# Patient Record
Sex: FEMALE | Race: BLACK OR AFRICAN AMERICAN | NOT HISPANIC OR LATINO | Employment: OTHER | URBAN - METROPOLITAN AREA
[De-identification: names, ages, dates, MRNs, and addresses within clinical notes are randomized per-mention and may not be internally consistent; named-entity substitution may affect disease eponyms.]

---

## 2018-01-08 ENCOUNTER — HOSPITAL ENCOUNTER (INPATIENT)
Facility: HOSPITAL | Age: 68
LOS: 3 days | Discharge: HOME | DRG: 304 | End: 2018-01-11
Attending: EMERGENCY MEDICINE | Admitting: INTERNAL MEDICINE
Payer: MEDICARE

## 2018-01-08 DIAGNOSIS — R74.8 ELEVATED LIPASE: ICD-10-CM

## 2018-01-08 DIAGNOSIS — R11.2 INTRACTABLE VOMITING WITH NAUSEA, UNSPECIFIED VOMITING TYPE: ICD-10-CM

## 2018-01-08 DIAGNOSIS — R79.89 ELEVATED TROPONIN: ICD-10-CM

## 2018-01-08 DIAGNOSIS — R07.9 CHEST PAIN: ICD-10-CM

## 2018-01-08 DIAGNOSIS — I21.4 NSTEMI (NON-ST ELEVATED MYOCARDIAL INFARCTION): Primary | ICD-10-CM

## 2018-01-08 LAB
ALBUMIN SERPL BCP-MCNC: 3.7 G/DL
ALP SERPL-CCNC: 179 U/L
ALT SERPL W/O P-5'-P-CCNC: 21 U/L
ANION GAP SERPL CALC-SCNC: 10 MMOL/L
AST SERPL-CCNC: 13 U/L
BACTERIA #/AREA URNS HPF: ABNORMAL /HPF
BASOPHILS # BLD AUTO: 0.02 K/UL
BASOPHILS NFR BLD: 0.1 %
BILIRUB SERPL-MCNC: 0.4 MG/DL
BILIRUB UR QL STRIP: NEGATIVE
BNP SERPL-MCNC: 224 PG/ML
BUN SERPL-MCNC: 9 MG/DL
CALCIUM SERPL-MCNC: 10 MG/DL
CHLORIDE SERPL-SCNC: 109 MMOL/L
CLARITY UR: CLEAR
CO2 SERPL-SCNC: 27 MMOL/L
COLOR UR: YELLOW
CREAT SERPL-MCNC: 1.1 MG/DL
DIFFERENTIAL METHOD: ABNORMAL
EOSINOPHIL # BLD AUTO: 0 K/UL
EOSINOPHIL NFR BLD: 0.3 %
ERYTHROCYTE [DISTWIDTH] IN BLOOD BY AUTOMATED COUNT: 15.2 %
EST. GFR  (AFRICAN AMERICAN): 60 ML/MIN/1.73 M^2
EST. GFR  (NON AFRICAN AMERICAN): 52 ML/MIN/1.73 M^2
GLUCOSE SERPL-MCNC: 146 MG/DL
GLUCOSE UR QL STRIP: NEGATIVE
HCT VFR BLD AUTO: 40.3 %
HGB BLD-MCNC: 12.9 G/DL
HGB UR QL STRIP: NEGATIVE
HYALINE CASTS #/AREA URNS LPF: 0 /LPF
KETONES UR QL STRIP: ABNORMAL
LEUKOCYTE ESTERASE UR QL STRIP: NEGATIVE
LIPASE SERPL-CCNC: 323 U/L
LYMPHOCYTES # BLD AUTO: 1.5 K/UL
LYMPHOCYTES NFR BLD: 10.9 %
MCH RBC QN AUTO: 26.4 PG
MCHC RBC AUTO-ENTMCNC: 32 G/DL
MCV RBC AUTO: 83 FL
MICROSCOPIC COMMENT: ABNORMAL
MONOCYTES # BLD AUTO: 0.8 K/UL
MONOCYTES NFR BLD: 5.6 %
NEUTROPHILS # BLD AUTO: 11.4 K/UL
NEUTROPHILS NFR BLD: 82.9 %
NITRITE UR QL STRIP: NEGATIVE
PH UR STRIP: 5 [PH] (ref 5–8)
PLATELET # BLD AUTO: 432 K/UL
PMV BLD AUTO: 10.4 FL
POTASSIUM SERPL-SCNC: 4.1 MMOL/L
PROT SERPL-MCNC: 8 G/DL
PROT UR QL STRIP: ABNORMAL
RBC # BLD AUTO: 4.88 M/UL
RBC #/AREA URNS HPF: 1 /HPF (ref 0–4)
SODIUM SERPL-SCNC: 146 MMOL/L
SP GR UR STRIP: 1.02 (ref 1–1.03)
SQUAMOUS #/AREA URNS HPF: 3 /HPF
TROPONIN I SERPL DL<=0.01 NG/ML-MCNC: 0.2 NG/ML
URN SPEC COLLECT METH UR: ABNORMAL
UROBILINOGEN UR STRIP-ACNC: NEGATIVE EU/DL
WBC # BLD AUTO: 13.71 K/UL
WBC #/AREA URNS HPF: 1 /HPF (ref 0–5)
YEAST URNS QL MICRO: ABNORMAL

## 2018-01-08 PROCEDURE — 99285 EMERGENCY DEPT VISIT HI MDM: CPT | Mod: 25

## 2018-01-08 PROCEDURE — 63600175 PHARM REV CODE 636 W HCPCS: Performed by: EMERGENCY MEDICINE

## 2018-01-08 PROCEDURE — 93005 ELECTROCARDIOGRAM TRACING: CPT

## 2018-01-08 PROCEDURE — 82962 GLUCOSE BLOOD TEST: CPT

## 2018-01-08 PROCEDURE — 83690 ASSAY OF LIPASE: CPT

## 2018-01-08 PROCEDURE — 96375 TX/PRO/DX INJ NEW DRUG ADDON: CPT

## 2018-01-08 PROCEDURE — 25500020 PHARM REV CODE 255: Performed by: EMERGENCY MEDICINE

## 2018-01-08 PROCEDURE — 25000003 PHARM REV CODE 250: Performed by: EMERGENCY MEDICINE

## 2018-01-08 PROCEDURE — 84484 ASSAY OF TROPONIN QUANT: CPT

## 2018-01-08 PROCEDURE — 96361 HYDRATE IV INFUSION ADD-ON: CPT

## 2018-01-08 PROCEDURE — 85025 COMPLETE CBC W/AUTO DIFF WBC: CPT

## 2018-01-08 PROCEDURE — 12000002 HC ACUTE/MED SURGE SEMI-PRIVATE ROOM

## 2018-01-08 PROCEDURE — 80053 COMPREHEN METABOLIC PANEL: CPT

## 2018-01-08 PROCEDURE — 93010 ELECTROCARDIOGRAM REPORT: CPT | Mod: ,,, | Performed by: INTERNAL MEDICINE

## 2018-01-08 PROCEDURE — 96365 THER/PROPH/DIAG IV INF INIT: CPT

## 2018-01-08 PROCEDURE — 83880 ASSAY OF NATRIURETIC PEPTIDE: CPT

## 2018-01-08 PROCEDURE — 81000 URINALYSIS NONAUTO W/SCOPE: CPT

## 2018-01-08 RX ORDER — METOPROLOL TARTRATE 25 MG/1
25 TABLET, FILM COATED ORAL DAILY
Status: DISCONTINUED | OUTPATIENT
Start: 2018-01-08 | End: 2018-01-10

## 2018-01-08 RX ORDER — CLOPIDOGREL 300 MG/1
300 TABLET, FILM COATED ORAL
Status: COMPLETED | OUTPATIENT
Start: 2018-01-08 | End: 2018-01-08

## 2018-01-08 RX ORDER — ASPIRIN 325 MG
325 TABLET ORAL
Status: COMPLETED | OUTPATIENT
Start: 2018-01-08 | End: 2018-01-08

## 2018-01-08 RX ORDER — ONDANSETRON 2 MG/ML
8 INJECTION INTRAMUSCULAR; INTRAVENOUS
Status: COMPLETED | OUTPATIENT
Start: 2018-01-08 | End: 2018-01-08

## 2018-01-08 RX ORDER — LISINOPRIL 5 MG/1
10 TABLET ORAL DAILY
Status: DISCONTINUED | OUTPATIENT
Start: 2018-01-08 | End: 2018-01-09

## 2018-01-08 RX ADMIN — ONDANSETRON 8 MG: 2 INJECTION INTRAMUSCULAR; INTRAVENOUS at 06:01

## 2018-01-08 RX ADMIN — SODIUM CHLORIDE 1000 ML: 0.9 INJECTION, SOLUTION INTRAVENOUS at 06:01

## 2018-01-08 RX ADMIN — IOHEXOL 15 ML: 300 INJECTION, SOLUTION INTRAVENOUS at 07:01

## 2018-01-08 RX ADMIN — LISINOPRIL 10 MG: 5 TABLET ORAL at 11:01

## 2018-01-08 RX ADMIN — PROMETHAZINE HYDROCHLORIDE 25 MG: 25 INJECTION INTRAMUSCULAR; INTRAVENOUS at 08:01

## 2018-01-08 RX ADMIN — METOPROLOL TARTRATE 25 MG: 25 TABLET ORAL at 11:01

## 2018-01-08 RX ADMIN — CLOPIDOGREL BISULFATE 300 MG: 300 TABLET, FILM COATED ORAL at 11:01

## 2018-01-08 RX ADMIN — ASPIRIN 325 MG ORAL TABLET 325 MG: 325 PILL ORAL at 08:01

## 2018-01-08 RX ADMIN — IOHEXOL 80 ML: 350 INJECTION, SOLUTION INTRAVENOUS at 07:01

## 2018-01-09 PROBLEM — I16.1 HYPERTENSIVE EMERGENCY: Status: ACTIVE | Noted: 2018-01-09

## 2018-01-09 PROBLEM — E86.0 DEHYDRATION WITH HYPERNATREMIA: Status: ACTIVE | Noted: 2018-01-09

## 2018-01-09 PROBLEM — F17.200 SMOKING: Status: ACTIVE | Noted: 2018-01-09

## 2018-01-09 PROBLEM — E87.0 DEHYDRATION WITH HYPERNATREMIA: Status: ACTIVE | Noted: 2018-01-09

## 2018-01-09 PROBLEM — E11.9 DM TYPE 2 (DIABETES MELLITUS, TYPE 2): Status: ACTIVE | Noted: 2018-01-09

## 2018-01-09 PROBLEM — K85.90 ACUTE PANCREATITIS: Status: ACTIVE | Noted: 2018-01-09

## 2018-01-09 LAB
ALBUMIN SERPL BCP-MCNC: 3.6 G/DL
ALP SERPL-CCNC: 163 U/L
ALT SERPL W/O P-5'-P-CCNC: 21 U/L
ANION GAP SERPL CALC-SCNC: 11 MMOL/L
AST SERPL-CCNC: 17 U/L
BASOPHILS # BLD AUTO: 0.02 K/UL
BASOPHILS NFR BLD: 0.1 %
BILIRUB SERPL-MCNC: 0.5 MG/DL
BUN SERPL-MCNC: 10 MG/DL
CALCIUM SERPL-MCNC: 9.6 MG/DL
CHLORIDE SERPL-SCNC: 108 MMOL/L
CO2 SERPL-SCNC: 28 MMOL/L
CREAT SERPL-MCNC: 1.1 MG/DL
DIASTOLIC DYSFUNCTION: NO
DIASTOLIC DYSFUNCTION: YES
DIFFERENTIAL METHOD: ABNORMAL
EOSINOPHIL # BLD AUTO: 0 K/UL
EOSINOPHIL NFR BLD: 0.1 %
ERYTHROCYTE [DISTWIDTH] IN BLOOD BY AUTOMATED COUNT: 15.3 %
EST. GFR  (AFRICAN AMERICAN): 60 ML/MIN/1.73 M^2
EST. GFR  (NON AFRICAN AMERICAN): 52 ML/MIN/1.73 M^2
ESTIMATED AVG GLUCOSE: 154 MG/DL
ESTIMATED PA SYSTOLIC PRESSURE: 35.88
GLOBAL PERICARDIAL EFFUSION: ABNORMAL
GLUCOSE SERPL-MCNC: 165 MG/DL
HBA1C MFR BLD HPLC: 7 %
HCT VFR BLD AUTO: 38.4 %
HGB BLD-MCNC: 12.6 G/DL
LIPASE SERPL-CCNC: 7 U/L
LYMPHOCYTES # BLD AUTO: 1.5 K/UL
LYMPHOCYTES NFR BLD: 10.2 %
MCH RBC QN AUTO: 26.5 PG
MCHC RBC AUTO-ENTMCNC: 32.8 G/DL
MCV RBC AUTO: 81 FL
MITRAL VALVE MOBILITY: NORMAL
MONOCYTES # BLD AUTO: 0.9 K/UL
MONOCYTES NFR BLD: 6.1 %
NEUTROPHILS # BLD AUTO: 12.5 K/UL
NEUTROPHILS NFR BLD: 83.2 %
PLATELET # BLD AUTO: 428 K/UL
PMV BLD AUTO: 10.2 FL
POCT GLUCOSE: 158 MG/DL (ref 70–110)
POCT GLUCOSE: 174 MG/DL (ref 70–110)
POTASSIUM SERPL-SCNC: 3.4 MMOL/L
PROT SERPL-MCNC: 7.5 G/DL
RBC # BLD AUTO: 4.75 M/UL
RETIRED EF AND QEF - SEE NOTES: 35 (ref 55–65)
SODIUM SERPL-SCNC: 147 MMOL/L
TRICUSPID VALVE REGURGITATION: ABNORMAL
TROPONIN I SERPL DL<=0.01 NG/ML-MCNC: 0.17 NG/ML
TROPONIN I SERPL DL<=0.01 NG/ML-MCNC: 0.19 NG/ML
WBC # BLD AUTO: 15 K/UL

## 2018-01-09 PROCEDURE — 78452 HT MUSCLE IMAGE SPECT MULT: CPT | Mod: 26,,, | Performed by: INTERNAL MEDICINE

## 2018-01-09 PROCEDURE — 93306 TTE W/DOPPLER COMPLETE: CPT

## 2018-01-09 PROCEDURE — 94761 N-INVAS EAR/PLS OXIMETRY MLT: CPT

## 2018-01-09 PROCEDURE — 93018 CV STRESS TEST I&R ONLY: CPT | Mod: ,,, | Performed by: INTERNAL MEDICINE

## 2018-01-09 PROCEDURE — 85025 COMPLETE CBC W/AUTO DIFF WBC: CPT

## 2018-01-09 PROCEDURE — C9113 INJ PANTOPRAZOLE SODIUM, VIA: HCPCS | Performed by: EMERGENCY MEDICINE

## 2018-01-09 PROCEDURE — 93306 TTE W/DOPPLER COMPLETE: CPT | Mod: 26,,, | Performed by: INTERNAL MEDICINE

## 2018-01-09 PROCEDURE — 84484 ASSAY OF TROPONIN QUANT: CPT

## 2018-01-09 PROCEDURE — 25000003 PHARM REV CODE 250: Performed by: INTERNAL MEDICINE

## 2018-01-09 PROCEDURE — 63600175 PHARM REV CODE 636 W HCPCS: Performed by: EMERGENCY MEDICINE

## 2018-01-09 PROCEDURE — 99223 1ST HOSP IP/OBS HIGH 75: CPT | Mod: 25,,, | Performed by: INTERNAL MEDICINE

## 2018-01-09 PROCEDURE — 63600175 PHARM REV CODE 636 W HCPCS: Performed by: HOSPITALIST

## 2018-01-09 PROCEDURE — 21400001 HC TELEMETRY ROOM

## 2018-01-09 PROCEDURE — 93016 CV STRESS TEST SUPVJ ONLY: CPT | Mod: ,,, | Performed by: INTERNAL MEDICINE

## 2018-01-09 PROCEDURE — 25000242 PHARM REV CODE 250 ALT 637 W/ HCPCS: Performed by: HOSPITALIST

## 2018-01-09 PROCEDURE — 25000003 PHARM REV CODE 250: Performed by: EMERGENCY MEDICINE

## 2018-01-09 PROCEDURE — 36415 COLL VENOUS BLD VENIPUNCTURE: CPT

## 2018-01-09 PROCEDURE — 83036 HEMOGLOBIN GLYCOSYLATED A1C: CPT

## 2018-01-09 PROCEDURE — 94640 AIRWAY INHALATION TREATMENT: CPT

## 2018-01-09 PROCEDURE — 63600175 PHARM REV CODE 636 W HCPCS

## 2018-01-09 PROCEDURE — 63600175 PHARM REV CODE 636 W HCPCS: Performed by: INTERNAL MEDICINE

## 2018-01-09 PROCEDURE — 83690 ASSAY OF LIPASE: CPT

## 2018-01-09 PROCEDURE — 25000003 PHARM REV CODE 250: Performed by: HOSPITALIST

## 2018-01-09 PROCEDURE — 80053 COMPREHEN METABOLIC PANEL: CPT

## 2018-01-09 PROCEDURE — 93017 CV STRESS TEST TRACING ONLY: CPT

## 2018-01-09 RX ORDER — METOPROLOL TARTRATE 1 MG/ML
5 INJECTION, SOLUTION INTRAVENOUS EVERY 5 MIN PRN
Status: DISCONTINUED | OUTPATIENT
Start: 2018-01-09 | End: 2018-01-11 | Stop reason: HOSPADM

## 2018-01-09 RX ORDER — SODIUM CHLORIDE, SODIUM LACTATE, POTASSIUM CHLORIDE, CALCIUM CHLORIDE 600; 310; 30; 20 MG/100ML; MG/100ML; MG/100ML; MG/100ML
INJECTION, SOLUTION INTRAVENOUS CONTINUOUS
Status: ACTIVE | OUTPATIENT
Start: 2018-01-09 | End: 2018-01-10

## 2018-01-09 RX ORDER — ONDANSETRON 2 MG/ML
4 INJECTION INTRAMUSCULAR; INTRAVENOUS ONCE
Status: COMPLETED | OUTPATIENT
Start: 2018-01-09 | End: 2018-01-09

## 2018-01-09 RX ORDER — LISINOPRIL 5 MG/1
10 TABLET ORAL DAILY
Status: DISCONTINUED | OUTPATIENT
Start: 2018-01-09 | End: 2018-01-09

## 2018-01-09 RX ORDER — ASPIRIN 325 MG
325 TABLET ORAL DAILY
Status: DISCONTINUED | OUTPATIENT
Start: 2018-01-09 | End: 2018-01-09

## 2018-01-09 RX ORDER — SODIUM CHLORIDE 9 MG/ML
INJECTION, SOLUTION INTRAVENOUS CONTINUOUS
Status: DISCONTINUED | OUTPATIENT
Start: 2018-01-09 | End: 2018-01-09

## 2018-01-09 RX ORDER — ACETAMINOPHEN 325 MG/1
650 TABLET ORAL EVERY 6 HOURS PRN
Status: DISCONTINUED | OUTPATIENT
Start: 2018-01-09 | End: 2018-01-11 | Stop reason: HOSPADM

## 2018-01-09 RX ORDER — ONDANSETRON 2 MG/ML
8 INJECTION INTRAMUSCULAR; INTRAVENOUS EVERY 8 HOURS PRN
Status: DISCONTINUED | OUTPATIENT
Start: 2018-01-09 | End: 2018-01-11 | Stop reason: HOSPADM

## 2018-01-09 RX ORDER — PROMETHAZINE HYDROCHLORIDE 25 MG/1
25 SUPPOSITORY RECTAL EVERY 6 HOURS PRN
Status: DISCONTINUED | OUTPATIENT
Start: 2018-01-09 | End: 2018-01-09

## 2018-01-09 RX ORDER — PROCHLORPERAZINE EDISYLATE 5 MG/ML
2.5 INJECTION INTRAMUSCULAR; INTRAVENOUS EVERY 8 HOURS PRN
Status: DISCONTINUED | OUTPATIENT
Start: 2018-01-09 | End: 2018-01-11 | Stop reason: HOSPADM

## 2018-01-09 RX ORDER — INSULIN ASPART 100 [IU]/ML
1-10 INJECTION, SOLUTION INTRAVENOUS; SUBCUTANEOUS EVERY 6 HOURS PRN
Status: DISCONTINUED | OUTPATIENT
Start: 2018-01-09 | End: 2018-01-11 | Stop reason: HOSPADM

## 2018-01-09 RX ORDER — PANTOPRAZOLE SODIUM 40 MG/10ML
40 INJECTION, POWDER, LYOPHILIZED, FOR SOLUTION INTRAVENOUS DAILY
Status: DISCONTINUED | OUTPATIENT
Start: 2018-01-09 | End: 2018-01-11 | Stop reason: HOSPADM

## 2018-01-09 RX ORDER — ONDANSETRON 2 MG/ML
INJECTION INTRAMUSCULAR; INTRAVENOUS
Status: DISPENSED
Start: 2018-01-09 | End: 2018-01-10

## 2018-01-09 RX ORDER — LISINOPRIL 20 MG/1
40 TABLET ORAL DAILY
Status: DISCONTINUED | OUTPATIENT
Start: 2018-01-10 | End: 2018-01-11 | Stop reason: HOSPADM

## 2018-01-09 RX ORDER — HYDRALAZINE HYDROCHLORIDE 20 MG/ML
10 INJECTION INTRAMUSCULAR; INTRAVENOUS EVERY 6 HOURS PRN
Status: DISCONTINUED | OUTPATIENT
Start: 2018-01-09 | End: 2018-01-11 | Stop reason: HOSPADM

## 2018-01-09 RX ORDER — GLUCAGON 1 MG
1 KIT INJECTION
Status: DISCONTINUED | OUTPATIENT
Start: 2018-01-09 | End: 2018-01-11 | Stop reason: HOSPADM

## 2018-01-09 RX ORDER — CLOPIDOGREL BISULFATE 75 MG/1
75 TABLET ORAL DAILY
Status: DISCONTINUED | OUTPATIENT
Start: 2018-01-09 | End: 2018-01-10

## 2018-01-09 RX ORDER — ASPIRIN 81 MG/1
81 TABLET ORAL DAILY
Status: DISCONTINUED | OUTPATIENT
Start: 2018-01-10 | End: 2018-01-11

## 2018-01-09 RX ORDER — FLUTICASONE PROPIONATE AND SALMETEROL 250; 50 UG/1; UG/1
1 POWDER RESPIRATORY (INHALATION) 2 TIMES DAILY
Status: DISCONTINUED | OUTPATIENT
Start: 2018-01-09 | End: 2018-01-09 | Stop reason: CLARIF

## 2018-01-09 RX ORDER — REGADENOSON 0.08 MG/ML
INJECTION, SOLUTION INTRAVENOUS
Status: DISPENSED
Start: 2018-01-09 | End: 2018-01-10

## 2018-01-09 RX ORDER — PROCHLORPERAZINE EDISYLATE 5 MG/ML
10 INJECTION INTRAMUSCULAR; INTRAVENOUS EVERY 6 HOURS PRN
Status: DISCONTINUED | OUTPATIENT
Start: 2018-01-09 | End: 2018-01-09

## 2018-01-09 RX ORDER — FLUTICASONE FUROATE AND VILANTEROL 100; 25 UG/1; UG/1
1 POWDER RESPIRATORY (INHALATION) DAILY
Status: DISCONTINUED | OUTPATIENT
Start: 2018-01-09 | End: 2018-01-11 | Stop reason: HOSPADM

## 2018-01-09 RX ORDER — ONDANSETRON 2 MG/ML
4 INJECTION INTRAMUSCULAR; INTRAVENOUS EVERY 12 HOURS PRN
Status: DISCONTINUED | OUTPATIENT
Start: 2018-01-09 | End: 2018-01-09

## 2018-01-09 RX ORDER — LISINOPRIL 20 MG/1
20 TABLET ORAL ONCE
Status: COMPLETED | OUTPATIENT
Start: 2018-01-09 | End: 2018-01-09

## 2018-01-09 RX ORDER — SERTRALINE HYDROCHLORIDE 50 MG/1
100 TABLET, FILM COATED ORAL NIGHTLY
Status: DISCONTINUED | OUTPATIENT
Start: 2018-01-09 | End: 2018-01-11 | Stop reason: HOSPADM

## 2018-01-09 RX ADMIN — SODIUM CHLORIDE, SODIUM LACTATE, POTASSIUM CHLORIDE, AND CALCIUM CHLORIDE: 600; 310; 30; 20 INJECTION, SOLUTION INTRAVENOUS at 06:01

## 2018-01-09 RX ADMIN — SODIUM CHLORIDE: 0.9 INJECTION, SOLUTION INTRAVENOUS at 02:01

## 2018-01-09 RX ADMIN — ONDANSETRON 4 MG: 2 INJECTION, SOLUTION INTRAMUSCULAR; INTRAVENOUS at 04:01

## 2018-01-09 RX ADMIN — ONDANSETRON 4 MG: 2 INJECTION, SOLUTION INTRAMUSCULAR; INTRAVENOUS at 12:01

## 2018-01-09 RX ADMIN — LISINOPRIL 10 MG: 5 TABLET ORAL at 09:01

## 2018-01-09 RX ADMIN — HYDRALAZINE HYDROCHLORIDE 10 MG: 20 INJECTION INTRAMUSCULAR; INTRAVENOUS at 05:01

## 2018-01-09 RX ADMIN — PROCHLORPERAZINE EDISYLATE 10 MG: 5 INJECTION INTRAMUSCULAR; INTRAVENOUS at 06:01

## 2018-01-09 RX ADMIN — ASPIRIN 325 MG ORAL TABLET 325 MG: 325 PILL ORAL at 09:01

## 2018-01-09 RX ADMIN — HYDRALAZINE HYDROCHLORIDE 10 MG: 20 INJECTION INTRAMUSCULAR; INTRAVENOUS at 04:01

## 2018-01-09 RX ADMIN — METOPROLOL TARTRATE 25 MG: 25 TABLET ORAL at 09:01

## 2018-01-09 RX ADMIN — PANTOPRAZOLE SODIUM 40 MG: 40 INJECTION, POWDER, FOR SOLUTION INTRAVENOUS at 09:01

## 2018-01-09 RX ADMIN — FLUTICASONE FUROATE AND VILANTEROL TRIFENATATE 1 PUFF: 100; 25 POWDER RESPIRATORY (INHALATION) at 08:01

## 2018-01-09 RX ADMIN — CLOPIDOGREL BISULFATE 75 MG: 75 TABLET ORAL at 09:01

## 2018-01-09 RX ADMIN — SERTRALINE HYDROCHLORIDE 100 MG: 50 TABLET ORAL at 08:01

## 2018-01-09 RX ADMIN — PROCHLORPERAZINE EDISYLATE 2.5 MG: 5 INJECTION INTRAMUSCULAR; INTRAVENOUS at 06:01

## 2018-01-09 RX ADMIN — ACETAMINOPHEN 650 MG: 325 TABLET ORAL at 02:01

## 2018-01-09 RX ADMIN — LISINOPRIL 20 MG: 20 TABLET ORAL at 08:01

## 2018-01-09 NOTE — PLAN OF CARE
Problem: Diabetes, Type 2 (Adult)  Intervention: Support/Optimize Psychosocial Response to Condition   01/09/18 1404   Coping/Psychosocial Interventions   Supportive Measures active listening utilized;positive reinforcement provided   Environmental Support environmental consistency promoted;calm environment promoted     Intervention: Optimize Glycemic Control   01/09/18 1404   Nutrition Interventions   Glycemic Management blood glucose monitoring       Goal: Signs and Symptoms of Listed Potential Problems Will be Absent, Minimized or Managed (Diabetes, Type 2)  Signs and symptoms of listed potential problems will be absent, minimized or managed by discharge/transition of care (reference Diabetes, Type 2 (Adult) CPG).    01/09/18 1404   Diabetes, Type 2   Problems Assessed (Type 2 Diabetes) hyperglycemia;hypoglycemia;situational response   Problems Present (Type 2 Diabetes) hyperglycemia;hypoglycemia;situational response

## 2018-01-09 NOTE — SUBJECTIVE & OBJECTIVE
"Past Medical History:   Diagnosis Date    Anxiety     Arthritis     Asthma     Bronchiectasis with acute exacerbation     Bronchitis     Chronic pain     right knee    Essential hypertension, benign 11/21/2012    Hypotension, iatrogenic     Insomnia     Knee pain, right     chronic    Mitral valve prolapse     Obstructive chronic bronchitis without exacerbation 11/21/2012    Type II or unspecified type diabetes mellitus without mention of complication, uncontrolled     Type II or unspecified type diabetes mellitus without mention of complication, uncontrolled        Past Surgical History:   Procedure Laterality Date    bowel reconstruction      BREAST SURGERY      CHOLECYSTECTOMY      HYSTERECTOMY      KNEE SURGERY      R, surgeries x 7    Right Leg reconstruction surgery      Rotator Cuff Surgery      TONSILLECTOMY      WRIST SURGERY         Review of patient's allergies indicates:  No Known Allergies    No current facility-administered medications on file prior to encounter.      Current Outpatient Prescriptions on File Prior to Encounter   Medication Sig    ADVAIR DISKUS 250-50 mcg/dose diskus inhaler     bumetanide (BUMEX) 2 MG tablet     diclofenac sodium (VOLTAREN) 1 % Gel Apply 2 g topically 4 (four) times daily.    gabapentin (NEURONTIN) 600 MG tablet     LEVEMIR FLEXPEN 100 unit/mL (3 mL) InPn     lisinopril 10 MG tablet     NOVOLOG FLEXPEN 100 unit/mL InPn     potassium chloride (MICRO-K) 10 MEQ CpSR Take 10 mEq by mouth once daily.      quetiapine (SEROQUEL) 100 MG Tab Take by mouth every evening.    sertraline (ZOLOFT) 100 MG tablet     SURE COMFORT PEN NEEDLE 31 x 3/16 " Ndle     trazodone (DESYREL) 100 MG tablet Take 100 mg by mouth every evening.    [DISCONTINUED] desmopressin 10 mcg/spray (0.1 mL) Spry     [DISCONTINUED] fluconazole (DIFLUCAN) 150 MG Tab      Family History     Problem Relation (Age of Onset)    Heart disease Sister, Brother    Hypertension Mother "        Social History Main Topics    Smoking status: Current Every Day Smoker     Packs/day: 0.25     Years: 45.00     Types: Cigarettes    Smokeless tobacco: Never Used    Alcohol use No    Drug use: Yes     Types: Marijuana    Sexual activity: Not Currently     Partners: Male     Birth control/ protection: None     Review of Systems   Constitution: Negative for chills, diaphoresis, fever, weakness and malaise/fatigue.   HENT: Negative for nosebleeds.    Eyes: Negative for blurred vision and double vision.   Cardiovascular: Negative for chest pain, claudication, cyanosis, dyspnea on exertion, leg swelling, orthopnea, palpitations, paroxysmal nocturnal dyspnea and syncope.   Respiratory: Negative for cough, shortness of breath and wheezing.    Skin: Negative for dry skin and poor wound healing.   Musculoskeletal: Negative for back pain, joint swelling and myalgias.   Gastrointestinal: Positive for abdominal pain, nausea and vomiting.   Genitourinary: Negative for hematuria.   Neurological: Negative for dizziness, headaches, numbness and seizures.   Psychiatric/Behavioral: Negative for altered mental status and depression.     Objective:     Vital Signs (Most Recent):  Temp: 98.6 °F (37 °C) (01/09/18 0814)  Pulse: 102 (01/09/18 0849)  Resp: 18 (01/09/18 0849)  BP: (!) 172/79 (01/09/18 0814)  SpO2: (!) 92 % (01/09/18 0849) Vital Signs (24h Range):  Temp:  [98.2 °F (36.8 °C)-98.8 °F (37.1 °C)] 98.6 °F (37 °C)  Pulse:  [] 102  Resp:  [18-20] 18  SpO2:  [91 %-98 %] 92 %  BP: (158-210)/() 172/79     Weight: 98 kg (216 lb 0.8 oz)  Body mass index is 42.19 kg/m².    SpO2: (!) 92 %  O2 Device (Oxygen Therapy): room air      Intake/Output Summary (Last 24 hours) at 01/09/18 0902  Last data filed at 01/09/18 0646   Gross per 24 hour   Intake              340 ml   Output                0 ml   Net              340 ml       Lines/Drains/Airways     Peripheral Intravenous Line                 Peripheral IV -  Single Lumen 01/08/18 1852 Right Hand less than 1 day                Physical Exam   Constitutional: She is oriented to person, place, and time. She appears well-developed and well-nourished. No distress.   HENT:   Head: Normocephalic and atraumatic.   Mouth/Throat: No oropharyngeal exudate.   Eyes: Conjunctivae and EOM are normal. Pupils are equal, round, and reactive to light. No scleral icterus.   Neck: Normal range of motion. Neck supple. No JVD present. No tracheal deviation present.   Cardiovascular: Normal rate, regular rhythm, S1 normal and S2 normal.  Exam reveals no gallop and no friction rub.    No murmur heard.  Pulmonary/Chest: Effort normal and breath sounds normal. No respiratory distress. She has no wheezes.   Abdominal: Soft. Bowel sounds are normal. There is no tenderness.   obese   Musculoskeletal: Normal range of motion. She exhibits no edema.   Neurological: She is alert and oriented to person, place, and time. No cranial nerve deficit.   Skin: Skin is warm and dry. She is not diaphoretic.   Psychiatric: She has a normal mood and affect. Her behavior is normal. Judgment normal.       Current Medications:   aspirin  325 mg Oral Daily    clopidogrel  75 mg Oral Daily    fluticasone-vilanterol  1 puff Inhalation Daily    lisinopril  10 mg Oral Daily    metoprolol tartrate  25 mg Oral Daily    pantoprazole  40 mg Intravenous Daily    sertraline  100 mg Oral QHS      lactated ringers 150 mL/hr at 01/09/18 0646     acetaminophen, acetaminophen, dextrose 50%, glucagon (human recombinant), hydrALAZINE, influenza, insulin aspart, metoprolol, ondansetron, pneumoc 13-amos conj-dip cr(PF), prochlorperazine, promethazine    Laboratory:  CBC:    Recent Labs  Lab 10/25/16  0314 01/08/18  1628 01/09/18  0659   WHITE BLOOD CELL COUNT 14.56 H 13.71 H 15.00 H   HEMOGLOBIN 12.2 12.9 12.6   HEMATOCRIT 37.8 40.3 38.4   PLATELETS 431 H 432 H 428 H       CHEMISTRIES:    Recent Labs  Lab 10/25/16  0313  01/08/18  1628 01/09/18  0659   GLUCOSE 164 H 146 H 165 H   SODIUM 140 146 H 147 H   POTASSIUM 5.1 4.1 3.4 L   BUN BLD 26 H 9 10   CREATININE 1.2 1.1 1.1   EGFR IF  54 A 60 60   EGFR IF NON- 47 A 52 A 52 A   CALCIUM 9.8 10.0 9.6       CARDIAC BIOMARKERS:    Recent Labs  Lab 10/25/16  0314 01/08/18  1854 01/09/18  0037   TROPONIN I <0.006 0.198 H 0.191 H       COAGS:    Recent Labs  Lab 10/25/16  0314   INR 1.0       LIPIDS/LFTS:    Recent Labs  Lab 10/25/16  0314 01/08/18  1628 01/09/18  0659   AST 40 13 17   ALT 59 H 21 21     Lab Results   Component Value Date    LIPASE 7 01/09/2018     BNP    Recent Labs  Lab 01/08/18  1854   *           Diagnostic Results:  ECG (personally reviewed tracings):   1/8/18 2036 SR 96, PVC, PRWP, inflat st abnl ?isch    CT abd 1/8/18  1.  Intrahepatic and extrahepatic biliary ductal dilatation of unclear etiology.  Gallbladder is not seen and likely has been removed.  Further evaluation may be warranted with MRCP or ERCP.  2.  No CT evidence to suggest acute pancreatitis as clinically questioned.  3.  Colonic diverticulosis with no evidence of acute diverticulitis.  4.  Additional findings as detailed above.    Echo: pending

## 2018-01-09 NOTE — ASSESSMENT & PLAN NOTE
· Body mass index is 42.19 kg/m².  · Order a cardiac diet  · Counseling given  · Nutrition consult as an outpatient

## 2018-01-09 NOTE — ASSESSMENT & PLAN NOTE
· Chronic tobacco use  · Tobacco cessation counseling  · Nicotine patch contraindicated with possible ACS; consider Wellbutrin or Chantix through PCP as an outpatient (will require closer monitoring)

## 2018-01-09 NOTE — ASSESSMENT & PLAN NOTE
· Presents with a systolic blood pressure of approximately 220 mmHg and elevated troponin.    · In order to decrease the risk of acute stroke, we will initiate blood pressure medications with a goal of a decrease of 30% in the next 24 hours.  · Thereafter, the goal while inpatient is a systolic blood pressure less than 160mmHg  · BP not in acceptable range at this time  · Initiate oral regimen as well as providing PRN IV medications  · May need to escalate to Cardene drip for tighter blood pressure control  · Continue current regimen

## 2018-01-09 NOTE — PROGRESS NOTES
Note that patient's BP is elevated (182/85); Patient denies Junior, Cp; Denies nausea at this time; Given PRN hydralazine; Will continue to f/u;

## 2018-01-09 NOTE — PLAN OF CARE
"TN completed discharge needs assessment. TN provided and reviewed with patient "Blue My Health Packet" , "Help At Home" and "Discharge Planning Begins on Admission" handouts. TN discussed with patient the things the patient is responsible for to manage patient's  healthcare at home. Patient verbalized understanding & teachback implemented. Patient prefers morning doctor appointments.     01/09/18 1534   Discharge Assessment   Assessment Type Discharge Planning Assessment   Assessment information obtained from? Patient   Prior to hospitilization cognitive status: Alert/Oriented;No Deficits   Prior to hospitalization functional status: Independent;Assistive Equipment   Current cognitive status: Alert/Oriented;No Deficits   Current Functional Status: Independent;Assistive Equipment   Lives With alone   Able to Return to Prior Arrangements no   Who are your caregiver(s) and their phone number(s)? (Sita Keyes (Daughter)      )   Patient's perception of discharge disposition admitted as an inpatient   Readmission Within The Last 30 Days no previous admission in last 30 days   Patient currently being followed by outpatient case management? No   Patient currently receives any other outside agency services? No   Equipment Currently Used at Home cane, straight;rollator;glucometer   Do you have any problems affording any of your prescribed medications? No   Is the patient taking medications as prescribed? yes   Does the patient have transportation home? Yes   Transportation Available family or friend will provide   Discharge Plan A Home   Discharge Plan B Home   Patient/Family In Agreement With Plan yes   Does the patient have transportation to healthcare appointments? Yes     Electron Database [facility] - [place], [place] - [address] [facility]  [address] [facility] [address]  [facility] [address]  Phone: [phone] Fax: [phone]    [facility] 26 hrs /week 7 days a week         "

## 2018-01-09 NOTE — ASSESSMENT & PLAN NOTE
Elevated troponin level.  Likely cardiac strain secondary to uncontrolled hypertension as noted above.

## 2018-01-09 NOTE — CONSULTS
[facility]  Cardiology  Consult Note    Patient Name: Raissa Land  MRN: [id number]  Admission Date: 1/8/2018  Hospital Length of Stay: 1 days  Code Status: Full Code   Attending Provider: Sydni Deras MD   Consulting Provider: Daniel Maher MD  Primary Care Physician: Guerrero Hensley Jr, MD  Principal Problem:Hypertensive emergency    Patient information was obtained from patient and ER records.     Inpatient consult to Cardiology  Consult performed by: DANIEL MAHER  Consult ordered by: DANIEL DE PAZ  Reason for consult: elev trop        Subjective:     Chief Complaint:  nausea     HPI:   67 y.o. female that (in part)  has a past medical history of Anxiety; Arthritis; Asthma; Bronchiectasis with acute exacerbation; Bronchitis; Chronic pain; Essential hypertension, benign; Hypotension, iatrogenic; Insomnia; Knee pain, right; Mitral valve prolapse; Obstructive chronic bronchitis without exacerbation; Type II or unspecified type diabetes mellitus without mention of complication, uncontrolled; and Type II or unspecified type diabetes mellitus without mention of complication, uncontrolled. Presents to [facility] Emergency Department  presents with several hour duration of epigastric pain. Patient reports no history of previous pancreatitis that has required hospitalization.  Patient denies having any alcoholic drinks. Patient is experiencing persistent, deep, aching abdominal pain and points to the epigastric area when asked where the most severe pain is located. Patient admits to having associated symptoms including nausea with vomiting, but denies fever and chills, and has not been able to eat or drink since onset of abdominal pain.  Pain is exacerbated by palpation of the epigastrium as well as oral intake.  Relieved with pain medication given in the ER. Complains that the pain is severe, progressive, and worsening.    She has had previous pancreatic  surgery.  Reports associated symptoms of constipation no bowel movements in 3 days.  Also reportedly febrile 100.6°F.  Initially she was hypertensive with a blood pressure of 220 and was minimally responsive to initial dose of labetalol.  She was also found to have leukocytosis as well as anemia with an anion gap of 16.  Her influenza screen was negative.  CT abdomen pelvis was also obtained but did not show any abnormalities.  She has had low urine output and was given IV fluids in the ED and we are pending urinalysis.  General surgery has been consulted.    In the emergency department routine labs including lipase were obtained along with abdominal imaging.  Findings were consistent with acute pancreatitis.  She also had evidence of new T-wave inversions in anterolateral leads as well as elevated troponin level.  There is concern she was having hypertensive emergency due to a systolic blood pressure of ~220mmHg.     Lipase has normalized and pt feeling better.  She denies any CP/SOB, but states she hasn't been able to eat for 3 days due to nausea and retching.   on arrival with trop 0.19 x2 (flat pattern).    Past Medical History:   Diagnosis Date    Anxiety     Arthritis     Asthma     Bronchiectasis with acute exacerbation     Bronchitis     Chronic pain     right knee    Essential hypertension, benign 11/21/2012    Hypotension, iatrogenic     Insomnia     Knee pain, right     chronic    Mitral valve prolapse     Obstructive chronic bronchitis without exacerbation 11/21/2012    Type II or unspecified type diabetes mellitus without mention of complication, uncontrolled     Type II or unspecified type diabetes mellitus without mention of complication, uncontrolled        Past Surgical History:   Procedure Laterality Date    bowel reconstruction      BREAST SURGERY      CHOLECYSTECTOMY      HYSTERECTOMY      KNEE SURGERY      R, surgeries x 7    Right Leg reconstruction surgery       "Rotator Cuff Surgery      TONSILLECTOMY      WRIST SURGERY         Review of patient's allergies indicates:  No Known Allergies    No current facility-administered medications on file prior to encounter.      Current Outpatient Prescriptions on File Prior to Encounter   Medication Sig    ADVAIR DISKUS 250-50 mcg/dose diskus inhaler     bumetanide (BUMEX) 2 MG tablet     diclofenac sodium (VOLTAREN) 1 % Gel Apply 2 g topically 4 (four) times daily.    gabapentin (NEURONTIN) 600 MG tablet     LEVEMIR FLEXPEN 100 unit/mL (3 mL) InPn     lisinopril 10 MG tablet     NOVOLOG FLEXPEN 100 unit/mL InPn     potassium chloride (MICRO-K) 10 MEQ CpSR Take 10 mEq by mouth once daily.      quetiapine (SEROQUEL) 100 MG Tab Take by mouth every evening.    sertraline (ZOLOFT) 100 MG tablet     SURE COMFORT PEN NEEDLE 31 x 3/16 " Ndle     trazodone (DESYREL) 100 MG tablet Take 100 mg by mouth every evening.    [DISCONTINUED] desmopressin 10 mcg/spray (0.1 mL) Spry     [DISCONTINUED] fluconazole (DIFLUCAN) 150 MG Tab      Family History     Problem Relation (Age of Onset)    Heart disease Sister, Brother    Hypertension Mother        Social History Main Topics    Smoking status: Current Every Day Smoker     Packs/day: 0.25     Years: 45.00     Types: Cigarettes    Smokeless tobacco: Never Used    Alcohol use No    Drug use: Yes     Types: Marijuana    Sexual activity: Not Currently     Partners: Male     Birth control/ protection: None     Review of Systems   Constitution: Negative for chills, diaphoresis, fever, weakness and malaise/fatigue.   HENT: Negative for nosebleeds.    Eyes: Negative for blurred vision and double vision.   Cardiovascular: Negative for chest pain, claudication, cyanosis, dyspnea on exertion, leg swelling, orthopnea, palpitations, paroxysmal nocturnal dyspnea and syncope.   Respiratory: Negative for cough, shortness of breath and wheezing.    Skin: Negative for dry skin and poor wound " healing.   Musculoskeletal: Negative for back pain, joint swelling and myalgias.   Gastrointestinal: Positive for abdominal pain, nausea and vomiting.   Genitourinary: Negative for hematuria.   Neurological: Negative for dizziness, headaches, numbness and seizures.   Psychiatric/Behavioral: Negative for altered mental status and depression.     Objective:     Vital Signs (Most Recent):  Temp: 98.6 °F (37 °C) (01/09/18 0814)  Pulse: 102 (01/09/18 0849)  Resp: 18 (01/09/18 0849)  BP: (!) 172/79 (01/09/18 0814)  SpO2: (!) 92 % (01/09/18 0849) Vital Signs (24h Range):  Temp:  [98.2 °F (36.8 °C)-98.8 °F (37.1 °C)] 98.6 °F (37 °C)  Pulse:  [] 102  Resp:  [18-20] 18  SpO2:  [91 %-98 %] 92 %  BP: (158-210)/() 172/79     Weight: 98 kg (216 lb 0.8 oz)  Body mass index is 42.19 kg/m².    SpO2: (!) 92 %  O2 Device (Oxygen Therapy): room air      Intake/Output Summary (Last 24 hours) at 01/09/18 0902  Last data filed at 01/09/18 0646   Gross per 24 hour   Intake              340 ml   Output                0 ml   Net              340 ml       Lines/Drains/Airways     Peripheral Intravenous Line                 Peripheral IV - Single Lumen 01/08/18 1852 Right Hand less than 1 day                Physical Exam   Constitutional: She is oriented to person, place, and time. She appears well-developed and well-nourished. No distress.   HENT:   Head: Normocephalic and atraumatic.   Mouth/Throat: No oropharyngeal exudate.   Eyes: Conjunctivae and EOM are normal. Pupils are equal, round, and reactive to light. No scleral icterus.   Neck: Normal range of motion. Neck supple. No JVD present. No tracheal deviation present.   Cardiovascular: Normal rate, regular rhythm, S1 normal and S2 normal.  Exam reveals no gallop and no friction rub.    No murmur heard.  Pulmonary/Chest: Effort normal and breath sounds normal. No respiratory distress. She has no wheezes.   Abdominal: Soft. Bowel sounds are normal. There is no tenderness.    obese   Musculoskeletal: Normal range of motion. She exhibits no edema.   Neurological: She is alert and oriented to person, place, and time. No cranial nerve deficit.   Skin: Skin is warm and dry. She is not diaphoretic.   Psychiatric: She has a normal mood and affect. Her behavior is normal. Judgment normal.       Current Medications:   aspirin  325 mg Oral Daily    clopidogrel  75 mg Oral Daily    fluticasone-vilanterol  1 puff Inhalation Daily    lisinopril  10 mg Oral Daily    metoprolol tartrate  25 mg Oral Daily    pantoprazole  40 mg Intravenous Daily    sertraline  100 mg Oral QHS      lactated ringers 150 mL/hr at 01/09/18 0646     acetaminophen, acetaminophen, dextrose 50%, glucagon (human recombinant), hydrALAZINE, influenza, insulin aspart, metoprolol, ondansetron, pneumoc 13-amos conj-dip cr(PF), prochlorperazine, promethazine    Laboratory:  CBC:    Recent Labs  Lab 10/25/16  0314 01/08/18  1628 01/09/18  0659   WHITE BLOOD CELL COUNT 14.56 H 13.71 H 15.00 H   HEMOGLOBIN 12.2 12.9 12.6   HEMATOCRIT 37.8 40.3 38.4   PLATELETS 431 H 432 H 428 H       CHEMISTRIES:    Recent Labs  Lab 10/25/16  0314 01/08/18  1628 01/09/18  0659   GLUCOSE 164 H 146 H 165 H   SODIUM 140 146 H 147 H   POTASSIUM 5.1 4.1 3.4 L   BUN BLD 26 H 9 10   CREATININE 1.2 1.1 1.1   EGFR IF  54 A 60 60   EGFR IF NON- 47 A 52 A 52 A   CALCIUM 9.8 10.0 9.6       CARDIAC BIOMARKERS:    Recent Labs  Lab 10/25/16  0314 01/08/18  1854 01/09/18  0037   TROPONIN I <0.006 0.198 H 0.191 H       COAGS:    Recent Labs  Lab 10/25/16  0314   INR 1.0       LIPIDS/LFTS:    Recent Labs  Lab 10/25/16  0314 01/08/18  1628 01/09/18  0659   AST 40 13 17   ALT 59 H 21 21     Lab Results   Component Value Date    LIPASE 7 01/09/2018     BNP    Recent Labs  Lab 01/08/18  1854   *           Diagnostic Results:  ECG (personally reviewed tracings):   1/8/18 2036 SR 96, PVC, PRWP, inflat st abnl ?isch    CT abd  1/8/18  1.  Intrahepatic and extrahepatic biliary ductal dilatation of unclear etiology.  Gallbladder is not seen and likely has been removed.  Further evaluation may be warranted with MRCP or ERCP.  2.  No CT evidence to suggest acute pancreatitis as clinically questioned.  3.  Colonic diverticulosis with no evidence of acute diverticulitis.  4.  Additional findings as detailed above.    Echo: pending      Assessment and Plan:     * Hypertensive emergency    ?r/t GI process  Note mad eof elev trop/flat pattern without angina/dyspnea, likely demand related  Cont med rx HTN  Check echo and MPI today  Further recs pending above testing        NSTEMI (non-ST elevated myocardial infarction)    As above, likely demand not ACS  Echo and MPI today        DM type 2 (diabetes mellitus, type 2)    Per IM        Acute pancreatitis    Lipase normalized  GI eval pending            VTE Risk Mitigation         Ordered     Medium Risk of VTE  Once      01/09/18 0132     Place sequential compression device  Until discontinued      01/09/18 0132     Place GENARO hose  Until discontinued      01/09/18 0132          Thank you for your consult. I will follow-up with patient. Please contact us if you have any additional questions.    Daniel Arevalo MD  Cardiology   [facility]    Addendum 5pm:    Edith MPI  Nuclear Quantitative Functional Analysis:   LVEF: 42 %  LVED Volume: 122 ml  LVES Volume: 71 ml  Impression: ABNORMAL MYOCARDIAL PERFUSION  1. There is a small size fixed defect of mild intensity in the apical wall of the left ventricle, consistent with myocardial injury. There is trivial mode-injury ischemia.   2. There is abnormal wall motion at rest showing mild global hypokinesis of the left ventricle.   3. There is resting LV dysfunction with a reduced ejection fraction of 42 %.   4. The ventricular volumes are normal at rest and stress.   5. The extracardiac distribution of radioactivity is normal.      Echo    1 - Moderately depressed left ventricular systolic function (EF 35-40%).  Moderate global hypokinesis..     2 - Impaired LV relaxation, normal LAP (grade 1 diastolic dysfunction).     3 - Eccentric hypertrophy.     4 - Mild left ventricular enlargement.     5 - Trivial tricuspid regurgitation.     6 - The estimated PA systolic pressure is 36 mmHg.     No further CV testing planned.  Pt is at acceptable risk for GI procedures as deemed necessary.

## 2018-01-09 NOTE — ASSESSMENT & PLAN NOTE
· Evidenced by hx, physical exam, imaging, and laboratory studies  · Differential of common etiology includes: alcohol, gallstones, metabolic disorders (hereditary pancreatitis, hypercalcemia, elevated triglycerides, malnutrition), abdominal trauma, penetrating ulcers, carcinoma of the head of pancreas, drugs (diuretics, gliptins, tetracycline, sulfonamides, estrogens, azathioprine and mercaptopurine, pentamidine, salicylates, steroids),  infections  (mumps, viral hepatitis, coxsackie B virus, cytomegalovirus, Mycoplasma pneumoniae, Ascaris), structural abnormalities (choledochocele, pancreas divisum), or autoimmune pancreatitis    · Lipase = 323    [place]'s Criteria @ TIME OF ADMISSION  · Age >55 = yes  · WBC >16K = no  · BG >200 = no  · AST > 250 = no  · LDH > 350 = not ordered at the time of admission     Conclusion:  Patient has less than three criteria; does not meet ICU admission criteria.    · NPO / Bowel rest  · IV fluids  · Monitor serial lipase levels  · +/- GI consult as clinical course dictates

## 2018-01-09 NOTE — HPI
67 y.o. female that (in part)  has a past medical history of Anxiety; Arthritis; Asthma; Bronchiectasis with acute exacerbation; Bronchitis; Chronic pain; Essential hypertension, benign; Hypotension, iatrogenic; Insomnia; Knee pain, right; Mitral valve prolapse; Obstructive chronic bronchitis without exacerbation; Type II or unspecified type diabetes mellitus without mention of complication, uncontrolled; and Type II or unspecified type diabetes mellitus without mention of complication, uncontrolled. Presents to [facility]  presents with several hour duration of epigastric pain. Patient reports no history of previous pancreatitis that has required hospitalization.  Patient denies having any alcoholic drinks. Patient is experiencing persistent, deep, aching abdominal pain and points to the epigastric area when asked where the most severe pain is located. Patient admits to having associated symptoms including nausea with vomiting, but denies fever and chills, and has not been able to eat or drink since onset of abdominal pain.  Pain is exacerbated by palpation of the epigastrium as well as oral intake.  Relieved with pain medication given in the ER. Complains that the pain is severe, progressive, and worsening.    She has had previous pancreatic surgery.  Reports associated symptoms of constipation no bowel movements in 3 days.  Also reportedly febrile 100.6°F.  Initially she was hypertensive with a blood pressure of 220 and was minimally responsive to initial dose of labetalol.  She was also found to have leukocytosis as well as anemia with an anion gap of 16.  Her influenza screen was negative.  CT abdomen pelvis was also obtained but did not show any abnormalities.  She has had low urine output and was given IV fluids in the ED and we are pending urinalysis.  General surgery has been consulted.    In the emergency department routine labs including lipase were obtained  along with abdominal imaging.  Findings were consistent with acute pancreatitis.  She also had evidence of new T-wave inversions in anterolateral leads as well as elevated troponin level.  There is concern she was having hypertensive emergency due to a systolic blood pressure of ~220mmHg.     Lipase has normalized and pt feeling better.  She denies any CP/SOB, but states she hasn't been able to eat for 3 days due to nausea and retching.   on arrival with trop 0.19 x2 (flat pattern).

## 2018-01-09 NOTE — PROGRESS NOTES
"Note that patient awake, alert and fully oriented;  Denies nausea at this time; Patient is up to the BR with assistance;   Patient is requesting a private room as ,"i couldn't sleep last night."   Charge RN informed of room request.;   Will continue to monitor;    "

## 2018-01-09 NOTE — CONSULTS
"Chief Complaint:  "I was throwing up."    HPI:  The patient is a 67 year old woman with a history of HTN, mitral valve prolapse, DM, arthritis, and asthma presenting with hypertensive emergency, a NSTEMI, abdominal pain, nausea, and emesis.  She was having epigastric pain, nausea, and emesis for the last 6 days.  Her pain resolved, but her nausea and emesis continued to where she was unable to keep down her lisinopril.  She presented with very elevated blood pressures and her pancreatic enzymes were transiently elevated.  She is currently eating, but still has some nausea. She has emesis when she drank oral contrast for a CT scan. The patient has not had weight loss, diarrhea, or constipation.  She does not take NSAIDs.  The patient believes she had polyps years ago.    Past Medical History:   Diagnosis Date    Anxiety     Arthritis     Asthma     Bronchiectasis with acute exacerbation     Bronchitis     Chronic pain     right knee    Essential hypertension, benign 11/21/2012    Hypotension, iatrogenic     Insomnia     Knee pain, right     chronic    Mitral valve prolapse     Obstructive chronic bronchitis without exacerbation 11/21/2012    Type II or unspecified type diabetes mellitus without mention of complication, uncontrolled     Type II or unspecified type diabetes mellitus without mention of complication, uncontrolled      Past Surgical History:   Procedure Laterality Date    bowel reconstruction      BREAST SURGERY      CHOLECYSTECTOMY      HYSTERECTOMY      KNEE SURGERY      R, surgeries x 7    Right Leg reconstruction surgery      Rotator Cuff Surgery      TONSILLECTOMY      WRIST SURGERY       Family History   Problem Relation Age of Onset    Heart disease Sister     Heart disease Brother     Hypertension Mother      Social History     Social History    Marital status:      Spouse name: N/A    Number of children: N/A    Years of education: N/A     Occupational History "    Not on file.     Social History Main Topics    Smoking status: Current Every Day Smoker     Packs/day: 0.25     Years: 45.00     Types: Cigarettes    Smokeless tobacco: Never Used    Alcohol use No    Drug use: Yes     Types: Marijuana    Sexual activity: Not Currently     Partners: Male     Birth control/ protection: None     Other Topics Concern    Not on file     Social History Narrative    . Adult children. On disability.       aspirin  325 mg Oral Daily    clopidogrel  75 mg Oral Daily    fluticasone-vilanterol  1 puff Inhalation Daily    lisinopril  10 mg Oral Daily    metoprolol tartrate  25 mg Oral Daily    ondansetron        pantoprazole  40 mg Intravenous Daily    regadenoson        sertraline  100 mg Oral QHS     Review of patient's allergies indicates:  No Known Allergies    ROS:  No chest pain or dyspnea.  No dysuria.  No heartburn or dysphagia.  Otherwise as stated above.  Ten other systems negative.    Vitals:    01/09/18 0618 01/09/18 0814 01/09/18 0849 01/09/18 1523   BP: (!) 168/94 (!) 172/79  (!) 182/85   BP Location: Left arm Left arm  Left arm   Patient Position: Sitting Lying  Sitting   Pulse: 100 99 102 89   Resp:  18 18 18   Temp:  98.6 °F (37 °C)  98 °F (36.7 °C)   TempSrc:  Axillary  Oral   SpO2:  98% (!) 92% 97%   Weight:       Height:         P.E.:  GEN: A x O x 3, NAD  SKIN: No jaundice  HEENT: EOMI, PERRL, anicteric sclera  CV: RRR, no M/R/G  Chest: CTA B  Abdomen: soft, NTND, normoactive BS  Ext: No C/C/E.  2+ dorsalis pedis pulses B  Neuro: No asterixes or tremors.  CN II-XII intact  Musculoskeletal: 5/5 strength bilaterally    Labs:  Recent Results (from the past 336 hour(s))   CBC auto differential    Collection Time: 01/09/18  6:59 AM   Result Value Ref Range    WBC 15.00 (H) 3.90 - 12.70 K/uL    Hemoglobin 12.6 12.0 - 16.0 g/dL    Hematocrit 38.4 37.0 - 48.5 %    Platelets 428 (H) 150 - 350 K/uL   CBC W/ AUTO DIFFERENTIAL    Collection Time: 01/08/18   4:28 PM   Result Value Ref Range    WBC 13.71 (H) 3.90 - 12.70 K/uL    Hemoglobin 12.9 12.0 - 16.0 g/dL    Hematocrit 40.3 37.0 - 48.5 %    Platelets 432 (H) 150 - 350 K/uL     CMP  Sodium   Date Value Ref Range Status   01/09/2018 147 (H) 136 - 145 mmol/L Final     Potassium   Date Value Ref Range Status   01/09/2018 3.4 (L) 3.5 - 5.1 mmol/L Final     Chloride   Date Value Ref Range Status   01/09/2018 108 95 - 110 mmol/L Final     CO2   Date Value Ref Range Status   01/09/2018 28 23 - 29 mmol/L Final     Glucose   Date Value Ref Range Status   01/09/2018 165 (H) 70 - 110 mg/dL Final     BUN, Bld   Date Value Ref Range Status   01/09/2018 10 8 - 23 mg/dL Final     Creatinine   Date Value Ref Range Status   01/09/2018 1.1 0.5 - 1.4 mg/dL Final     Calcium   Date Value Ref Range Status   01/09/2018 9.6 8.7 - 10.5 mg/dL Final     Total Protein   Date Value Ref Range Status   01/09/2018 7.5 6.0 - 8.4 g/dL Final     Albumin   Date Value Ref Range Status   01/09/2018 3.6 3.5 - 5.2 g/dL Final     Total Bilirubin   Date Value Ref Range Status   01/09/2018 0.5 0.1 - 1.0 mg/dL Final     Comment:     For infants and newborns, interpretation of results should be based  on gestational age, weight and in agreement with clinical  observations.  Premature Infant recommended reference ranges:  Up to 24 hours.............<8.0 mg/dL  Up to 48 hours............<12.0 mg/dL  3-5 days..................<15.0 mg/dL  6-29 days.................<15.0 mg/dL       Alkaline Phosphatase   Date Value Ref Range Status   01/09/2018 163 (H) 55 - 135 U/L Final     AST   Date Value Ref Range Status   01/09/2018 17 10 - 40 U/L Final     ALT   Date Value Ref Range Status   01/09/2018 21 10 - 44 U/L Final     Anion Gap   Date Value Ref Range Status   01/09/2018 11 8 - 16 mmol/L Final     eGFR if    Date Value Ref Range Status   01/09/2018 60 >60 mL/min/1.73 m^2 Final     eGFR if non    Date Value Ref Range Status    01/09/2018 52 (A) >60 mL/min/1.73 m^2 Final     Comment:     Calculation used to obtain the estimated glomerular filtration  rate (eGFR) is the CKD-EPI equation.          No results for input(s): PT, INR, APTT in the last 24 hours.    CT of Abdomen/Pelvis with Contrast:  Impression:       1.  Intrahepatic and extrahepatic biliary ductal dilatation of unclear etiology.  Gallbladder is not seen and likely has been removed.  Further evaluation may be warranted with MRCP or ERCP.    2.  No CT evidence to suggest acute pancreatitis as clinically questioned.    3.  Colonic diverticulosis with no evidence of acute diverticulitis.    4.  Additional findings as detailed above.     A/P:  The patient is a 67 year old woman with a history of HTN, mitral valve prolapse, DM, arthritis, and asthma presenting with hypertensive emergency, a NSTEMI, abdominal pain, nausea, and emesis.  1.  Abdominal Pain/Emesis - the patient states her abdominal pain and emesis may have resolved, but she still has some nausea.  The etiology of this is unclear.  She may have a gastroenteritis, but she denies having any sick contacts, traveling, or eating anything unusual.  Her hypertensive emergency may be secondary to not being able to take her anti-hypertensive medications.  Records suggest she had surgery on her pancreas, but she denies this.  Her pancreatic enzymes normalized quickly and a CT scan did not show pancreatic abnormalities, so it is unclear if she truly had pancreatitis.  Her gallbladder has been removed and this may be why she has dilated bile ducts.  The patient can undergo an outpatient EUS to better evaluate the pancreas and bile ducts.  An EGD was offered tomorrow for her nausea and emesis.  She would like to wait on the results of her stress test and how she does overnight.  Tomorrow, it can be determined if she can undergo an EGD.  The patient would eventually also benefit from an outpatient surveillance colonoscopy.    Thank  you for this consult.

## 2018-01-09 NOTE — H&P
[facility] Hospital Medicine  History & Physical    Patient Name: Raissa Land  MRN: [id number]  Admission Date: 01/09/2018  Attending Physician: Daniel Leonard MD, MPH      PCP:     Guerrero Hensley Jr, MD    CC:     Chief Complaint   Patient presents with    Abdominal Pain     Generalized intermittent abdominal pain that started Friday. +n/v        HISTORY OF PRESENT ILLNESS:     Raissa Land is a 67 y.o. female that (in part)  has a past medical history of Anxiety; Arthritis; Asthma; Bronchiectasis with acute exacerbation; Bronchitis; Chronic pain; Essential hypertension, benign; Hypotension, iatrogenic; Insomnia; Knee pain, right; Mitral valve prolapse; Obstructive chronic bronchitis without exacerbation; Type II or unspecified type diabetes mellitus without mention of complication, uncontrolled; and Type II or unspecified type diabetes mellitus without mention of complication, uncontrolled. Presents to [facility] Emergency Department  presents with several hour duration of epigastric pain. Patient reports no history of previous pancreatitis that has required hospitalization.  Patient denies having any alcoholic drinks. Patient is experiencing persistent, deep, aching abdominal pain and points to the epigastric area when asked where the most severe pain is located. Patient admits to having associated symptoms including nausea with vomiting, but denies fever and chills, and has not been able to eat or drink since onset of abdominal pain.  Pain is exacerbated by palpation of the epigastrium as well as oral intake.  Relieved with pain medication given in the ER. Complains that the pain is severe, progressive, and worsening.    She has had previous pancreatic surgery.  Reports associated symptoms of constipation no bowel movements in 3 days.  Also reportedly febrile 100.6°F.  Initially she was hypertensive with a blood pressure of 220 and was minimally responsive to initial  dose of labetalol.  She was also found to have leukocytosis as well as anemia with an anion gap of 16.  Her influenza screen was negative.  CT abdomen pelvis was also obtained but did not show any abnormalities.  She has had low urine output and was given IV fluids in the ED and we are pending urinalysis.  General surgery has been consulted.      In the emergency department routine labs including lipase were obtained along with abdominal imaging.  Findings were consistent with acute pancreatitis.  She also had evidence of new T-wave inversions in anterolateral leads as well as elevated troponin level.  There is concern she was having hypertensive emergency due to a systolic blood pressure of ~220mmHg.     The patient is being admitted to Hospital medicine service for IV fluids, bowel rest, and serial monitoring of pancreatic enzymes in addition to further evaluation and treatment as outlined below in detail.  GI consultation if symptoms do not improve within a reasonable amount of time.        REVIEW OF SYSTEMS:     -- Constitutional: +Fever.  No chills.  -- Eyes: No visual changes, diplopia, pain, tearing, blind spots, or discharge.   -- Ears, nose, mouth, throat, and face: No congestion, sore throat, epistaxis, d/c, bleeding gums, neck stiffness masses, or dental issues.  -- Respiratory: No cough, shortness of breath, hemoptysis, stridor, wheezing, or night sweats.  -- Cardiovascular: + Epigastric pain.  No chest pain, GRACE, syncope, PND, edema, cyanosis, or palpitations.   -- Gastrointestinal: As above in history of present illness.  -- Genitourinary: No hematuria, dysuria, frequency, urgency, nocturia, polyuria, stones, or incontinence.  -- Integument/breast: No rash, pruritis, pigmentation changes, dryness, or changes in hair  -- Hematologic/lymphatic: No easy bruising or lymphadenopathy.   -- Musculoskeletal: chronic arthralgias.   No acute arthralgias, acute myalgias, joint swelling, acute limitations of ROM,  or acute muscular weakness.  -- Neurological: No seizures, headaches, incoordination, paraesthesias, ataxia, vertigo, or tremors.  -- Behavioral/Psych: No auditory or visual hallucinations, depression, or suicidal/homicidal ideations.  -- Endocrine: No heat or cold intolerance, polydipsia, or unintentional weight gain / loss.  -- Allergy/Immunologic: No recurrent infections or adverse reaction to food, insects, or difficulty breathing.      PAST MEDICAL / SURGICAL HISTORY:     Past Medical History:   Diagnosis Date    Anxiety     Arthritis     Asthma     Bronchiectasis with acute exacerbation     Bronchitis     Chronic pain     right knee    Essential hypertension, benign 11/21/2012    Hypotension, iatrogenic     Insomnia     Knee pain, right     chronic    Mitral valve prolapse     Obstructive chronic bronchitis without exacerbation 11/21/2012    Type II or unspecified type diabetes mellitus without mention of complication, uncontrolled     Type II or unspecified type diabetes mellitus without mention of complication, uncontrolled      Past Surgical History:   Procedure Laterality Date    bowel reconstruction      BREAST SURGERY      CHOLECYSTECTOMY      HYSTERECTOMY      KNEE SURGERY      R, surgeries x 7    Right Leg reconstruction surgery      Rotator Cuff Surgery      TONSILLECTOMY      WRIST SURGERY           FAMILY HISTORY:     Family History   Problem Relation Age of Onset    Heart disease Sister     Heart disease Brother     Hypertension Mother          SOCIAL HISTORY:     Social History   Substance Use Topics    Smoking status: Current Every Day Smoker     Packs/day: 0.25     Years: 45.00     Types: Cigarettes    Smokeless tobacco: Never Used    Alcohol use No     Social History     Social History    Marital status:      Spouse name: N/A    Number of children: N/A    Years of education: N/A     Social History Main Topics    Smoking status: Current Every Day Smoker  "    Packs/day: 0.25     Years: 45.00     Types: Cigarettes    Smokeless tobacco: Never Used    Alcohol use No    Drug use: Yes     Types: Marijuana    Sexual activity: Not Currently     Partners: Male     Birth control/ protection: None     Other Topics Concern    None     Social History Narrative    . Adult children. On disability.          ALLERGIES:       Review of patient's allergies indicates:  No Known Allergies      HEALTH SCREENING:     Influenza vaccine not up-to-date for this season.  Prevnar 13 pneumonia vaccine = no evidence of previous vaccination found in the medical record      HOME MEDICATIONS:     Prior to Admission medications    Medication Sig Start Date End Date Taking? Authorizing Provider   ADVAIR DISKUS 250-50 mcg/dose diskus inhaler  5/2/13   Historical Provider, MD   bumetanide (BUMEX) 2 MG tablet  3/20/13   Historical Provider, MD   diclofenac sodium (VOLTAREN) 1 % Gel Apply 2 g topically 4 (four) times daily. 2/26/14   Veda Salazar MD   gabapentin (NEURONTIN) 600 MG tablet  4/15/13   Historical Provider, MD   LEVEMIR FLEXPEN 100 unit/mL (3 mL) InPn  4/13/13   Historical Provider, MD   lisinopril 10 MG tablet  5/2/13   Historical Provider, MD   NOVOLOG FLEXPEN 100 unit/mL InPn  5/26/13   Historical Provider, MD   potassium chloride (MICRO-K) 10 MEQ CpSR Take 10 mEq by mouth once daily.      Historical Provider, MD   quetiapine (SEROQUEL) 100 MG Tab Take by mouth every evening.    Historical Provider, MD   sertraline (ZOLOFT) 100 MG tablet  5/2/13   Historical Provider, MD   SURE COMFORT PEN NEEDLE 31 x 3/16 " Ndle  5/8/13   Historical Provider, MD   trazodone (DESYREL) 100 MG tablet Take 100 mg by mouth every evening.    Historical Provider, MD   desmopressin 10 mcg/spray (0.1 mL) Spry  4/15/13 1/9/18  Historical Provider, MD   fluconazole (DIFLUCAN) 150 MG Tab  2/25/13 1/9/18  Historical Provider, MD          HOSPITAL MEDICATIONS:     Scheduled Meds:    aspirin  325 mg " Oral Daily    clopidogrel  75 mg Oral Daily    fluticasone-salmeterol 250-50 mcg/dose  1 puff Inhalation BID    lisinopril  10 mg Oral Daily    metoprolol tartrate  25 mg Oral Daily    pantoprazole  40 mg Intravenous Daily    sertraline  100 mg Oral QHS     Continuous Infusions:    lactated ringers       PRN Meds: acetaminophen, acetaminophen, dextrose 50%, glucagon (human recombinant), hydrALAZINE, influenza, insulin aspart, metoprolol, ondansetron, pneumoc 13-amos conj-dip cr(PF), promethazine      PHYSICAL EXAM:     Wt Readings from Last 1 Encounters:   01/09/18 0150 98 kg (216 lb 0.8 oz)   01/09/18 0144 96.8 kg (213 lb 6.5 oz)   01/08/18 1300 94.8 kg (209 lb)     Body mass index is 42.19 kg/m².  Vitals:    01/09/18 0125 01/09/18 0144 01/09/18 0150 01/09/18 0359   BP:  (!) 181/91  (!) 197/88   BP Location:  Left arm  Left arm   Patient Position:  Lying  Lying   Pulse:  87  72   Resp: 18 18 18   Temp: 98.5 °F (36.9 °C) 98.7 °F (37.1 °C)  98.8 °F (37.1 °C)   TempSrc: Oral Oral  Oral   SpO2:  96%  97%   Weight:  96.8 kg (213 lb 6.5 oz) 98 kg (216 lb 0.8 oz)    Height:   5' (1.524 m)           -- General appearance: well developed. appears stated age   -- Head: normocephalic, atraumatic   -- Eyes: conjunctivae clear. Extraocular muscles intact  -- Nose: Nares normal. Septum midline.   -- Mouth/Throat: lips, mucosa, and tongue normal. no throat erythema.   -- Neck: supple, symmetrical, trachea midline, no JVD and thyroid not grossly enlarged, appears symmetric  -- Lungs: clear to auscultation bilaterally. normal respiratory effort. No use of accessory muscles.   -- Chest wall: no tenderness. equal bilateral chest rise   -- Heart: regular rate and regular rhythm. S1, S2 normal.  no click, rub or gallop   -- Abdomen: Epigastric pain with palpation.  Otherwise abdomen is soft, non-distended, non-tympanic; bowel sounds normal; no masses, but exam is limited by body habitus  -- Extremities: no cyanosis, clubbing or  edema.   -- Pulses: Bounding, 2+ and symmetric   -- Skin: color normal, texture normal, turgor normal. No rashes or lesions.   -- Neurologic: Normal strength and tone. No focal numbness or weakness. CNII-XII intact. Boogie coma scale: eyes open spontaneously-4, oriented & converses-5, obeys commands-6.      LABORATORY STUDIES:     Recent Results (from the past 36 hour(s))   CBC W/ AUTO DIFFERENTIAL    Collection Time: 01/08/18  4:28 PM   Result Value Ref Range    WBC 13.71 (H) 3.90 - 12.70 K/uL    RBC 4.88 4.00 - 5.40 M/uL    Hemoglobin 12.9 12.0 - 16.0 g/dL    Hematocrit 40.3 37.0 - 48.5 %    MCV 83 82 - 98 fL    MCH 26.4 (L) 27.0 - 31.0 pg    MCHC 32.0 32.0 - 36.0 g/dL    RDW 15.2 (H) 11.5 - 14.5 %    Platelets 432 (H) 150 - 350 K/uL    MPV 10.4 9.2 - 12.9 fL    Gran # 11.4 (H) 1.8 - 7.7 K/uL    Lymph # 1.5 1.0 - 4.8 K/uL    Mono # 0.8 0.3 - 1.0 K/uL    Eos # 0.0 0.0 - 0.5 K/uL    Baso # 0.02 0.00 - 0.20 K/uL    Gran% 82.9 (H) 38.0 - 73.0 %    Lymph% 10.9 (L) 18.0 - 48.0 %    Mono% 5.6 4.0 - 15.0 %    Eosinophil% 0.3 0.0 - 8.0 %    Basophil% 0.1 0.0 - 1.9 %    Differential Method Automated    Comp. Metabolic Panel    Collection Time: 01/08/18  4:28 PM   Result Value Ref Range    Sodium 146 (H) 136 - 145 mmol/L    Potassium 4.1 3.5 - 5.1 mmol/L    Chloride 109 95 - 110 mmol/L    CO2 27 23 - 29 mmol/L    Glucose 146 (H) 70 - 110 mg/dL    BUN, Bld 9 8 - 23 mg/dL    Creatinine 1.1 0.5 - 1.4 mg/dL    Calcium 10.0 8.7 - 10.5 mg/dL    Total Protein 8.0 6.0 - 8.4 g/dL    Albumin 3.7 3.5 - 5.2 g/dL    Total Bilirubin 0.4 0.1 - 1.0 mg/dL    Alkaline Phosphatase 179 (H) 55 - 135 U/L    AST 13 10 - 40 U/L    ALT 21 10 - 44 U/L    Anion Gap 10 8 - 16 mmol/L    eGFR if African American 60 >60 mL/min/1.73 m^2    eGFR if non African American 52 (A) >60 mL/min/1.73 m^2   Lipase    Collection Time: 01/08/18  4:28 PM   Result Value Ref Range    Lipase 323 (H) 4 - 60 U/L   Urinalysis - Clean Catch    Collection Time: 01/08/18  4:28  PM   Result Value Ref Range    Specimen UA Urine, Clean Catch     Color, UA Yellow Yellow, Straw, Chelsi    Appearance, UA Clear Clear    pH, UA 5.0 5.0 - 8.0    Specific Gravity, UA 1.020 1.005 - 1.030    Protein, UA 1+ (A) Negative    Glucose, UA Negative Negative    Ketones, UA 1+ (A) Negative    Bilirubin (UA) Negative Negative    Occult Blood UA Negative Negative    Nitrite, UA Negative Negative    Urobilinogen, UA Negative <2.0 EU/dL    Leukocytes, UA Negative Negative   Urinalysis Microscopic    Collection Time: 01/08/18  4:28 PM   Result Value Ref Range    RBC, UA 1 0 - 4 /hpf    WBC, UA 1 0 - 5 /hpf    Bacteria, UA Few (A) None-Occ /hpf    Yeast, UA Occasional (A) None    Squam Epithel, UA 3 /hpf    Hyaline Casts, UA 0 0-1/lpf /lpf    Microscopic Comment SEE COMMENT    Troponin I    Collection Time: 01/08/18  6:54 PM   Result Value Ref Range    Troponin I 0.198 (H) 0.000 - 0.026 ng/mL   Brain natriuretic peptide    Collection Time: 01/08/18  6:54 PM   Result Value Ref Range     (H) 0 - 99 pg/mL   Troponin I    Collection Time: 01/09/18 12:37 AM   Result Value Ref Range    Troponin I 0.191 (H) 0.000 - 0.026 ng/mL       Lab Results   Component Value Date    INR 1.0 10/25/2016     Lab Results   Component Value Date    HGBA1C 10.6 (H) 11/21/2012     No results for input(s): POCTGLUCOSE in the last 72 hours.          IMAGING:     Imaging Results          CT Abdomen Pelvis With Contrast (Final result)  Result time 01/08/18 20:14:54    Final result by Garry Ballesteros MD (01/08/18 20:14:54)                 Impression:      1.  Intrahepatic and extrahepatic biliary ductal dilatation of unclear etiology.  Gallbladder is not seen and likely has been removed.  Further evaluation may be warranted with MRCP or ERCP.    2.  No CT evidence to suggest acute pancreatitis as clinically questioned.    3.  Colonic diverticulosis with no evidence of acute diverticulitis.    4.  Additional findings as detailed  above.        Electronically signed by: CHASE CHAN MD  Date:     01/08/18  Time:    20:14              Narrative:    Clinical indication: 67-year-old female evaluate for pancreatitis.    Comparison: None.    Technique: Transaxial images were obtained through the abdomen and pelvis in 5 mm increments.  80 cc of Omnipaque 350 IV contrast were administered.      Findings:  Heart is mildly enlarged with coronary artery calcification seen.  The lung bases are clear.    The liver is normal in size, contour, and attenuation with no focal hepatic abnormality.  There is intrahepatic and extrahepatic biliary ductal dilatation of uncertain etiology.  Common duct measures upper is of 18 mm in diameter.  Gallbladder has been removed.  No evidence of pancreatic duct dilatation.  No surrounding peripancreatic inflammatory changes to suggest acute pancreatitis.  Stomach, spleen, and adrenal glands are unremarkable.    Kidneys are functioning.  No hydronephrosis.  Ureters and urinary bladder are unremarkable.  Uterus has been removed.      There is colonic diverticulosis involving the descending and sigmoid colon without evidence for acute diverticulitis.  The visualized loops of small and large bowel show no evidence of obstruction or inflammation.  No free air or free fluid.    Aorta tapers normally.     Degenerative changes are seen in the spine with severe facet arthropathy resulting in grade 1 anterolisthesis of L5 on S1.  Subcutaneous soft tissue structures are unremarkable.                                CONSULTS:     IP CONSULT TO CARDIOLOGY  IP CONSULT TO GI       ASSESSMENT & PLAN:     Primary Diagnosis:  Hypertensive emergency    Active Hospital Problems    Diagnosis  POA    *Hypertensive emergency [I16.1]  Yes     Priority: 1 - High    NSTEMI (non-ST elevated myocardial infarction) [I21.4]  Yes     Priority: 2     Dehydration with hypernatremia [E87.0]  Yes     Priority: 3     DM type 2 (diabetes mellitus, type 2)  [E11.9]  Yes    Acute pancreatitis [K85.90]  Unknown    Smoking [F17.200]  Yes    Chronic pain of multiple sites [R52, G89.29]  Yes    Obesity [E66.9]  Yes    Essential hypertension, benign [I10]  Yes     Chronic      Resolved Hospital Problems    Diagnosis Date Resolved POA   No resolved problems to display.         Hypertensive emergency  · Presents with a systolic blood pressure of approximately 220 mmHg and elevated troponin.    · In order to decrease the risk of acute stroke, we will initiate blood pressure medications with a goal of a decrease of 30% in the next 24 hours.  · Thereafter, the goal while inpatient is a systolic blood pressure less than 160mmHg  · BP not in acceptable range at this time  · Initiate oral regimen as well as providing PRN IV medications  · May need to escalate to Cardene drip for tighter blood pressure control  · Continue current regimen    NSTEMI (non-ST elevated myocardial infarction)    Elevated troponin level.  Likely cardiac strain secondary to uncontrolled hypertension as noted above.    Dehydration with hypernatremia    Dehydration secondary to nausea, vomiting, diarrhea.  Likely viral gastroenteritis.  Leukocytosis without granulocytosis.  Providing IV fluids.  Watching sodium levels closely.    Chronic pain of multiple sites    Supportive care.  No acute issues    DM type 2 (diabetes mellitus, type 2)    · BG in acceptable range at this time  · Maintain w/ subcutaneous insulin management order set  · Hold oral diabetic meds  · ADA 1800 kcal diet  · BG goal while in patient is <180mg/dL  · HgA1c = Pending    Essential hypertension, benign  · Goal while inpatient is a systolic blood pressure less than 160mmHg  · BP in acceptable range at this time  · Continue current home regimen with hold parameters  · PRN antihypertensives available        Obesity    · Body mass index is 42.19 kg/m².  · Order a cardiac diet  · Counseling given  · Nutrition consult as an  outpatient          Acute pancreatitis  · Evidenced by hx, physical exam, imaging, and laboratory studies  · Differential of common etiology includes: alcohol, gallstones, metabolic disorders (hereditary pancreatitis, hypercalcemia, elevated triglycerides, malnutrition), abdominal trauma, penetrating ulcers, carcinoma of the head of pancreas, drugs (diuretics, gliptins, tetracycline, sulfonamides, estrogens, azathioprine and mercaptopurine, pentamidine, salicylates, steroids),  infections  (mumps, viral hepatitis, coxsackie B virus, cytomegalovirus, Mycoplasma pneumoniae, Ascaris), structural abnormalities (choledochocele, pancreas divisum), or autoimmune pancreatitis    · Lipase = 323    Memo's Criteria @ TIME OF ADMISSION  · Age >55 = yes  · WBC >16K = no  · BG >200 = no  · AST > 250 = no  · LDH > 350 = not ordered at the time of admission     Conclusion:  Patient has less than three criteria; does not meet ICU admission criteria.    · NPO / Bowel rest  · IV fluids  · Monitor serial lipase levels  · +/- GI consult as clinical course dictates        Smoking  · Chronic tobacco use  · Tobacco cessation counseling  · Nicotine patch contraindicated with possible ACS; consider Wellbutrin or Chantix through PCP as an outpatient (will require closer monitoring)          VTE Risk Mitigation         Ordered     Medium Risk of VTE  Once      01/09/18 0132     Place sequential compression device  Until discontinued      01/09/18 0132     Place GENARO hose  Until discontinued      01/09/18 0132            Adult PRN medications available   DVT prophylaxis given       DISPOSITION:     Will admit to the Hospital Medicine service for further evaluation and treatment.    Chart reviewed and updated where applicable.    High Risk Conditions:  Patient has a condition that poses threat to life and bodily function: Acute pancreatitis; NSTEMI      ===============================================================    Daniel Leonard MD,  MPH  Department of Hospital Medicine   [facility]  [phone] pg  (7pm - 6am)          This note is dictated using Dragon Medical 360 voice recognition software.  There are word recognition mistakes that are occasionally missed on review.

## 2018-01-09 NOTE — ED NOTES
Pt. Taken to the Main side ED, placed in gown, cardiac monitor, auto b/p cuff. 12- Lead EKG done.daughter at bedside.

## 2018-01-09 NOTE — PLAN OF CARE
Problem: Nausea/Vomiting (Adult)  Intervention: Minimize Nausea Triggers/Manage Symptoms   01/09/18 0742   Monitor/Manage Hypovolemia   Nausea/Vomiting Interventions cool cloth applied;medication given;slow deep breathing encouraged   Coping/Psychosocial Interventions   Environmental Support calm environment promoted;distractions minimized;environmental consistency promoted;rest periods encouraged     Intervention: Position to Prevent Aspiration   01/09/18 0742   Positioning   Head of Bed (HOB) HOB at 30-45 degrees   Body Position positioned/repositioned independently     Intervention: Promote/Maintain Hydration   01/09/18 0742   Nutrition Interventions   Fluid/Electrolyte Management intravenous fluid replacement initiated       Goal: Symptom Relief  Patient will demonstrate the desired outcomes by discharge/transition of care.  Outcome: Ongoing (interventions implemented as appropriate)   01/09/18 0742   Nausea/Vomiting (Adult)   Symptom Relief making progress toward outcome     Goal: Adequate Hydration  Patient will demonstrate the desired outcomes by discharge/transition of care.  Outcome: Ongoing (interventions implemented as appropriate)   01/09/18 0742   Nausea/Vomiting (Adult)   Adequate Hydration making progress toward outcome

## 2018-01-09 NOTE — ASSESSMENT & PLAN NOTE
Dehydration secondary to nausea, vomiting, diarrhea.  Likely viral gastroenteritis.  Leukocytosis without granulocytosis.  Providing IV fluids.  Watching sodium levels closely.

## 2018-01-09 NOTE — HPI
Raissa Land is a 67 y.o. female that (in part)  has a past medical history of Anxiety; Arthritis; Asthma; Bronchiectasis with acute exacerbation; Bronchitis; Chronic pain; Essential hypertension, benign; Hypotension, iatrogenic; Insomnia; Knee pain, right; Mitral valve prolapse; Obstructive chronic bronchitis without exacerbation; Type II or unspecified type diabetes mellitus without mention of complication, uncontrolled; and Type II or unspecified type diabetes mellitus without mention of complication, uncontrolled. Presents to [facility] Emergency Department  presents with several hour duration of epigastric pain. Patient reports no history of previous pancreatitis that has required hospitalization.  Patient denies having any alcoholic drinks. Patient is experiencing persistent, deep, aching abdominal pain and points to the epigastric area when asked where the most severe pain is located. Patient admits to having associated symptoms including nausea with vomiting, but denies fever and chills, and has not been able to eat or drink since onset of abdominal pain.  Pain is exacerbated by palpation of the epigastrium as well as oral intake.  Relieved with pain medication given in the ER. Complains that the pain is severe, progressive, and worsening.    She has had previous pancreatic surgery.  Reports associated symptoms of constipation no bowel movements in 3 days.  Also reportedly febrile 100.6°F.  Initially she was hypertensive with a blood pressure of 220 and was minimally responsive to initial dose of labetalol.  She was also found to have leukocytosis as well as anemia with an anion gap of 16.  Her influenza screen was negative.  CT abdomen pelvis was also obtained but did not show any abnormalities.  She has had low urine output and was given IV fluids in the ED and we are pending urinalysis.  General surgery has been consulted.      In the emergency department routine labs including  lipase were obtained along with abdominal imaging.  Findings were consistent with acute pancreatitis.  She also had evidence of new T-wave inversions in anterolateral leads as well as elevated troponin level.  There is concern she was having hypertensive emergency due to a systolic blood pressure of ~220mmHg.     The patient is being admitted to Hospital medicine service for IV fluids, bowel rest, and serial monitoring of pancreatic enzymes in addition to further evaluation and treatment as outlined below in detail.  GI consultation if symptoms do not improve within a reasonable amount of time.

## 2018-01-09 NOTE — ASSESSMENT & PLAN NOTE
?r/t GI process  Note mad eof elev trop/flat pattern without angina/dyspnea, likely demand related  Cont med rx HTN  Check echo and MPI today  Further recs pending above testing

## 2018-01-09 NOTE — ED PROVIDER NOTES
Encounter Date: 1/8/2018    SCRIBE #1 NOTE: I, Javier Gabino II, am scribing for, and in the presence of,  Desire Rosas MD. I have scribed the following portions of the note - Other sections scribed: HPI, ROS, PE.       History     Chief Complaint   Patient presents with    Abdominal Pain     Generalized intermittent abdominal pain that started Friday. +n/v      CC: Abdominal Pain     HPI: This 67 y.o. female with asthma, bronchitis, insomnia, chronic knee pain, arthritis, anxiety, essential HTN, and IDDM presents to the ED c/o acute onset, abdominal pain with nausea and vomiting x4 days. Pt states she has been unable to eat or drink without vomiting. Pt reports an episode of drinking soup through a straw and vomiting shortly after. She reports she is constantly dry heaving without alleviation. She reports taking Pepto Bismol and Phenergan from a similar episode diagnosed as dehydration. Pt describes her abdominal pain as a cramping sensation. Pt denies sick encounters. Pt denies fever, chills, and sore throat.         The history is provided by the patient. No  was used.     Review of patient's allergies indicates:  No Known Allergies  Past Medical History:   Diagnosis Date    Anxiety     Arthritis     Asthma     Bronchiectasis with acute exacerbation     Bronchitis     Chronic pain     right knee    Essential hypertension, benign 11/21/2012    Hypotension, iatrogenic     Insomnia     Knee pain, right     chronic    Mitral valve prolapse     Obstructive chronic bronchitis without exacerbation 11/21/2012    Type II or unspecified type diabetes mellitus without mention of complication, uncontrolled     Type II or unspecified type diabetes mellitus without mention of complication, uncontrolled      Past Surgical History:   Procedure Laterality Date    bowel reconstruction      BREAST SURGERY      CHOLECYSTECTOMY      HYSTERECTOMY      KNEE SURGERY      R, surgeries x 7    Right  Leg reconstruction surgery      Rotator Cuff Surgery      TONSILLECTOMY      WRIST SURGERY       Family History   Problem Relation Age of Onset    Heart disease Sister     Heart disease Brother     Hypertension Mother      Social History   Substance Use Topics    Smoking status: Current Every Day Smoker     Packs/day: 0.25     Years: 45.00     Types: Cigarettes    Smokeless tobacco: Never Used    Alcohol use No     Review of Systems   Constitutional: Negative for chills and fever.   HENT: Negative for congestion, ear pain, rhinorrhea and sore throat.    Eyes: Negative for pain and visual disturbance.   Respiratory: Negative for cough and shortness of breath.    Cardiovascular: Negative for chest pain.   Gastrointestinal: Positive for abdominal pain, nausea and vomiting. Negative for diarrhea.   Genitourinary: Negative for dysuria.   Musculoskeletal: Negative for back pain and neck pain.   Skin: Negative for rash.   Neurological: Negative for headaches.       Physical Exam     Initial Vitals [01/08/18 1300]   BP Pulse Resp Temp SpO2   (!) 201/90 95 20 98.2 °F (36.8 °C) (!) 94 %      MAP       127         Physical Exam    Nursing note and vitals reviewed.  Constitutional: She appears well-developed and well-nourished. She appears distressed (mild distress).   HENT:   Head: Normocephalic.   Mouth/Throat: Oropharynx is clear and moist.   Eyes: EOM are normal.   Neck: Normal range of motion.   Cardiovascular: Regular rhythm, normal heart sounds, intact distal pulses and normal pulses. Tachycardia present.  Exam reveals no friction rub and no decreased pulses.    No murmur heard.  Pulses:       Radial pulses are 2+ on the right side, and 2+ on the left side.        Dorsalis pedis pulses are 2+ on the right side, and 2+ on the left side.   Pulmonary/Chest: Breath sounds normal. No respiratory distress. She has no wheezes. She has no rales.   Abdominal: Soft. Bowel sounds are normal. She exhibits no distension.  There is tenderness in the epigastric area. There is no rigidity, no rebound, no guarding, no CVA tenderness, no tenderness at McBurney's point and negative Edge's sign.       Musculoskeletal: Normal range of motion.   Neurological: She is alert.   Skin: Skin is warm and dry.   Psychiatric: She has a normal mood and affect.         ED Course   Critical Care  Date/Time: 1/8/2018 9:22 PM  Performed by: SARITHA SALGADO  Authorized by: SARITHA SALGADO   Direct patient critical care time: 15 minutes  Additional history critical care time: 5 minutes  Ordering / reviewing critical care time: 5 minutes  Documentation critical care time: 7 minutes  Consulting other physicians critical care time: 8 minutes  Total critical care time (exclusive of procedural time) : 40 minutes  Critical care time was exclusive of teaching time and separately billable procedures and treating other patients.  Critical care was necessary to treat or prevent imminent or life-threatening deterioration of the following conditions: cardiac failure.  Critical care was time spent personally by me on the following activities: blood draw for specimens, development of treatment plan with patient or surrogate, discussions with consultants, evaluation of patient's response to treatment, examination of patient, obtaining history from patient or surrogate, ordering and performing treatments and interventions, ordering and review of laboratory studies, ordering and review of radiographic studies, re-evaluation of patient's condition, pulse oximetry and review of old charts.        Labs Reviewed   CBC W/ AUTO DIFFERENTIAL - Abnormal; Notable for the following:        Result Value    WBC 13.71 (*)     MCH 26.4 (*)     RDW 15.2 (*)     Platelets 432 (*)     Gran # 11.4 (*)     Gran% 82.9 (*)     Lymph% 10.9 (*)     All other components within normal limits   COMPREHENSIVE METABOLIC PANEL - Abnormal; Notable for the following:     Sodium 146 (*)     Glucose 146 (*)      Alkaline Phosphatase 179 (*)     eGFR if non  52 (*)     All other components within normal limits   LIPASE - Abnormal; Notable for the following:     Lipase 323 (*)     All other components within normal limits   URINALYSIS - Abnormal; Notable for the following:     Protein, UA 1+ (*)     Ketones, UA 1+ (*)     All other components within normal limits   URINALYSIS MICROSCOPIC - Abnormal; Notable for the following:     Bacteria, UA Few (*)     Yeast, UA Occasional (*)     All other components within normal limits   TROPONIN I - Abnormal; Notable for the following:     Troponin I 0.198 (*)     All other components within normal limits   B-TYPE NATRIURETIC PEPTIDE - Abnormal; Notable for the following:      (*)     All other components within normal limits     EKG Readings: (Independently Interpreted)   Initial Reading: No STEMI. Heart Rate: 95. Ectopy: PVCs. T Waves Flipped: III, V4, V5, V6 and V3.          Medical Decision Making:   History:   Old Medical Records: I decided to obtain old medical records.  Initial Assessment:   This is an emergent evaluation of a 67-year-old woman who presented to the emergency department secondary to nausea and vomiting since Thursday.  Differential Diagnosis:   Pancreatitis, gastritis, peptic ulcer disease, ACS, viral syndrome, amongst others.  Independently Interpreted Test(s):   I have ordered and independently interpreted EKG Reading(s) - see prior notes  Clinical Tests:   Lab Tests: Ordered and Reviewed  Radiological Study: Ordered and Reviewed  ED Management:  On physical examination, patient appeared uncomfortable.  She was mildly tachycardic.  She had tenderness to palpation to the midepigastric area of her abdomen without rebound or guarding.  There was no tenderness at McBurney's point or Edge's point.  Oropharynx was clear and moist.  Labs were obtained from triage and were concerning for an elevated lipase.  Patient has been ordered Zofran  and IV fluids.  CT scan of the abdomen and pelvis have additionally been ordered at this time. I have added an EKG and Troponin/BNP given patient is a diabetic, to ensure this is not an anginal equivalent. Disposition is pending.    Desire Rosas MD  6:19 PM  1/8/2018    Patient's EKG shows T wave inversions in the lateral leads. Troponin is elevated at 0.198.  BNP is mildly elevated at 224.  Patient has been given an aspirin.  I am repeating her EKG at this time. CT scan w/o evidence of acute pancreatitis.  She will be admitted for an NSTEMI at this time.     Desire Rosas MD  8:29 PM  1/8/2018    Repeat EKG without evidence for acute STEMI however continues to show T wave inversions in lateral and inferior leads. Discussed with Dr. Leonard, who agreed to admit the patient to Dr. Marroquin. I have ordered aspirin, beta blocker, plavix, and lisinopril on admission, made her NPO except for sips with medicine, and ordered an insulin sliding scale. Cardiology consult placed.     Desire Rosas MD  9:27 PM  1/8/2018              Other:   I have discussed this case with another health care provider.       <> Summary of the Discussion: Dr. Leonard, who agreed to admit the patient.                 Scribe Attestation:   Scribe #1: I performed the above scribed service and the documentation accurately describes the services I performed. I attest to the accuracy of the note.    Attending Attestation:           Physician Attestation for Scribe:  Physician Attestation Statement for Scribe #1: I, Desire Rosas MD, reviewed documentation, as scribed by Javier Lloyd II in my presence, and it is both accurate and complete.                 ED Course      Clinical Impression:   The primary encounter diagnosis was NSTEMI (non-ST elevated myocardial infarction). Diagnoses of Elevated lipase and Intractable vomiting with nausea, unspecified vomiting type were also pertinent to this visit.                           Desire Rosas MD  01/08/18  2128       Desire Rosas MD  01/08/18 2131       Desire Rosas MD  02/19/18 1904

## 2018-01-10 ENCOUNTER — ANESTHESIA EVENT (OUTPATIENT)
Dept: ENDOSCOPY | Facility: HOSPITAL | Age: 68
DRG: 304 | End: 2018-01-10
Payer: MEDICARE

## 2018-01-10 ENCOUNTER — ANESTHESIA (OUTPATIENT)
Dept: ENDOSCOPY | Facility: HOSPITAL | Age: 68
DRG: 304 | End: 2018-01-10
Payer: MEDICARE

## 2018-01-10 PROBLEM — I25.10 CORONARY ARTERY DISEASE INVOLVING NATIVE CORONARY ARTERY OF NATIVE HEART WITHOUT ANGINA PECTORIS: Status: ACTIVE | Noted: 2018-01-10

## 2018-01-10 PROBLEM — I50.22 CHRONIC SYSTOLIC HEART FAILURE: Status: ACTIVE | Noted: 2018-01-10

## 2018-01-10 PROBLEM — I21.4 NSTEMI (NON-ST ELEVATED MYOCARDIAL INFARCTION): Status: RESOLVED | Noted: 2018-01-08 | Resolved: 2018-01-10

## 2018-01-10 PROBLEM — R11.2 NAUSEA AND VOMITING: Status: ACTIVE | Noted: 2018-01-10

## 2018-01-10 PROBLEM — Z72.0 TOBACCO ABUSE: Status: ACTIVE | Noted: 2018-01-09

## 2018-01-10 LAB
ALBUMIN SERPL BCP-MCNC: 3.3 G/DL
ALP SERPL-CCNC: 149 U/L
ALT SERPL W/O P-5'-P-CCNC: 21 U/L
ANION GAP SERPL CALC-SCNC: 9 MMOL/L
AST SERPL-CCNC: 16 U/L
BASOPHILS # BLD AUTO: 0.01 K/UL
BASOPHILS NFR BLD: 0.1 %
BILIRUB SERPL-MCNC: 0.5 MG/DL
BUN SERPL-MCNC: 9 MG/DL
CALCIUM SERPL-MCNC: 9 MG/DL
CHLORIDE SERPL-SCNC: 107 MMOL/L
CHOLEST SERPL-MCNC: 162 MG/DL
CHOLEST/HDLC SERPL: 5.1 {RATIO}
CO2 SERPL-SCNC: 26 MMOL/L
CREAT SERPL-MCNC: 1 MG/DL
DIFFERENTIAL METHOD: ABNORMAL
EOSINOPHIL # BLD AUTO: 0 K/UL
EOSINOPHIL NFR BLD: 0 %
ERYTHROCYTE [DISTWIDTH] IN BLOOD BY AUTOMATED COUNT: 15.3 %
EST. GFR  (AFRICAN AMERICAN): >60 ML/MIN/1.73 M^2
EST. GFR  (NON AFRICAN AMERICAN): 58 ML/MIN/1.73 M^2
GLUCOSE SERPL-MCNC: 174 MG/DL
HCT VFR BLD AUTO: 36.4 %
HDLC SERPL-MCNC: 32 MG/DL
HDLC SERPL: 19.8 %
HGB BLD-MCNC: 12.2 G/DL
LDLC SERPL CALC-MCNC: 109.6 MG/DL
LYMPHOCYTES # BLD AUTO: 1.3 K/UL
LYMPHOCYTES NFR BLD: 7.7 %
MCH RBC QN AUTO: 27 PG
MCHC RBC AUTO-ENTMCNC: 33.5 G/DL
MCV RBC AUTO: 81 FL
MONOCYTES # BLD AUTO: 0.9 K/UL
MONOCYTES NFR BLD: 5.6 %
NEUTROPHILS # BLD AUTO: 14.4 K/UL
NEUTROPHILS NFR BLD: 86.6 %
NONHDLC SERPL-MCNC: 130 MG/DL
PLATELET # BLD AUTO: 394 K/UL
PMV BLD AUTO: 10.2 FL
POCT GLUCOSE: 175 MG/DL (ref 70–110)
POCT GLUCOSE: 184 MG/DL (ref 70–110)
POCT GLUCOSE: 185 MG/DL (ref 70–110)
POTASSIUM SERPL-SCNC: 3.6 MMOL/L
PROT SERPL-MCNC: 6.9 G/DL
RBC # BLD AUTO: 4.52 M/UL
SODIUM SERPL-SCNC: 142 MMOL/L
TRIGL SERPL-MCNC: 102 MG/DL
WBC # BLD AUTO: 16.6 K/UL

## 2018-01-10 PROCEDURE — 63600175 PHARM REV CODE 636 W HCPCS: Performed by: EMERGENCY MEDICINE

## 2018-01-10 PROCEDURE — 25000003 PHARM REV CODE 250: Performed by: NURSE ANESTHETIST, CERTIFIED REGISTERED

## 2018-01-10 PROCEDURE — 80061 LIPID PANEL: CPT

## 2018-01-10 PROCEDURE — 88305 TISSUE EXAM BY PATHOLOGIST: CPT | Performed by: PATHOLOGY

## 2018-01-10 PROCEDURE — C9113 INJ PANTOPRAZOLE SODIUM, VIA: HCPCS | Performed by: EMERGENCY MEDICINE

## 2018-01-10 PROCEDURE — 85025 COMPLETE CBC W/AUTO DIFF WBC: CPT

## 2018-01-10 PROCEDURE — 63600175 PHARM REV CODE 636 W HCPCS: Performed by: HOSPITALIST

## 2018-01-10 PROCEDURE — 25000003 PHARM REV CODE 250: Performed by: HOSPITALIST

## 2018-01-10 PROCEDURE — 25000003 PHARM REV CODE 250: Performed by: INTERNAL MEDICINE

## 2018-01-10 PROCEDURE — 37000009 HC ANESTHESIA EA ADD 15 MINS: Performed by: INTERNAL MEDICINE

## 2018-01-10 PROCEDURE — 80053 COMPREHEN METABOLIC PANEL: CPT

## 2018-01-10 PROCEDURE — 63600175 PHARM REV CODE 636 W HCPCS: Performed by: NURSE ANESTHETIST, CERTIFIED REGISTERED

## 2018-01-10 PROCEDURE — D9220A PRA ANESTHESIA: Mod: CRNA,,, | Performed by: NURSE ANESTHETIST, CERTIFIED REGISTERED

## 2018-01-10 PROCEDURE — 37000008 HC ANESTHESIA 1ST 15 MINUTES: Performed by: INTERNAL MEDICINE

## 2018-01-10 PROCEDURE — 27201012 HC FORCEPS, HOT/COLD, DISP: Performed by: INTERNAL MEDICINE

## 2018-01-10 PROCEDURE — 36415 COLL VENOUS BLD VENIPUNCTURE: CPT

## 2018-01-10 PROCEDURE — 21400001 HC TELEMETRY ROOM

## 2018-01-10 PROCEDURE — 0DB68ZX EXCISION OF STOMACH, VIA NATURAL OR ARTIFICIAL OPENING ENDOSCOPIC, DIAGNOSTIC: ICD-10-PCS | Performed by: INTERNAL MEDICINE

## 2018-01-10 PROCEDURE — 88305 TISSUE EXAM BY PATHOLOGIST: CPT | Mod: 26,,, | Performed by: PATHOLOGY

## 2018-01-10 PROCEDURE — 99233 SBSQ HOSP IP/OBS HIGH 50: CPT | Mod: ,,, | Performed by: INTERNAL MEDICINE

## 2018-01-10 PROCEDURE — 43239 EGD BIOPSY SINGLE/MULTIPLE: CPT | Performed by: INTERNAL MEDICINE

## 2018-01-10 PROCEDURE — 63600175 PHARM REV CODE 636 W HCPCS: Performed by: INTERNAL MEDICINE

## 2018-01-10 PROCEDURE — D9220A PRA ANESTHESIA: Mod: ANES,,, | Performed by: ANESTHESIOLOGY

## 2018-01-10 PROCEDURE — 25000003 PHARM REV CODE 250: Performed by: ANESTHESIOLOGY

## 2018-01-10 RX ORDER — PROPOFOL 10 MG/ML
VIAL (ML) INTRAVENOUS
Status: DISCONTINUED | OUTPATIENT
Start: 2018-01-10 | End: 2018-01-10

## 2018-01-10 RX ORDER — SODIUM CHLORIDE 9 MG/ML
INJECTION, SOLUTION INTRAVENOUS CONTINUOUS PRN
Status: DISCONTINUED | OUTPATIENT
Start: 2018-01-10 | End: 2018-01-10

## 2018-01-10 RX ORDER — ONDANSETRON 2 MG/ML
INJECTION INTRAMUSCULAR; INTRAVENOUS
Status: COMPLETED
Start: 2018-01-10 | End: 2018-01-10

## 2018-01-10 RX ORDER — SPIRONOLACTONE 25 MG/1
25 TABLET ORAL DAILY
Status: DISCONTINUED | OUTPATIENT
Start: 2018-01-10 | End: 2018-01-10

## 2018-01-10 RX ORDER — SODIUM CHLORIDE 9 MG/ML
INJECTION, SOLUTION INTRAVENOUS ONCE
Status: COMPLETED | OUTPATIENT
Start: 2018-01-10 | End: 2018-01-10

## 2018-01-10 RX ORDER — ONDANSETRON 2 MG/ML
INJECTION INTRAMUSCULAR; INTRAVENOUS
Status: DISCONTINUED | OUTPATIENT
Start: 2018-01-10 | End: 2018-01-10

## 2018-01-10 RX ORDER — MIDAZOLAM HYDROCHLORIDE 1 MG/ML
INJECTION INTRAMUSCULAR; INTRAVENOUS
Status: COMPLETED
Start: 2018-01-10 | End: 2018-01-10

## 2018-01-10 RX ORDER — CARVEDILOL 6.25 MG/1
12.5 TABLET ORAL 2 TIMES DAILY
Status: DISCONTINUED | OUTPATIENT
Start: 2018-01-10 | End: 2018-01-10

## 2018-01-10 RX ORDER — LIDOCAINE HCL/PF 100 MG/5ML
SYRINGE (ML) INTRAVENOUS
Status: DISCONTINUED | OUTPATIENT
Start: 2018-01-10 | End: 2018-01-10

## 2018-01-10 RX ORDER — PROPOFOL 10 MG/ML
VIAL (ML) INTRAVENOUS
Status: COMPLETED
Start: 2018-01-10 | End: 2018-01-10

## 2018-01-10 RX ORDER — MIDAZOLAM HYDROCHLORIDE 1 MG/ML
INJECTION, SOLUTION INTRAMUSCULAR; INTRAVENOUS
Status: DISCONTINUED | OUTPATIENT
Start: 2018-01-10 | End: 2018-01-10

## 2018-01-10 RX ORDER — CARVEDILOL 6.25 MG/1
25 TABLET ORAL 2 TIMES DAILY
Status: DISCONTINUED | OUTPATIENT
Start: 2018-01-10 | End: 2018-01-11 | Stop reason: HOSPADM

## 2018-01-10 RX ORDER — LIDOCAINE HYDROCHLORIDE 20 MG/ML
INJECTION, SOLUTION EPIDURAL; INFILTRATION; INTRACAUDAL; PERINEURAL
Status: DISPENSED
Start: 2018-01-10 | End: 2018-01-10

## 2018-01-10 RX ADMIN — PANTOPRAZOLE SODIUM 40 MG: 40 INJECTION, POWDER, FOR SOLUTION INTRAVENOUS at 11:01

## 2018-01-10 RX ADMIN — SODIUM CHLORIDE: 0.9 INJECTION, SOLUTION INTRAVENOUS at 09:01

## 2018-01-10 RX ADMIN — METOPROLOL TARTRATE 5 MG: 5 INJECTION INTRAVENOUS at 08:01

## 2018-01-10 RX ADMIN — CARVEDILOL 25 MG: 6.25 TABLET, FILM COATED ORAL at 08:01

## 2018-01-10 RX ADMIN — PROCHLORPERAZINE EDISYLATE 2.5 MG: 5 INJECTION INTRAMUSCULAR; INTRAVENOUS at 01:01

## 2018-01-10 RX ADMIN — PROCHLORPERAZINE EDISYLATE 2.5 MG: 5 INJECTION INTRAMUSCULAR; INTRAVENOUS at 02:01

## 2018-01-10 RX ADMIN — ONDANSETRON 4 MG: 2 INJECTION, SOLUTION INTRAMUSCULAR; INTRAVENOUS at 09:01

## 2018-01-10 RX ADMIN — LIDOCAINE HYDROCHLORIDE 50 MG: 20 INJECTION, SOLUTION INTRAVENOUS at 09:01

## 2018-01-10 RX ADMIN — LISINOPRIL 40 MG: 20 TABLET ORAL at 11:01

## 2018-01-10 RX ADMIN — HYDRALAZINE HYDROCHLORIDE 10 MG: 20 INJECTION INTRAMUSCULAR; INTRAVENOUS at 11:01

## 2018-01-10 RX ADMIN — SODIUM CHLORIDE, SODIUM LACTATE, POTASSIUM CHLORIDE, AND CALCIUM CHLORIDE: 600; 310; 30; 20 INJECTION, SOLUTION INTRAVENOUS at 04:01

## 2018-01-10 RX ADMIN — HYDRALAZINE HYDROCHLORIDE 10 MG: 20 INJECTION INTRAMUSCULAR; INTRAVENOUS at 12:01

## 2018-01-10 RX ADMIN — MIDAZOLAM HYDROCHLORIDE 1 MG: 1 INJECTION, SOLUTION INTRAMUSCULAR; INTRAVENOUS at 09:01

## 2018-01-10 RX ADMIN — LIDOCAINE HYDROCHLORIDE 100 MG: 20 INJECTION, SOLUTION INTRAVENOUS at 09:01

## 2018-01-10 RX ADMIN — ONDANSETRON 8 MG: 2 INJECTION INTRAMUSCULAR; INTRAVENOUS at 12:01

## 2018-01-10 RX ADMIN — SERTRALINE HYDROCHLORIDE 100 MG: 50 TABLET ORAL at 08:01

## 2018-01-10 RX ADMIN — ASPIRIN 81 MG: 81 TABLET, COATED ORAL at 11:01

## 2018-01-10 RX ADMIN — PROPOFOL 150 MG: 10 INJECTION, EMULSION INTRAVENOUS at 09:01

## 2018-01-10 RX ADMIN — INSULIN ASPART 2 UNITS: 100 INJECTION, SOLUTION INTRAVENOUS; SUBCUTANEOUS at 06:01

## 2018-01-10 NOTE — NURSING
Patient returned to room via wheelchair by transport. Patient transferred to bed without difficulty. Patient has no complaints. Bed in lowest position, call light within reach. Will continue to monitor.

## 2018-01-10 NOTE — NURSING
Report received; pt awake and alert resp even and unlabored no acute distress noted; denies pain/discomfort; no needs voiced

## 2018-01-10 NOTE — ASSESSMENT & PLAN NOTE
Unknown etiology as it has persistent with resolution of pancreatitis, which was not severe and without acute pathology on CT abdomen with contrast.. Is to undergo EGD today. Appreciate further GI recs.

## 2018-01-10 NOTE — ASSESSMENT & PLAN NOTE
Nuclear stress test abnormal with fixed apical defect possibly 2/2 to hypertensive emergency rather than true NSTEMI. Has EF of 35%, new? On secondary prevention with ASA, BB and ACE-I. Needs better BP control. Will add lipid panel. Statin when cleared by GI

## 2018-01-10 NOTE — SUBJECTIVE & OBJECTIVE
"Past Medical History:   Diagnosis Date    Anxiety     Arthritis     Asthma     Bronchiectasis with acute exacerbation     Bronchitis     Chronic pain     right knee    Essential hypertension, benign 11/21/2012    Hypotension, iatrogenic     Insomnia     Knee pain, right     chronic    Mitral valve prolapse     Obstructive chronic bronchitis without exacerbation 11/21/2012    Type II or unspecified type diabetes mellitus without mention of complication, uncontrolled     Type II or unspecified type diabetes mellitus without mention of complication, uncontrolled        Past Surgical History:   Procedure Laterality Date    bowel reconstruction      BREAST SURGERY      CHOLECYSTECTOMY      HYSTERECTOMY      KNEE SURGERY      R, surgeries x 7    Right Leg reconstruction surgery      Rotator Cuff Surgery      TONSILLECTOMY      WRIST SURGERY         Review of patient's allergies indicates:  No Known Allergies    No current facility-administered medications on file prior to encounter.      Current Outpatient Prescriptions on File Prior to Encounter   Medication Sig    ADVAIR DISKUS 250-50 mcg/dose diskus inhaler     bumetanide (BUMEX) 2 MG tablet     diclofenac sodium (VOLTAREN) 1 % Gel Apply 2 g topically 4 (four) times daily.    gabapentin (NEURONTIN) 600 MG tablet     LEVEMIR FLEXPEN 100 unit/mL (3 mL) InPn     lisinopril 10 MG tablet     NOVOLOG FLEXPEN 100 unit/mL InPn     potassium chloride (MICRO-K) 10 MEQ CpSR Take 10 mEq by mouth once daily.      quetiapine (SEROQUEL) 100 MG Tab Take by mouth every evening.    sertraline (ZOLOFT) 100 MG tablet     SURE COMFORT PEN NEEDLE 31 x 3/16 " Ndle     trazodone (DESYREL) 100 MG tablet Take 100 mg by mouth every evening.     Family History     Problem Relation (Age of Onset)    Heart disease Sister, Brother    Hypertension Mother        Social History Main Topics    Smoking status: Current Every Day Smoker     Packs/day: 0.25     Years: " 45.00     Types: Cigarettes    Smokeless tobacco: Never Used    Alcohol use No    Drug use: Yes     Types: Marijuana    Sexual activity: Not Currently     Partners: Male     Birth control/ protection: None     Review of Systems   Gastrointestinal: Negative for melena.   Genitourinary: Negative for hematuria.     Objective:     Vital Signs (Most Recent):  Temp: 98.7 °F (37.1 °C) (01/10/18 0506)  Pulse: (!) 112 (01/10/18 0506)  Resp: 19 (01/10/18 0016)  BP: (!) 177/86 (01/10/18 0506)  SpO2: (!) 94 % (01/10/18 0506) Vital Signs (24h Range):  Temp:  [98 °F (36.7 °C)-99.2 °F (37.3 °C)] 98.7 °F (37.1 °C)  Pulse:  [] 112  Resp:  [18-21] 19  SpO2:  [92 %-98 %] 94 %  BP: (170-189)/(76-97) 177/86     Weight: 98 kg (216 lb 0.8 oz)  Body mass index is 42.19 kg/m².    SpO2: (!) 94 %  O2 Device (Oxygen Therapy): room air      Intake/Output Summary (Last 24 hours) at 01/10/18 0656  Last data filed at 01/09/18 1730   Gross per 24 hour   Intake              320 ml   Output                0 ml   Net              320 ml       Lines/Drains/Airways     Peripheral Intravenous Line                 Peripheral IV - Single Lumen 01/08/18 1852 Right Hand 1 day                Physical Exam   Constitutional: She is oriented to person, place, and time. She appears well-developed and well-nourished. No distress.   HENT:   Head: Normocephalic and atraumatic.   Mouth/Throat: No oropharyngeal exudate.   Eyes: Conjunctivae and EOM are normal. Pupils are equal, round, and reactive to light. No scleral icterus.   Neck: Normal range of motion. Neck supple. No JVD present. No tracheal deviation present.   Cardiovascular: Normal rate, regular rhythm, S1 normal and S2 normal.  Exam reveals no gallop and no friction rub.    No murmur heard.  Pulmonary/Chest: Effort normal and breath sounds normal. No respiratory distress. She has no wheezes.   Abdominal: Soft. Bowel sounds are normal. There is no tenderness.   obese   Musculoskeletal: Normal range  of motion. She exhibits no edema.   Neurological: She is alert and oriented to person, place, and time. No cranial nerve deficit.   Skin: Skin is warm and dry. She is not diaphoretic.   Psychiatric: She has a normal mood and affect. Her behavior is normal. Judgment normal.       Current Medications:   aspirin  81 mg Oral Daily    clopidogrel  75 mg Oral Daily    fluticasone-vilanterol  1 puff Inhalation Daily    lisinopril  40 mg Oral Daily    metoprolol tartrate  25 mg Oral Daily    pantoprazole  40 mg Intravenous Daily    sertraline  100 mg Oral QHS       acetaminophen, acetaminophen, dextrose 50%, glucagon (human recombinant), hydrALAZINE, influenza, insulin aspart, metoprolol, ondansetron, pneumoc 13-amos conj-dip cr(PF), prochlorperazine    Laboratory:  CBC:    Recent Labs  Lab 10/25/16  0314 01/08/18  1628 01/09/18  0659   WHITE BLOOD CELL COUNT 14.56 H 13.71 H 15.00 H   HEMOGLOBIN 12.2 12.9 12.6   HEMATOCRIT 37.8 40.3 38.4   PLATELETS 431 H 432 H 428 H       CHEMISTRIES:    Recent Labs  Lab 10/25/16  0314 01/08/18  1628 01/09/18  0659   GLUCOSE 164 H 146 H 165 H   SODIUM 140 146 H 147 H   POTASSIUM 5.1 4.1 3.4 L   BUN BLD 26 H 9 10   CREATININE 1.2 1.1 1.1   EGFR IF  54 A 60 60   EGFR IF NON- 47 A 52 A 52 A   CALCIUM 9.8 10.0 9.6       CARDIAC BIOMARKERS:    Recent Labs  Lab 01/08/18  1854 01/09/18  0037 01/09/18  0600   TROPONIN I 0.198 H 0.191 H 0.175 H       COAGS:    Recent Labs  Lab 10/25/16  0314   INR 1.0       LIPIDS/LFTS:    Recent Labs  Lab 10/25/16  0314 01/08/18  1628 01/09/18  0659   AST 40 13 17   ALT 59 H 21 21     Lab Results   Component Value Date    LIPASE 7 01/09/2018     BNP    Recent Labs  Lab 01/08/18  1854   *           Diagnostic Results:  ECG (personally reviewed tracings):   1/8/18 2036 SR 96, PVC, PRWP, inflat st abnl ?isch    CT abd 1/8/18  1.  Intrahepatic and extrahepatic biliary ductal dilatation of unclear etiology.  Gallbladder is  not seen and likely has been removed.  Further evaluation may be warranted with MRCP or ERCP.  2.  No CT evidence to suggest acute pancreatitis as clinically questioned.  3.  Colonic diverticulosis with no evidence of acute diverticulitis.  4.  Additional findings as detailed above.    Edith MPI 1/9/18 (images pers rev)  Nuclear Quantitative Functional Analysis:   LVEF: 42 %  LVED Volume: 122 ml  LVES Volume: 71 ml  Impression: ABNORMAL MYOCARDIAL PERFUSION  1. There is a small size fixed defect of mild intensity in the apical wall of the left ventricle, consistent with myocardial injury. There is trivial mode-injury ischemia.   2. There is abnormal wall motion at rest showing mild global hypokinesis of the left ventricle.   3. There is resting LV dysfunction with a reduced ejection fraction of 42 %.   4. The ventricular volumes are normal at rest and stress.   5. The extracardiac distribution of radioactivity is normal.      Echo 1/9/18 (images pers rev)    1 - Moderately depressed left ventricular systolic function (EF 35-40%).  Moderate global hypokinesis..     2 - Impaired LV relaxation, normal LAP (grade 1 diastolic dysfunction).     3 - Eccentric hypertrophy.     4 - Mild left ventricular enlargement.     5 - Trivial tricuspid regurgitation.     6 - The estimated PA systolic pressure is 36 mmHg.

## 2018-01-10 NOTE — CARE UPDATE
Interval Progress Note    I agree with my colleague's assessment and plan at time of admission. I personally examined Ms Land today. She is nauseous and had emesis but otherwise non toxic appearing. Lipase back to normal with bowel rest and IVFs. Underwent nuclear stress test with mild ischemia of myocardium and EF of 35-40% which I believe is new. Cards cleared for GI endoscopy scheduled for tomorrow. Will follow up in AM.

## 2018-01-10 NOTE — PROGRESS NOTES
[facility] Hospital Medicine  Progress Note    Patient Name: Raissa Land  MRN: [id number]  Patient Class: IP- Inpatient   Admission Date: 1/8/2018  Length of Stay: 2 days  Attending Physician: Sydni Deras MD  Primary Care Provider: Guerrero Hensley Jr, MD        Subjective:     Principal Problem:Hypertensive emergency    HPI:  Raissa Land is a 67 y.o. female that (in part)  has a past medical history of Anxiety; Arthritis; Asthma; Bronchiectasis with acute exacerbation; Bronchitis; Chronic pain; Essential hypertension, benign; Hypotension, iatrogenic; Insomnia; Knee pain, right; Mitral valve prolapse; Obstructive chronic bronchitis without exacerbation; Type II or unspecified type diabetes mellitus without mention of complication, uncontrolled; and Type II or unspecified type diabetes mellitus without mention of complication, uncontrolled. Presents to [facility] Emergency Department  presents with several hour duration of epigastric pain. Patient reports no history of previous pancreatitis that has required hospitalization.  Patient denies having any alcoholic drinks. Patient is experiencing persistent, deep, aching abdominal pain and points to the epigastric area when asked where the most severe pain is located. Patient admits to having associated symptoms including nausea with vomiting, but denies fever and chills, and has not been able to eat or drink since onset of abdominal pain.  Pain is exacerbated by palpation of the epigastrium as well as oral intake.  Relieved with pain medication given in the ER. Complains that the pain is severe, progressive, and worsening.    She has had previous pancreatic surgery.  Reports associated symptoms of constipation no bowel movements in 3 days.  Also reportedly febrile 100.6°F.  Initially she was hypertensive with a blood pressure of 220 and was minimally responsive to initial dose of labetalol.  She was also found to have  leukocytosis as well as anemia with an anion gap of 16.  Her influenza screen was negative.  CT abdomen pelvis was also obtained but did not show any abnormalities.  She has had low urine output and was given IV fluids in the ED and we are pending urinalysis.  General surgery has been consulted.      In the emergency department routine labs including lipase were obtained along with abdominal imaging.  Findings were consistent with acute pancreatitis.  She also had evidence of new T-wave inversions in anterolateral leads as well as elevated troponin level.  There is concern she was having hypertensive emergency due to a systolic blood pressure of ~220mmHg.     The patient is being admitted to Hospital medicine service for IV fluids, bowel rest, and serial monitoring of pancreatic enzymes in addition to further evaluation and treatment as outlined below in detail.  GI consultation if symptoms do not improve within a reasonable amount of time.      Hospital Course:  No notes on file    Interval History: still with nausea and dry heaving. Nuclear stress test with mild ischemia and wall motion abnormalities. No further intervention. Is to go for EGD today. No chest pain    Review of Systems   Constitutional: Negative.    Respiratory: Negative.    Cardiovascular: Negative.    Gastrointestinal: Positive for nausea. Negative for vomiting.   Genitourinary: Negative.    Musculoskeletal: Negative.    Neurological: Negative.    Hematological: Negative.    Psychiatric/Behavioral: Negative.      Objective:     Vital Signs (Most Recent):  Temp: 98.6 °F (37 °C) (01/10/18 0717)  Pulse: 109 (01/10/18 0717)  Resp: 16 (01/10/18 0717)  BP: (!) 143/63 (01/10/18 0717)  SpO2: 95 % (01/10/18 0717) Vital Signs (24h Range):  Temp:  [98 °F (36.7 °C)-99.2 °F (37.3 °C)] 98.6 °F (37 °C)  Pulse:  [] 109  Resp:  [16-21] 16  SpO2:  [92 %-97 %] 95 %  BP: (143-189)/(63-97) 143/63     Weight: 98 kg (216 lb 0.8 oz)  Body mass index is 42.19  kg/m².    Intake/Output Summary (Last 24 hours) at 01/10/18 0842  Last data filed at 01/09/18 1730   Gross per 24 hour   Intake              320 ml   Output                0 ml   Net              320 ml      Physical Exam   Constitutional: She is oriented to person, place, and time. She appears well-developed. No distress.   Cardiovascular: Normal rate, regular rhythm, normal heart sounds and intact distal pulses.    Pulmonary/Chest: Effort normal and breath sounds normal.   Abdominal: Soft. Bowel sounds are normal. She exhibits no distension. There is no tenderness. There is no guarding.   Musculoskeletal: Normal range of motion. She exhibits edema (+1).   Neurological: She is alert and oriented to person, place, and time.   Skin: Skin is warm and dry. She is not diaphoretic.   Psychiatric: She has a normal mood and affect. Her behavior is normal. Judgment and thought content normal.   Nursing note and vitals reviewed.      Significant Labs: All pertinent labs within the past 24 hours have been reviewed.    Significant Imaging: I have reviewed all pertinent imaging results/findings within the past 24 hours.  I have reviewed and interpreted all pertinent imaging results/findings within the past 24 hours.    Assessment/Plan:      * Hypertensive emergency    As evidenced by elevated troponin, n/v and elevated lipase. BP now better controlled but still not at goal. Adjust antihypertensive therapy for goal < 130/90 mmHg. Smoking cessation and weight loss. Low sodium diet. Treatment for n/v        Nausea and vomiting    Unknown etiology as it has persistent with resolution of pancreatitis, which was not severe and without acute pathology on CT abdomen with contrast.. Is to undergo EGD today. Appreciate further GI recs.           Coronary artery disease involving native coronary artery of native heart without angina pectoris    Nuclear stress test abnormal with fixed apical defect possibly 2/2 to hypertensive emergency  rather than true NSTEMI. Has EF of 35%, new? On secondary prevention with ASA, BB and ACE-I. Needs better BP control. Will add lipid panel. Statin when cleared by GI          Chronic systolic heart failure    Temporarily hold diuretic for hypernatremia.           Acute pancreatitis    Quickly resolved with fluids only. Is s/p cholecystectomy. Has dilation of CBD (18 mm diameter) without evident etiology (no stone, etc) and unknown significance. Labs do not show cholestatic pattern. May need an MRCP/ERCP for further evaluation. Is to go for EGD today. Appreciate further GI recs.             Dehydration with hypernatremia      Dehydration secondary to nausea, vomiting, diarrhea.  Likely viral gastroenteritis.  Leukocytosis without granulocytosis.  Providing IV fluids.  Watching sodium levels closely.        Tobacco abuse    Chronic tobacco use  Tobacco cessation counseling  Nicotine patch contraindicated with possible ACS; consider Wellbutrin or Chantix through PCP as an outpatient (will require closer monitoring)          DM type 2 (diabetes mellitus, type 2)    BG at goal. Continue current regimen  HgA1c = 7%        Chronic pain of multiple sites    Supportive care.  No acute issues        Class 3 severe obesity due to excess calories with serious comorbidity in adult    Body mass index is 42.19 kg/m².   Counseling given  Nutrition consult as an outpatient              Essential hypertension, benign    Blood pressure tends to elevate with dry heaving and nausea. Did present with hypertensive crisis. Management as above.               VTE Risk Mitigation         Ordered     Medium Risk of VTE  Once      01/09/18 0132     Place sequential compression device  Until discontinued      01/09/18 0132     Place GENARO hose  Until discontinued      01/09/18 0132              Sydni Reyes MD  Department of Hospital Medicine   [facility]

## 2018-01-10 NOTE — PROGRESS NOTES
[facility]  Cardiology  Progress Note    Patient Name: Raissa Land  MRN: [id number]  Admission Date: 1/8/2018  Hospital Length of Stay: 2 days  Code Status: Full Code   Attending Physician: Sydni Deras MD   Primary Care Physician: Guerrero Hensley Jr, MD  Expected Discharge Date:   Principal Problem:Hypertensive emergency    Subjective:     Interval Hx: still complaining of dry heaving/nausea.  No CP/SOB.    Tele: SR, PVCs (pers rev)      Past Medical History:   Diagnosis Date    Anxiety     Arthritis     Asthma     Bronchiectasis with acute exacerbation     Bronchitis     Chronic pain     right knee    Essential hypertension, benign 11/21/2012    Hypotension, iatrogenic     Insomnia     Knee pain, right     chronic    Mitral valve prolapse     Obstructive chronic bronchitis without exacerbation 11/21/2012    Type II or unspecified type diabetes mellitus without mention of complication, uncontrolled     Type II or unspecified type diabetes mellitus without mention of complication, uncontrolled        Past Surgical History:   Procedure Laterality Date    bowel reconstruction      BREAST SURGERY      CHOLECYSTECTOMY      HYSTERECTOMY      KNEE SURGERY      R, surgeries x 7    Right Leg reconstruction surgery      Rotator Cuff Surgery      TONSILLECTOMY      WRIST SURGERY         Review of patient's allergies indicates:  No Known Allergies    No current facility-administered medications on file prior to encounter.      Current Outpatient Prescriptions on File Prior to Encounter   Medication Sig    ADVAIR DISKUS 250-50 mcg/dose diskus inhaler     bumetanide (BUMEX) 2 MG tablet     diclofenac sodium (VOLTAREN) 1 % Gel Apply 2 g topically 4 (four) times daily.    gabapentin (NEURONTIN) 600 MG tablet     LEVEMIR FLEXPEN 100 unit/mL (3 mL) InPn     lisinopril 10 MG tablet     NOVOLOG FLEXPEN 100 unit/mL InPn     potassium chloride (MICRO-K) 10 MEQ CpSR Take 10 mEq by mouth  "once daily.      quetiapine (SEROQUEL) 100 MG Tab Take by mouth every evening.    sertraline (ZOLOFT) 100 MG tablet     SURE COMFORT PEN NEEDLE 31 x 3/16 " Ndle     trazodone (DESYREL) 100 MG tablet Take 100 mg by mouth every evening.     Family History     Problem Relation (Age of Onset)    Heart disease Sister, Brother    Hypertension Mother        Social History Main Topics    Smoking status: Current Every Day Smoker     Packs/day: 0.25     Years: 45.00     Types: Cigarettes    Smokeless tobacco: Never Used    Alcohol use No    Drug use: Yes     Types: Marijuana    Sexual activity: Not Currently     Partners: Male     Birth control/ protection: None     Review of Systems   Gastrointestinal: Negative for melena.   Genitourinary: Negative for hematuria.     Objective:     Vital Signs (Most Recent):  Temp: 98.7 °F (37.1 °C) (01/10/18 0506)  Pulse: (!) 112 (01/10/18 0506)  Resp: 19 (01/10/18 0016)  BP: (!) 177/86 (01/10/18 0506)  SpO2: (!) 94 % (01/10/18 0506) Vital Signs (24h Range):  Temp:  [98 °F (36.7 °C)-99.2 °F (37.3 °C)] 98.7 °F (37.1 °C)  Pulse:  [] 112  Resp:  [18-21] 19  SpO2:  [92 %-98 %] 94 %  BP: (170-189)/(76-97) 177/86     Weight: 98 kg (216 lb 0.8 oz)  Body mass index is 42.19 kg/m².    SpO2: (!) 94 %  O2 Device (Oxygen Therapy): room air      Intake/Output Summary (Last 24 hours) at 01/10/18 0656  Last data filed at 01/09/18 1730   Gross per 24 hour   Intake              320 ml   Output                0 ml   Net              320 ml       Lines/Drains/Airways     Peripheral Intravenous Line                 Peripheral IV - Single Lumen 01/08/18 1852 Right Hand 1 day                Physical Exam   Constitutional: She is oriented to person, place, and time. She appears well-developed and well-nourished. No distress.   HENT:   Head: Normocephalic and atraumatic.   Mouth/Throat: No oropharyngeal exudate.   Eyes: Conjunctivae and EOM are normal. Pupils are equal, round, and reactive to light. " No scleral icterus.   Neck: Normal range of motion. Neck supple. No JVD present. No tracheal deviation present.   Cardiovascular: Normal rate, regular rhythm, S1 normal and S2 normal.  Exam reveals no gallop and no friction rub.    No murmur heard.  Pulmonary/Chest: Effort normal and breath sounds normal. No respiratory distress. She has no wheezes.   Abdominal: Soft. Bowel sounds are normal. There is no tenderness.   obese   Musculoskeletal: Normal range of motion. She exhibits no edema.   Neurological: She is alert and oriented to person, place, and time. No cranial nerve deficit.   Skin: Skin is warm and dry. She is not diaphoretic.   Psychiatric: She has a normal mood and affect. Her behavior is normal. Judgment normal.       Current Medications:   aspirin  81 mg Oral Daily    clopidogrel  75 mg Oral Daily    fluticasone-vilanterol  1 puff Inhalation Daily    lisinopril  40 mg Oral Daily    metoprolol tartrate  25 mg Oral Daily    pantoprazole  40 mg Intravenous Daily    sertraline  100 mg Oral QHS       acetaminophen, acetaminophen, dextrose 50%, glucagon (human recombinant), hydrALAZINE, influenza, insulin aspart, metoprolol, ondansetron, pneumoc 13-amos conj-dip cr(PF), prochlorperazine    Laboratory:  CBC:    Recent Labs  Lab 10/25/16  0314 01/08/18  1628 01/09/18  0659   WHITE BLOOD CELL COUNT 14.56 H 13.71 H 15.00 H   HEMOGLOBIN 12.2 12.9 12.6   HEMATOCRIT 37.8 40.3 38.4   PLATELETS 431 H 432 H 428 H       CHEMISTRIES:    Recent Labs  Lab 10/25/16  0314 01/08/18  1628 01/09/18  0659   GLUCOSE 164 H 146 H 165 H   SODIUM 140 146 H 147 H   POTASSIUM 5.1 4.1 3.4 L   BUN BLD 26 H 9 10   CREATININE 1.2 1.1 1.1   EGFR IF  54 A 60 60   EGFR IF NON- 47 A 52 A 52 A   CALCIUM 9.8 10.0 9.6       CARDIAC BIOMARKERS:    Recent Labs  Lab 01/08/18  1854 01/09/18  0037 01/09/18  0600   TROPONIN I 0.198 H 0.191 H 0.175 H       COAGS:    Recent Labs  Lab 10/25/16  0314   INR 1.0        LIPIDS/LFTS:    Recent Labs  Lab 10/25/16  0314 01/08/18  1628 01/09/18  0659   AST 40 13 17   ALT 59 H 21 21     Lab Results   Component Value Date    LIPASE 7 01/09/2018     BNP    Recent Labs  Lab 01/08/18  1854   *           Diagnostic Results:  ECG (personally reviewed tracings):   1/8/18 2036 SR 96, PVC, PRWP, inflat st abnl ?isch    CT abd 1/8/18  1.  Intrahepatic and extrahepatic biliary ductal dilatation of unclear etiology.  Gallbladder is not seen and likely has been removed.  Further evaluation may be warranted with MRCP or ERCP.  2.  No CT evidence to suggest acute pancreatitis as clinically questioned.  3.  Colonic diverticulosis with no evidence of acute diverticulitis.  4.  Additional findings as detailed above.    Edith MPI 1/9/18 (images pers rev)  Nuclear Quantitative Functional Analysis:   LVEF: 42 %  LVED Volume: 122 ml  LVES Volume: 71 ml  Impression: ABNORMAL MYOCARDIAL PERFUSION  1. There is a small size fixed defect of mild intensity in the apical wall of the left ventricle, consistent with myocardial injury. There is trivial mode-injury ischemia.   2. There is abnormal wall motion at rest showing mild global hypokinesis of the left ventricle.   3. There is resting LV dysfunction with a reduced ejection fraction of 42 %.   4. The ventricular volumes are normal at rest and stress.   5. The extracardiac distribution of radioactivity is normal.      Echo 1/9/18 (images pers rev)    1 - Moderately depressed left ventricular systolic function (EF 35-40%).  Moderate global hypokinesis..     2 - Impaired LV relaxation, normal LAP (grade 1 diastolic dysfunction).     3 - Eccentric hypertrophy.     4 - Mild left ventricular enlargement.     5 - Trivial tricuspid regurgitation.     6 - The estimated PA systolic pressure is 36 mmHg.       Assessment and Plan:     * Hypertensive emergency    ?r/t GI process  Note made of elev trop/flat pattern without angina/dyspnea, likely demand  related  Cont med rx HTN  MPI with fixed apical defect and echo EF 35%, ?hypertensive CMP.  No further CV testing planned  Change metoprolol to coreg 12.5mg bid  Add aldactone 25mg qd  Cont lisinopril/ASA  Stop Plavix  Check lipids and add statin when GI eval complete.          DM type 2 (diabetes mellitus, type 2)    Per IM        Acute pancreatitis    Lipase normalized  GI eval pending            VTE Risk Mitigation         Ordered     Medium Risk of VTE  Once      01/09/18 0132     Place sequential compression device  Until discontinued      01/09/18 0132     Place GENARO hose  Until discontinued      01/09/18 0132          Daniel Arevalo MD  Cardiology  [facility]

## 2018-01-10 NOTE — TRANSFER OF CARE
Anesthesia Transfer of Care Note    Patient: Raissa Land    Procedure(s) Performed: Procedure(s) (LRB):  ESOPHAGOGASTRODUODENOSCOPY (EGD) (Left)    Patient location: GI    Anesthesia Type: MAC    Transport from OR: Transported from OR on 2-3 L/min O2 by NC with adequate spontaneous ventilation    Post pain: adequate analgesia    Post assessment: no apparent anesthetic complications    Post vital signs: stable    Level of consciousness: awake and alert    Nausea/Vomiting: no nausea/vomiting    Complications: none    Transfer of care protocol was followed      Last vitals:   Visit Vitals  BP (!) 150/68   Pulse 92   Temp (!) 2.2 °C (36 °F) (Skin)   Resp 16   Ht 5' (1.524 m)   Wt 98 kg (216 lb 0.8 oz)   SpO2 (!) 94%   Breastfeeding? No   BMI 42.19 kg/m²

## 2018-01-10 NOTE — ASSESSMENT & PLAN NOTE
Quickly resolved with fluids only. Is s/p cholecystectomy. Has dilation of CBD (18 mm diameter) without evident etiology (no stone, etc) and unknown significance. Labs do not show cholestatic pattern. May need an MRCP/ERCP for further evaluation. Is to go for EGD today. Appreciate further GI recs.

## 2018-01-10 NOTE — PROGRESS NOTES
The patient has been examined and the H&P has been reviewed:  I concur with the findings and no changes have occurred since H&P was written.      Anesthesia/Surgery risks, benefits and alternative options discussed and understood by patient/family.    The patient's stress test was reviewed with Dr. Arevalo, who believes she is low risk for an EGD.

## 2018-01-10 NOTE — ASSESSMENT & PLAN NOTE
Chronic tobacco use  Tobacco cessation counseling  Nicotine patch contraindicated with possible ACS; consider Wellbutrin or Chantix through PCP as an outpatient (will require closer monitoring)

## 2018-01-10 NOTE — ASSESSMENT & PLAN NOTE
Blood pressure tends to elevate with dry heaving and nausea. Did present with hypertensive crisis. Management as above.

## 2018-01-10 NOTE — ASSESSMENT & PLAN NOTE
As evidenced by elevated troponin, n/v and elevated lipase. BP now better controlled but still not at goal. Adjust antihypertensive therapy for goal < 130/90 mmHg. Smoking cessation and weight loss. Low sodium diet. Treatment for n/v

## 2018-01-11 VITALS
DIASTOLIC BLOOD PRESSURE: 78 MMHG | BODY MASS INDEX: 42.42 KG/M2 | HEIGHT: 60 IN | HEART RATE: 88 BPM | OXYGEN SATURATION: 94 % | SYSTOLIC BLOOD PRESSURE: 148 MMHG | WEIGHT: 216.06 LBS | TEMPERATURE: 98 F | RESPIRATION RATE: 18 BRPM

## 2018-01-11 PROBLEM — R79.89 ELEVATED TROPONIN: Status: ACTIVE | Noted: 2018-01-11

## 2018-01-11 LAB
ALBUMIN SERPL BCP-MCNC: 3.2 G/DL
ALP SERPL-CCNC: 136 U/L
ALT SERPL W/O P-5'-P-CCNC: 21 U/L
ANION GAP SERPL CALC-SCNC: 10 MMOL/L
AST SERPL-CCNC: 15 U/L
BASOPHILS # BLD AUTO: 0.01 K/UL
BASOPHILS NFR BLD: 0.1 %
BILIRUB SERPL-MCNC: 0.6 MG/DL
BUN SERPL-MCNC: 9 MG/DL
CALCIUM SERPL-MCNC: 9 MG/DL
CHLORIDE SERPL-SCNC: 109 MMOL/L
CO2 SERPL-SCNC: 24 MMOL/L
CREAT SERPL-MCNC: 0.9 MG/DL
DIFFERENTIAL METHOD: ABNORMAL
EOSINOPHIL # BLD AUTO: 0 K/UL
EOSINOPHIL NFR BLD: 0.1 %
ERYTHROCYTE [DISTWIDTH] IN BLOOD BY AUTOMATED COUNT: 15.5 %
EST. GFR  (AFRICAN AMERICAN): >60 ML/MIN/1.73 M^2
EST. GFR  (NON AFRICAN AMERICAN): >60 ML/MIN/1.73 M^2
GLUCOSE SERPL-MCNC: 154 MG/DL
HCT VFR BLD AUTO: 33.9 %
HGB BLD-MCNC: 11.1 G/DL
LYMPHOCYTES # BLD AUTO: 1.3 K/UL
LYMPHOCYTES NFR BLD: 8.1 %
MAGNESIUM SERPL-MCNC: 1.9 MG/DL
MCH RBC QN AUTO: 26.6 PG
MCHC RBC AUTO-ENTMCNC: 32.7 G/DL
MCV RBC AUTO: 81 FL
MONOCYTES # BLD AUTO: 1.2 K/UL
MONOCYTES NFR BLD: 7.3 %
NEUTROPHILS # BLD AUTO: 14 K/UL
NEUTROPHILS NFR BLD: 84.4 %
PLATELET # BLD AUTO: 369 K/UL
PMV BLD AUTO: 10.7 FL
POCT GLUCOSE: 133 MG/DL (ref 70–110)
POCT GLUCOSE: 161 MG/DL (ref 70–110)
POTASSIUM SERPL-SCNC: 3.8 MMOL/L
PROT SERPL-MCNC: 6.4 G/DL
RBC # BLD AUTO: 4.17 M/UL
SODIUM SERPL-SCNC: 143 MMOL/L
WBC # BLD AUTO: 16.51 K/UL

## 2018-01-11 PROCEDURE — 63600175 PHARM REV CODE 636 W HCPCS: Performed by: INTERNAL MEDICINE

## 2018-01-11 PROCEDURE — 80053 COMPREHEN METABOLIC PANEL: CPT

## 2018-01-11 PROCEDURE — 25000003 PHARM REV CODE 250: Performed by: INTERNAL MEDICINE

## 2018-01-11 PROCEDURE — C9113 INJ PANTOPRAZOLE SODIUM, VIA: HCPCS | Performed by: EMERGENCY MEDICINE

## 2018-01-11 PROCEDURE — 63600175 PHARM REV CODE 636 W HCPCS: Performed by: HOSPITALIST

## 2018-01-11 PROCEDURE — 83735 ASSAY OF MAGNESIUM: CPT

## 2018-01-11 PROCEDURE — 99233 SBSQ HOSP IP/OBS HIGH 50: CPT | Mod: ,,, | Performed by: INTERNAL MEDICINE

## 2018-01-11 PROCEDURE — 36415 COLL VENOUS BLD VENIPUNCTURE: CPT

## 2018-01-11 PROCEDURE — 85025 COMPLETE CBC W/AUTO DIFF WBC: CPT

## 2018-01-11 PROCEDURE — 63600175 PHARM REV CODE 636 W HCPCS: Performed by: EMERGENCY MEDICINE

## 2018-01-11 RX ORDER — SPIRONOLACTONE 25 MG/1
25 TABLET ORAL DAILY
Status: DISCONTINUED | OUTPATIENT
Start: 2018-01-11 | End: 2018-01-11

## 2018-01-11 RX ORDER — FUROSEMIDE 20 MG/1
20 TABLET ORAL DAILY
Qty: 30 TABLET | Refills: 11 | Status: SHIPPED | OUTPATIENT
Start: 2018-01-12 | End: 2019-01-12

## 2018-01-11 RX ORDER — PANTOPRAZOLE SODIUM 40 MG/1
40 TABLET, DELAYED RELEASE ORAL DAILY
Qty: 30 TABLET | Refills: 11 | Status: SHIPPED | OUTPATIENT
Start: 2018-01-11 | End: 2019-01-11

## 2018-01-11 RX ORDER — FUROSEMIDE 20 MG/1
20 TABLET ORAL DAILY
Status: DISCONTINUED | OUTPATIENT
Start: 2018-01-12 | End: 2018-01-11 | Stop reason: HOSPADM

## 2018-01-11 RX ORDER — ATORVASTATIN CALCIUM 20 MG/1
20 TABLET, FILM COATED ORAL NIGHTLY
Qty: 90 TABLET | Refills: 3 | Status: SHIPPED | OUTPATIENT
Start: 2018-01-11 | End: 2019-01-11

## 2018-01-11 RX ORDER — LISINOPRIL 10 MG/1
40 TABLET ORAL DAILY
Qty: 120 TABLET | Refills: 0 | Status: ON HOLD | OUTPATIENT
Start: 2018-01-11 | End: 2018-06-09 | Stop reason: HOSPADM

## 2018-01-11 RX ORDER — ASPIRIN 81 MG/1
81 TABLET ORAL DAILY
Refills: 0 | COMMUNITY
Start: 2018-01-11 | End: 2019-01-11

## 2018-01-11 RX ORDER — ATORVASTATIN CALCIUM 10 MG/1
20 TABLET, FILM COATED ORAL NIGHTLY
Status: DISCONTINUED | OUTPATIENT
Start: 2018-01-11 | End: 2018-01-11 | Stop reason: HOSPADM

## 2018-01-11 RX ORDER — CARVEDILOL 25 MG/1
25 TABLET ORAL 2 TIMES DAILY
Qty: 60 TABLET | Refills: 11 | Status: SHIPPED | OUTPATIENT
Start: 2018-01-11 | End: 2019-01-11

## 2018-01-11 RX ADMIN — INSULIN ASPART 2 UNITS: 100 INJECTION, SOLUTION INTRAVENOUS; SUBCUTANEOUS at 06:01

## 2018-01-11 RX ADMIN — PROCHLORPERAZINE EDISYLATE 2.5 MG: 5 INJECTION INTRAMUSCULAR; INTRAVENOUS at 08:01

## 2018-01-11 RX ADMIN — ASPIRIN 81 MG: 81 TABLET, COATED ORAL at 08:01

## 2018-01-11 RX ADMIN — HYDRALAZINE HYDROCHLORIDE 10 MG: 20 INJECTION INTRAMUSCULAR; INTRAVENOUS at 01:01

## 2018-01-11 RX ADMIN — PANTOPRAZOLE SODIUM 40 MG: 40 INJECTION, POWDER, FOR SOLUTION INTRAVENOUS at 08:01

## 2018-01-11 RX ADMIN — SPIRONOLACTONE 25 MG: 25 TABLET ORAL at 08:01

## 2018-01-11 RX ADMIN — LISINOPRIL 40 MG: 20 TABLET ORAL at 08:01

## 2018-01-11 RX ADMIN — HYDRALAZINE HYDROCHLORIDE 10 MG: 20 INJECTION INTRAMUSCULAR; INTRAVENOUS at 06:01

## 2018-01-11 RX ADMIN — CARVEDILOL 25 MG: 6.25 TABLET, FILM COATED ORAL at 08:01

## 2018-01-11 RX ADMIN — PROCHLORPERAZINE EDISYLATE 2.5 MG: 5 INJECTION INTRAMUSCULAR; INTRAVENOUS at 01:01

## 2018-01-11 NOTE — PROGRESS NOTES
TN reviewed S&S for Stomach problems with teach back. TN provided sheet.for home use..Kendy Obrien RN, BSN, [facility]  1/11/2018    TN informed nurse that pt is ready for d/c from  Cm viewpoint..Kendy Obrien RN, BSN, [facility]  1/11/2018

## 2018-01-11 NOTE — SUBJECTIVE & OBJECTIVE
"Past Medical History:   Diagnosis Date    Anxiety     Arthritis     Asthma     Bronchiectasis with acute exacerbation     Bronchitis     Chronic pain     right knee    Coronary artery disease involving native coronary artery of native heart without angina pectoris 1/10/2018    Essential hypertension, benign 11/21/2012    Hypotension, iatrogenic     Insomnia     Knee pain, right     chronic    Mitral valve prolapse     Obstructive chronic bronchitis without exacerbation 11/21/2012    Type II or unspecified type diabetes mellitus without mention of complication, uncontrolled     Type II or unspecified type diabetes mellitus without mention of complication, uncontrolled        Past Surgical History:   Procedure Laterality Date    bowel reconstruction      BREAST SURGERY      CHOLECYSTECTOMY      HYSTERECTOMY      KNEE SURGERY      R, surgeries x 7    Right Leg reconstruction surgery      Rotator Cuff Surgery      TONSILLECTOMY      WRIST SURGERY         Review of patient's allergies indicates:  No Known Allergies    No current facility-administered medications on file prior to encounter.      Current Outpatient Prescriptions on File Prior to Encounter   Medication Sig    ADVAIR DISKUS 250-50 mcg/dose diskus inhaler     bumetanide (BUMEX) 2 MG tablet     diclofenac sodium (VOLTAREN) 1 % Gel Apply 2 g topically 4 (four) times daily.    gabapentin (NEURONTIN) 600 MG tablet     LEVEMIR FLEXPEN 100 unit/mL (3 mL) InPn     lisinopril 10 MG tablet     NOVOLOG FLEXPEN 100 unit/mL InPn     potassium chloride (MICRO-K) 10 MEQ CpSR Take 10 mEq by mouth once daily.      quetiapine (SEROQUEL) 100 MG Tab Take by mouth every evening.    sertraline (ZOLOFT) 100 MG tablet     SURE COMFORT PEN NEEDLE 31 x 3/16 " Ndle     trazodone (DESYREL) 100 MG tablet Take 100 mg by mouth every evening.     Family History     Problem Relation (Age of Onset)    Heart disease Sister, Brother    Hypertension Mother    "     Social History Main Topics    Smoking status: Current Every Day Smoker     Packs/day: 0.25     Years: 45.00     Types: Cigarettes    Smokeless tobacco: Never Used    Alcohol use No    Drug use: Yes     Types: Marijuana    Sexual activity: Not Currently     Partners: Male     Birth control/ protection: None     Review of Systems   Gastrointestinal: Negative for melena.   Genitourinary: Negative for hematuria.     Objective:     Vital Signs (Most Recent):  Temp: 98.2 °F (36.8 °C) (01/11/18 0617)  Pulse: 88 (01/11/18 0617)  Resp: 18 (01/11/18 0617)  BP: (!) 177/74 (01/11/18 0617)  SpO2: 95 % (01/11/18 0617) Vital Signs (24h Range):  Temp:  [96.8 °F (36 °C)-98.9 °F (37.2 °C)] 98.2 °F (36.8 °C)  Pulse:  [] 88  Resp:  [16-18] 18  SpO2:  [93 %-98 %] 95 %  BP: (126-201)/(45-93) 177/74     Weight: 98 kg (216 lb 0.8 oz)  Body mass index is 42.19 kg/m².    SpO2: 95 %  O2 Device (Oxygen Therapy): room air      Intake/Output Summary (Last 24 hours) at 01/11/18 0659  Last data filed at 01/10/18 1834   Gross per 24 hour   Intake              980 ml   Output                0 ml   Net              980 ml       Lines/Drains/Airways     Peripheral Intravenous Line                 Peripheral IV - Single Lumen 01/10/18 0842 Left Hand less than 1 day                Physical Exam   Constitutional: She is oriented to person, place, and time. She appears well-developed and well-nourished. No distress.   HENT:   Head: Normocephalic and atraumatic.   Mouth/Throat: No oropharyngeal exudate.   Eyes: Conjunctivae and EOM are normal. Pupils are equal, round, and reactive to light. No scleral icterus.   Neck: Normal range of motion. Neck supple. No JVD present. No tracheal deviation present.   Cardiovascular: Normal rate, regular rhythm, S1 normal and S2 normal.  Exam reveals no gallop and no friction rub.    No murmur heard.  Pulmonary/Chest: Effort normal and breath sounds normal. No respiratory distress. She has no wheezes.    Abdominal: Soft. Bowel sounds are normal. There is no tenderness.   obese   Musculoskeletal: Normal range of motion. She exhibits no edema.   Neurological: She is alert and oriented to person, place, and time. No cranial nerve deficit.   Skin: Skin is warm and dry. She is not diaphoretic.   Psychiatric: She has a normal mood and affect. Her behavior is normal. Judgment normal.       Current Medications:   aspirin  81 mg Oral Daily    carvedilol  25 mg Oral BID    fluticasone-vilanterol  1 puff Inhalation Daily    lisinopril  40 mg Oral Daily    pantoprazole  40 mg Intravenous Daily    sertraline  100 mg Oral QHS       acetaminophen, acetaminophen, dextrose 50%, glucagon (human recombinant), hydrALAZINE, influenza, insulin aspart, metoprolol, ondansetron, pneumoc 13-amos conj-dip cr(PF), prochlorperazine    Laboratory:  CBC:    Recent Labs  Lab 01/08/18  1628 01/09/18  0659 01/10/18  0648   WHITE BLOOD CELL COUNT 13.71 H 15.00 H 16.60 H   HEMOGLOBIN 12.9 12.6 12.2   HEMATOCRIT 40.3 38.4 36.4 L   PLATELETS 432 H 428 H 394 H       CHEMISTRIES:    Recent Labs  Lab 01/08/18  1628 01/09/18  0659 01/10/18  0648   GLUCOSE 146 H 165 H 174 H   SODIUM 146 H 147 H 142   POTASSIUM 4.1 3.4 L 3.6   BUN BLD 9 10 9   CREATININE 1.1 1.1 1.0   EGFR IF AFRICAN AMERICAN 60 60 >60   EGFR IF NON- 52 A 52 A 58 A   CALCIUM 10.0 9.6 9.0       CARDIAC BIOMARKERS:    Recent Labs  Lab 01/08/18  1854 01/09/18  0037 01/09/18  0600   TROPONIN I 0.198 H 0.191 H 0.175 H       COAGS:    Recent Labs  Lab 10/25/16  0314   INR 1.0       LIPIDS/LFTS:    Recent Labs  Lab 01/08/18  1628 01/09/18  0659 01/10/18  0648   CHOLESTEROL  --   --  162   TRIGLYCERIDES  --   --  102   HDL  --   --  32 L   LDL CHOLESTEROL  --   --  109.6   NON-HDL CHOLESTEROL  --   --  130   AST 13 17 16   ALT 21 21 21     Lab Results   Component Value Date    LIPASE 7 01/09/2018     BNP    Recent Labs  Lab 01/08/18  1854   *     EGD  1/10/18:  Findings:       A small hiatal hernia was present.       Diffuse severe inflammation characterized by erythema was found in        the gastric antrum. Biopsies were taken with a cold forceps for        histology.       Few non-bleeding superficial duodenal ulcers with no stigmata of        bleeding were found in the duodenal bulb.  Impression:          - Small hiatal hernia.                       - Gastritis. Biopsied.                       - Multiple non-bleeding duodenal ulcers with no                        stigmata of bleeding.  Recommendation:      - Return patient to hospital campos for ongoing care.                       - Await pathology results.                       - Use Protonix (pantoprazole) 40 mg PO daily.      Diagnostic Results:  ECG (personally reviewed tracings):   1/8/18 2036 SR 96, PVC, PRWP, inflat st abnl ?isch    CT abd 1/8/18  1.  Intrahepatic and extrahepatic biliary ductal dilatation of unclear etiology.  Gallbladder is not seen and likely has been removed.  Further evaluation may be warranted with MRCP or ERCP.  2.  No CT evidence to suggest acute pancreatitis as clinically questioned.  3.  Colonic diverticulosis with no evidence of acute diverticulitis.  4.  Additional findings as detailed above.    Edith MPI 1/9/18 (images pers rev)  Nuclear Quantitative Functional Analysis:   LVEF: 42 %  LVED Volume: 122 ml  LVES Volume: 71 ml  Impression: ABNORMAL MYOCARDIAL PERFUSION  1. There is a small size fixed defect of mild intensity in the apical wall of the left ventricle, consistent with myocardial injury. There is trivial mode-injury ischemia.   2. There is abnormal wall motion at rest showing mild global hypokinesis of the left ventricle.   3. There is resting LV dysfunction with a reduced ejection fraction of 42 %.   4. The ventricular volumes are normal at rest and stress.   5. The extracardiac distribution of radioactivity is normal.      Echo 1/9/18 (images pers rev)    1 -  Moderately depressed left ventricular systolic function (EF 35-40%).  Moderate global hypokinesis..     2 - Impaired LV relaxation, normal LAP (grade 1 diastolic dysfunction).     3 - Eccentric hypertrophy.     4 - Mild left ventricular enlargement.     5 - Trivial tricuspid regurgitation.     6 - The estimated PA systolic pressure is 36 mmHg.

## 2018-01-11 NOTE — PROGRESS NOTES
[facility]  Cardiology  Progress Note    Patient Name: Raissa Land  MRN: [id number]  Admission Date: 1/8/2018  Hospital Length of Stay: 3 days  Code Status: Full Code   Attending Physician: Sydni Deras MD   Primary Care Physician: Guerrero Hensley Jr, MD  Expected Discharge Date:   Principal Problem:Hypertensive emergency    Subjective:     Interval Hx: EGD yesterday.  Nausea improving.  No CP/SOB.        Past Medical History:   Diagnosis Date    Anxiety     Arthritis     Asthma     Bronchiectasis with acute exacerbation     Bronchitis     Chronic pain     right knee    Coronary artery disease involving native coronary artery of native heart without angina pectoris 1/10/2018    Essential hypertension, benign 11/21/2012    Hypotension, iatrogenic     Insomnia     Knee pain, right     chronic    Mitral valve prolapse     Obstructive chronic bronchitis without exacerbation 11/21/2012    Type II or unspecified type diabetes mellitus without mention of complication, uncontrolled     Type II or unspecified type diabetes mellitus without mention of complication, uncontrolled        Past Surgical History:   Procedure Laterality Date    bowel reconstruction      BREAST SURGERY      CHOLECYSTECTOMY      HYSTERECTOMY      KNEE SURGERY      R, surgeries x 7    Right Leg reconstruction surgery      Rotator Cuff Surgery      TONSILLECTOMY      WRIST SURGERY         Review of patient's allergies indicates:  No Known Allergies    No current facility-administered medications on file prior to encounter.      Current Outpatient Prescriptions on File Prior to Encounter   Medication Sig    ADVAIR DISKUS 250-50 mcg/dose diskus inhaler     bumetanide (BUMEX) 2 MG tablet     diclofenac sodium (VOLTAREN) 1 % Gel Apply 2 g topically 4 (four) times daily.    gabapentin (NEURONTIN) 600 MG tablet     LEVEMIR FLEXPEN 100 unit/mL (3 mL) InPn     lisinopril 10 MG tablet     NOVOLOG FLEXPEN 100  "unit/mL InPn     potassium chloride (MICRO-K) 10 MEQ CpSR Take 10 mEq by mouth once daily.      quetiapine (SEROQUEL) 100 MG Tab Take by mouth every evening.    sertraline (ZOLOFT) 100 MG tablet     SURE COMFORT PEN NEEDLE 31 x 3/16 " Ndle     trazodone (DESYREL) 100 MG tablet Take 100 mg by mouth every evening.     Family History     Problem Relation (Age of Onset)    Heart disease Sister, Brother    Hypertension Mother        Social History Main Topics    Smoking status: Current Every Day Smoker     Packs/day: 0.25     Years: 45.00     Types: Cigarettes    Smokeless tobacco: Never Used    Alcohol use No    Drug use: Yes     Types: Marijuana    Sexual activity: Not Currently     Partners: Male     Birth control/ protection: None     Review of Systems   Gastrointestinal: Negative for melena.   Genitourinary: Negative for hematuria.     Objective:     Vital Signs (Most Recent):  Temp: 98.2 °F (36.8 °C) (01/11/18 0617)  Pulse: 88 (01/11/18 0617)  Resp: 18 (01/11/18 0617)  BP: (!) 177/74 (01/11/18 0617)  SpO2: 95 % (01/11/18 0617) Vital Signs (24h Range):  Temp:  [96.8 °F (36 °C)-98.9 °F (37.2 °C)] 98.2 °F (36.8 °C)  Pulse:  [] 88  Resp:  [16-18] 18  SpO2:  [93 %-98 %] 95 %  BP: (126-201)/(45-93) 177/74     Weight: 98 kg (216 lb 0.8 oz)  Body mass index is 42.19 kg/m².    SpO2: 95 %  O2 Device (Oxygen Therapy): room air      Intake/Output Summary (Last 24 hours) at 01/11/18 0659  Last data filed at 01/10/18 1834   Gross per 24 hour   Intake              980 ml   Output                0 ml   Net              980 ml       Lines/Drains/Airways     Peripheral Intravenous Line                 Peripheral IV - Single Lumen 01/10/18 0842 Left Hand less than 1 day                Physical Exam   Constitutional: She is oriented to person, place, and time. She appears well-developed and well-nourished. No distress.   HENT:   Head: Normocephalic and atraumatic.   Mouth/Throat: No oropharyngeal exudate.   Eyes: " Conjunctivae and EOM are normal. Pupils are equal, round, and reactive to light. No scleral icterus.   Neck: Normal range of motion. Neck supple. No JVD present. No tracheal deviation present.   Cardiovascular: Normal rate, regular rhythm, S1 normal and S2 normal.  Exam reveals no gallop and no friction rub.    No murmur heard.  Pulmonary/Chest: Effort normal and breath sounds normal. No respiratory distress. She has no wheezes.   Abdominal: Soft. Bowel sounds are normal. There is no tenderness.   obese   Musculoskeletal: Normal range of motion. She exhibits no edema.   Neurological: She is alert and oriented to person, place, and time. No cranial nerve deficit.   Skin: Skin is warm and dry. She is not diaphoretic.   Psychiatric: She has a normal mood and affect. Her behavior is normal. Judgment normal.       Current Medications:   aspirin  81 mg Oral Daily    carvedilol  25 mg Oral BID    fluticasone-vilanterol  1 puff Inhalation Daily    lisinopril  40 mg Oral Daily    pantoprazole  40 mg Intravenous Daily    sertraline  100 mg Oral QHS       acetaminophen, acetaminophen, dextrose 50%, glucagon (human recombinant), hydrALAZINE, influenza, insulin aspart, metoprolol, ondansetron, pneumoc 13-amos conj-dip cr(PF), prochlorperazine    Laboratory:  CBC:    Recent Labs  Lab 01/08/18  1628 01/09/18  0659 01/10/18  0648   WHITE BLOOD CELL COUNT 13.71 H 15.00 H 16.60 H   HEMOGLOBIN 12.9 12.6 12.2   HEMATOCRIT 40.3 38.4 36.4 L   PLATELETS 432 H 428 H 394 H       CHEMISTRIES:    Recent Labs  Lab 01/08/18  1628 01/09/18  0659 01/10/18  0648   GLUCOSE 146 H 165 H 174 H   SODIUM 146 H 147 H 142   POTASSIUM 4.1 3.4 L 3.6   BUN BLD 9 10 9   CREATININE 1.1 1.1 1.0   EGFR IF AFRICAN AMERICAN 60 60 >60   EGFR IF NON- 52 A 52 A 58 A   CALCIUM 10.0 9.6 9.0       CARDIAC BIOMARKERS:    Recent Labs  Lab 01/08/18  1854 01/09/18  0037 01/09/18  0600   TROPONIN I 0.198 H 0.191 H 0.175 H       COAGS:    Recent Labs  Lab  10/25/16  0314   INR 1.0       LIPIDS/LFTS:    Recent Labs  Lab 01/08/18  1628 01/09/18  0659 01/10/18  0648   CHOLESTEROL  --   --  162   TRIGLYCERIDES  --   --  102   HDL  --   --  32 L   LDL CHOLESTEROL  --   --  109.6   NON-HDL CHOLESTEROL  --   --  130   AST 13 17 16   ALT 21 21 21     Lab Results   Component Value Date    LIPASE 7 01/09/2018     BNP    Recent Labs  Lab 01/08/18  1854   *     EGD 1/10/18:  Findings:       A small hiatal hernia was present.       Diffuse severe inflammation characterized by erythema was found in        the gastric antrum. Biopsies were taken with a cold forceps for        histology.       Few non-bleeding superficial duodenal ulcers with no stigmata of        bleeding were found in the duodenal bulb.  Impression:          - Small hiatal hernia.                       - Gastritis. Biopsied.                       - Multiple non-bleeding duodenal ulcers with no                        stigmata of bleeding.  Recommendation:      - Return patient to hospital campos for ongoing care.                       - Await pathology results.                       - Use Protonix (pantoprazole) 40 mg PO daily.      Diagnostic Results:  ECG (personally reviewed tracings):   1/8/18 2036 SR 96, PVC, PRWP, inflat st abnl ?isch    CT abd 1/8/18  1.  Intrahepatic and extrahepatic biliary ductal dilatation of unclear etiology.  Gallbladder is not seen and likely has been removed.  Further evaluation may be warranted with MRCP or ERCP.  2.  No CT evidence to suggest acute pancreatitis as clinically questioned.  3.  Colonic diverticulosis with no evidence of acute diverticulitis.  4.  Additional findings as detailed above.    Edith MPI 1/9/18 (images pers rev)  Nuclear Quantitative Functional Analysis:   LVEF: 42 %  LVED Volume: 122 ml  LVES Volume: 71 ml  Impression: ABNORMAL MYOCARDIAL PERFUSION  1. There is a small size fixed defect of mild intensity in the apical wall of the left ventricle,  consistent with myocardial injury. There is trivial mode-injury ischemia.   2. There is abnormal wall motion at rest showing mild global hypokinesis of the left ventricle.   3. There is resting LV dysfunction with a reduced ejection fraction of 42 %.   4. The ventricular volumes are normal at rest and stress.   5. The extracardiac distribution of radioactivity is normal.      Echo 1/9/18 (images pers rev)    1 - Moderately depressed left ventricular systolic function (EF 35-40%).  Moderate global hypokinesis..     2 - Impaired LV relaxation, normal LAP (grade 1 diastolic dysfunction).     3 - Eccentric hypertrophy.     4 - Mild left ventricular enlargement.     5 - Trivial tricuspid regurgitation.     6 - The estimated PA systolic pressure is 36 mmHg.       Assessment and Plan:     * Hypertensive emergency    ?r/t GI process  Note made of elev trop/flat pattern without angina/dyspnea, likely demand related  Cont med rx HTN  MPI with fixed apical defect and echo EF 35%, ?hypertensive CMP.  No further CV testing planned  Cont coreg 25mg bid  Add aldactone 25mg qd  Add atorva 20mg qhs  Check lipids/LFT 3 months (mid Apr 2018)  Cont lisinopril/ASA  Next drug to add: Hydrala +/- Imdur          Elevated troponin    Note made of elev trop/flat pattern without angina/dyspnea, likely demand related  Cont med rx HTN  MPI with fixed apical defect and echo EF 35%, ?hypertensive CMP.  No further CV testing planned        DM type 2 (diabetes mellitus, type 2)    Per IM        Acute pancreatitis    Lipase normalized  GI on board            VTE Risk Mitigation         Ordered     Medium Risk of VTE  Once      01/09/18 0132     Place sequential compression device  Until discontinued      01/09/18 0132     Place GENARO hose  Until discontinued      01/09/18 0132        Cardiology will sign off, pls call with questions  Pt to follow up with me in the office 2 weeks.    Daniel Arevalo MD  Cardiology  [facility]

## 2018-01-11 NOTE — PLAN OF CARE
01/11/18 0945   Medicare Message   Important Message from Medicare regarding Discharge Appeal Rights Given to patient/caregiver;Explained to patient/caregiver;Signed/date by patient/caregiver   Date IMM was signed 01/11/18   Time IMM was signed 0945   Kendy Obrien RN, BSN, STN CCM  1/11/2018

## 2018-01-11 NOTE — PHYSICIAN QUERY
"PT Name: Raissa Land  MR #: [id number]    Physician Query Form - Hematology Clarification      CDS/: Elena Noe               Contact information:[email or web address]    This form is a permanent document in the medical record.      Query Date: January 11, 2018    By submitting this query, we are merely seeking further clarification of documentation. Please utilize your independent clinical judgment when addressing the question(s) below.    The Medical record contains the following:   Indicators  Supporting Clinical Findings Location in Medical Record   X "Anemia" documented Anemia H&P   X H & H = Hemoglobin=12.9-12.6-12.2 11.1  Hematocrit=40.3-38.4-36.4-  33.9 Labs 1-8 to 1-11    BP =                     HR=      "GI bleeding" documented      Acute bleeding (Non GI site)      Transfusion(s)     X Treatment: EGD 1/10/18:    Findings:             A small hiatal hernia was present.       Diffuse severe inflammation characterized by erythema was found in         the gastric antrum. Biopsies were taken with a cold forceps for        histology.       Few non-bleeding superficial duodenal ulcers with no stigmata of         bleeding were found in the duodenal bulb.   Impression:               - Small hiatal hernia.                        - Gastritis. Biopsied.                       - Multiple non-bleeding duodenal ulcers with no                         stigmata of bleeding   Cardiology note 1-11    Other:        Provider, please specify diagnosis or diagnoses associated with above clinical findings.    [  ] Precipitous drop in Hematocrit    [x  ] Anemia of chronic disease ( Specify chronic disease)       ___________________________     [  ] Other (Specify) _______________________________     [  ] Clinically Undetermined       Please document in your progress notes daily for the duration of treatment, until resolved, and include in your discharge summary.                                                "

## 2018-01-11 NOTE — PLAN OF CARE
Problem: Nausea/Vomiting (Adult)  Intervention: Minimize Nausea Triggers/Manage Symptoms   01/10/18 1200 01/10/18 1809   Monitor/Manage Hypovolemia   Nausea/Vomiting Interventions medication given --    Coping/Psychosocial Interventions   Environmental Support --  calm environment promoted;rest periods encouraged     Intervention: Position to Prevent Aspiration   01/10/18 6370   Positioning   Head of Bed (HOB) HOB at 45 degrees   Body Position positioned/repositioned independently;supine, head elevated

## 2018-01-11 NOTE — ASSESSMENT & PLAN NOTE
Note made of elev trop/flat pattern without angina/dyspnea, likely demand related  Cont med rx HTN  MPI with fixed apical defect and echo EF 35%, ?hypertensive CMP.  No further CV testing planned

## 2018-01-11 NOTE — PROGRESS NOTES
WRITTEN DISCHARGE INFORMATION:     Follow-up Information     Guerrero Hensley Jr, MD.    Specialty:  Family Medicine  Why:  out patient services:  TN called office and left message  Contact information:  [address]  [address]  [facility] [address]  [phone]             Daniel Arevalo MD On 1/25/2018.    Specialties:  Cardiology, INTERVENTIONAL CARDIOLOGY  Why:  out patient services:  10:20 a.m.. follow up from the hospital  Contact information:  [address]  [address]  [facility] [address]  [phone]             Ham Fenton MD On 1/15/2018.    Specialty:  Gastroenterology  Why:  out patient services:  11:30 a.m. follow up from the hospital  Contact information:  [address] [place] [address]  [address]  [facility] [place]  Willi CAZARES [id number] [phone]               Things that YOU are responsible for to Manage Your Care At Home:  1. Getting your prescriptions filled.  2. Taking you medications as directed. DO NOT MISS ANY DOSES!  3. Going to your follow-up doctor appointments. This is important because it allows the doctor to monitor your progress and to determine if any changes need to be made to your treatment plan.                                                          Help at Home  After discharge for assistance [facility] On [facility] Line 24/7 assistance  [phone]     Thank you for choosing [facility] for your care.  Please answer any calls you may receive from [facility] we want to continue to support you as you manage your healthcare needs.  Sincerely, Your [facility] Healthcare Manager is,  Kendy Obrien RN [facility] [phone]

## 2018-01-11 NOTE — ANESTHESIA PREPROCEDURE EVALUATION
01/11/2018  Raissa Land is a 67 y.o., female.    Anesthesia Evaluation    I have reviewed the Patient Summary Reports.    I have reviewed the Nursing Notes.   I have reviewed the Medications.     Review of Systems  Anesthesia Hx:  No problems with previous Anesthesia Denies Hx of Anesthetic complications  Neg history of prior surgery. Denies Family Hx of Anesthesia complications.   Denies Personal Hx of Anesthesia complications.   Social:  Non-Smoker, No Alcohol Use    Hematology/Oncology:  Hematology Normal   Oncology Normal     EENT/Dental:EENT/Dental Normal   Cardiovascular:   Exercise tolerance: good Hypertension CAD      Pulmonary:   COPD Asthma    Renal/:  Renal/ Normal     Hepatic/GI:  Hepatic/GI Normal    Musculoskeletal:   Arthritis     Neurological:  Neurology Normal    Endocrine:   Diabetes    Dermatological:  Skin Normal    Psych:   anxiety          Physical Exam  General:  Well nourished    Airway/Jaw/Neck:  Airway Findings: Mouth Opening: Normal General Airway Assessment: Adult  Mallampati: II  TM Distance: Normal, at least 6 cm         Dental:  DENTAL FINDINGS: Normal   Chest/Lungs:  Chest/Lungs Clear    Heart/Vascular:  Heart Findings: Normal Heart murmur: negative       Mental Status:  Mental Status Findings:  Cooperative, Alert and Oriented         Anesthesia Plan  Type of Anesthesia, risks & benefits discussed:  Anesthesia Type:  general  Patient's Preference:   Intra-op Monitoring Plan:   Intra-op Monitoring Plan Comments:   Post Op Pain Control Plan:   Post Op Pain Control Plan Comments:   Induction:   IV  Beta Blocker:  Patient is on a Beta-Blocker and has received one dose within the past 24 hours (No further documentation required).       Informed Consent: Patient understands risks and agrees with Anesthesia plan.  Questions answered. Anesthesia consent signed with patient.  ASA  Score: 2     Day of Surgery Review of History & Physical:            Ready For Surgery From Anesthesia Perspective.

## 2018-01-11 NOTE — ANESTHESIA POSTPROCEDURE EVALUATION
Anesthesia Post Evaluation    Patient: Raissa Land    Procedure(s) Performed: Procedure(s) (LRB):  ESOPHAGOGASTRODUODENOSCOPY (EGD) (Left)    Final Anesthesia Type: general  Patient location during evaluation: GI PACU  Patient participation: Yes- Able to Participate  Level of consciousness: awake and alert, oriented and awake  Post-procedure vital signs: reviewed and stable  Airway patency: patent  PONV status at discharge: No PONV  Anesthetic complications: no      Cardiovascular status: blood pressure returned to baseline  Respiratory status: unassisted, spontaneous ventilation and room air  Hydration status: euvolemic  Follow-up not needed.        Visit Vitals  BP (!) 177/74 (BP Location: Right arm, Patient Position: Lying)   Pulse 88   Temp 36.8 °C (98.2 °F) (Oral)   Resp 18   Ht 5' (1.524 m)   Wt 98 kg (216 lb 0.8 oz)   SpO2 95%   Breastfeeding? No   BMI 42.19 kg/m²       Pain/Rianna Score: Pain Assessment Performed: Yes (1/11/2018  6:00 AM)  Presence of Pain: denies (1/11/2018  6:00 AM)  Rianna Score: 10 (1/10/2018 10:25 AM)

## 2018-01-11 NOTE — HOSPITAL COURSE
"Ms Land presented with hypertensive emergency. BP controlled. Noted troponemia without chest pain or ST segment depression or elevation. Given risk factors, cardiology was consulted. 2D Echo with LV EF 35-45% and grade 1 diastolic dysfunction without clinical signs of decompensation. Nuclear stress test with small size fixed defect of mild intensity in the apical wall of the left ventricle, consistent with myocardial injury, and trivial mode-injury ischemia. Patient continued on secondary prevention with aspirin, BB, ACE-I, statin and blood pressure control as this is likely demand from hypertensive emergency rather than true NSTEMI. No further intervention warranted, per cardiology. Also placed on diuretics. Renal function remained stable. As for nausea and vomiting, patient did have elevated lipase to 323 but without chemical evidence of cholestasis. Is s/p cholecystectomy. US was CBD dilation without evident etiology. GI consulted. No MRCP or ERCP pursued as lipase returned to normal within 24 hour period with fluids. Underwent EGD. This showed gastritis (biopsied), small hiatal hernia and non bleeding duodenal ulcers without stigmata of bleeding. GI was ok with continuing ASA 81mg for secondary CAD prevention. Is on PPI daily. Patient tolerated diet afterwards although some nausea present which she attributed to "hospital food". Otherwise clinically stable and functionally back to baseline. BG control. Weight loss. Diabetic/cardiac diet. Activity as tolerated. F/u with PCP, cardiology and GI.  "

## 2018-01-11 NOTE — NURSING
Patient denies any complaints. IV cathter removed, pt tolerated well. Pt's daughter at bedside assisting patient to dress..

## 2018-01-11 NOTE — DISCHARGE SUMMARY
[facility] Hospital Medicine  Discharge Summary      Patient Name: Raissa Land  MRN: [id number]  Admission Date: 1/8/2018  Hospital Length of Stay: 3 days  Discharge Date and Time:  01/11/2018 12:26 PM  Attending Physician: Sydni Deras MD   Discharging Provider: Sydni Reyes MD  Primary Care Provider: Guerrero Hensley Jr, MD      HPI:   Raissa Land is a 67 y.o. female that (in part)  has a past medical history of Anxiety; Arthritis; Asthma; Bronchiectasis with acute exacerbation; Bronchitis; Chronic pain; Essential hypertension, benign; Hypotension, iatrogenic; Insomnia; Knee pain, right; Mitral valve prolapse; Obstructive chronic bronchitis without exacerbation; Type II or unspecified type diabetes mellitus without mention of complication, uncontrolled; and Type II or unspecified type diabetes mellitus without mention of complication, uncontrolled. Presents to [facility] Emergency Department  presents with several hour duration of epigastric pain. Patient reports no history of previous pancreatitis that has required hospitalization.  Patient denies having any alcoholic drinks. Patient is experiencing persistent, deep, aching abdominal pain and points to the epigastric area when asked where the most severe pain is located. Patient admits to having associated symptoms including nausea with vomiting, but denies fever and chills, and has not been able to eat or drink since onset of abdominal pain.  Pain is exacerbated by palpation of the epigastrium as well as oral intake.  Relieved with pain medication given in the ER. Complains that the pain is severe, progressive, and worsening.    She has had previous pancreatic surgery.  Reports associated symptoms of constipation no bowel movements in 3 days.  Also reportedly febrile 100.6°F.  Initially she was hypertensive with a blood pressure of 220 and was minimally responsive to initial dose of labetalol.  She was also found to  have leukocytosis as well as anemia with an anion gap of 16.  Her influenza screen was negative.  CT abdomen pelvis was also obtained but did not show any abnormalities.  She has had low urine output and was given IV fluids in the ED and we are pending urinalysis.  General surgery has been consulted.      In the emergency department routine labs including lipase were obtained along with abdominal imaging.  Findings were consistent with acute pancreatitis.  She also had evidence of new T-wave inversions in anterolateral leads as well as elevated troponin level.  There is concern she was having hypertensive emergency due to a systolic blood pressure of ~220mmHg.     The patient is being admitted to Hospital medicine service for IV fluids, bowel rest, and serial monitoring of pancreatic enzymes in addition to further evaluation and treatment as outlined below in detail.  GI consultation if symptoms do not improve within a reasonable amount of time.      Procedure(s) (LRB):  ESOPHAGOGASTRODUODENOSCOPY (EGD) (Left)      Hospital Course:   Ms Land presented with hypertensive emergency. BP controlled. Noted troponemia without chest pain or ST segment depression or elevation. Given risk factors, cardiology was consulted. 2D Echo with LV EF 35-45% and grade 1 diastolic dysfunction without clinical signs of decompensation. Nuclear stress test with small size fixed defect of mild intensity in the apical wall of the left ventricle, consistent with myocardial injury, and trivial mode-injury ischemia. Patient continued on secondary prevention with aspirin, BB, ACE-I, statin and blood pressure control as this is likely demand from hypertensive emergency rather than true NSTEMI. No further intervention warranted, per cardiology. Also placed on diuretics. Renal function remained stable. As for nausea and vomiting, patient did have elevated lipase to 323 but without chemical evidence of cholestasis. Is s/p cholecystectomy. US was CBD  "dilation without evident etiology. GI consulted. No MRCP or ERCP pursued as lipase returned to normal within 24 hour period with fluids. Underwent EGD. This showed gastritis (biopsied), small hiatal hernia and non bleeding duodenal ulcers without stigmata of bleeding. GI was ok with continuing ASA 81mg for secondary CAD prevention. Is on PPI daily. Patient tolerated diet afterwards although some nausea present which she attributed to "hospital food". Otherwise clinically stable and functionally back to baseline. BG control. Weight loss. Diabetic/cardiac diet. Activity as tolerated. F/u with PCP, cardiology and GI.     Consults:   Consults         Status Ordering Provider     Consult to Anesthesiology  Once     Provider:  (Not yet assigned)    Acknowledged DASHA FENTON     Inpatient consult to Cardiology  Once     Provider:  Daniel Arevalo MD    Completed SARITHA SALGADO     Inpatient consult to Gastroenterology  Once     Provider:  Dasha Fenton MD    Acknowledged DANIEL DE PAZ          Final Active Diagnoses:    Diagnosis Date Noted POA    PRINCIPAL PROBLEM:  Hypertensive emergency [I16.1] 01/09/2018 Yes    Nausea and vomiting [R11.2] 01/10/2018 Yes    Coronary artery disease involving native coronary artery of native heart without angina pectoris [I25.10] 01/10/2018 Yes    Chronic systolic heart failure [I50.22] 01/10/2018 Yes    Acute pancreatitis [K85.90] 01/09/2018 Yes    Dehydration with hypernatremia [E87.0] 01/09/2018 Yes    Elevated troponin [R74.8] 01/11/2018 Yes    DM type 2 (diabetes mellitus, type 2) [E11.9] 01/09/2018 Yes    Tobacco abuse [Z72.0] 01/09/2018 Yes    Chronic pain of multiple sites [R52, G89.29] 03/26/2014 Yes    Class 3 severe obesity due to excess calories with serious comorbidity in adult [E66.01] 01/31/2013 Yes    Essential hypertension, benign [I10] 11/21/2012 Yes     Chronic      Problems Resolved During this Admission:    Diagnosis Date Noted Date " Resolved POA    NSTEMI (non-ST elevated myocardial infarction) [I21.4] 01/08/2018 01/10/2018 Yes       Discharged Condition: good    Disposition: Home or Self Care    Follow Up:  Follow-up Information     Guerrero Hensley Jr, MD.    Specialty:  Family Medicine  Why:  out patient services:  [place] called office and left message  Contact information:  [address]  [address]  [facility] [address]  [phone]             Daniel Arevalo MD On 1/25/2018.    Specialties:  Cardiology, INTERVENTIONAL CARDIOLOGY  Why:  out patient services:  10:20 a.m.. follow up from the hospital  Contact information:  [address]  [address]  [facility] [address]  [phone]             Ham Fenton MD On 1/15/2018.    Specialty:  Gastroenterology  Why:  out patient services:  11:30 a.m. follow up from the hospital  Contact information:  [address] [place] [address]  [address]  [facility]  [facility] [address]  [phone]                   Reconciled Home Medications:   Current Discharge Medication List      START taking these medications    Details   aspirin (ECOTRIN) 81 MG EC tablet Take 1 tablet (81 mg total) by mouth once daily.  Refills: 0      atorvastatin (LIPITOR) 20 MG tablet Take 1 tablet (20 mg total) by mouth every evening.  Qty: 90 tablet, Refills: 3      carvedilol (COREG) 25 MG tablet Take 1 tablet (25 mg total) by mouth 2 (two) times daily.  Qty: 60 tablet, Refills: 11      furosemide (LASIX) 20 MG tablet Take 1 tablet (20 mg total) by mouth once daily.  Qty: 30 tablet, Refills: 11      pantoprazole (PROTONIX) 40 MG tablet Take 1 tablet (40 mg total) by mouth once daily.  Qty: 30 tablet, Refills: 11         CONTINUE these medications which have CHANGED    Details   lisinopril 10 MG tablet Take 4 tablets (40 mg total) by mouth once daily.  Qty: 120 tablet, Refills: 0         CONTINUE these medications which have NOT CHANGED    Details   ADVAIR DISKUS 250-50 mcg/dose diskus  "inhaler       diclofenac sodium (VOLTAREN) 1 % Gel Apply 2 g topically 4 (four) times daily.  Qty: 5 Tube, Refills: 5    Associated Diagnoses: Primary localized osteoarthrosis, lower leg      gabapentin (NEURONTIN) 600 MG tablet       LEVEMIR FLEXPEN 100 unit/mL (3 mL) InPn       NOVOLOG FLEXPEN 100 unit/mL InPn       potassium chloride (MICRO-K) 10 MEQ CpSR Take 10 mEq by mouth once daily.        quetiapine (SEROQUEL) 100 MG Tab Take by mouth every evening.      sertraline (ZOLOFT) 100 MG tablet       SURE COMFORT PEN NEEDLE 31 x 3/16 " Ndle       trazodone (DESYREL) 100 MG tablet Take 100 mg by mouth every evening.             Indwelling Lines/Drains at time of discharge:   Lines/Drains/Airways          No matching active lines, drains, or airways          Time spent on the discharge of patient: > 45 minutes  Patient was seen and examined on the date of discharge and determined to be suitable for discharge.         Sydni Reyes MD  Department of Hospital Medicine  [facility]  "

## 2018-01-11 NOTE — PLAN OF CARE
Problem: Nausea/Vomiting (Adult)  Intervention: Minimize Nausea Triggers/Manage Symptoms   01/11/18 0613   Monitor/Manage Hypovolemia   Nausea/Vomiting Interventions sips clear liquids given   Coping/Psychosocial Interventions   Environmental Support calm environment promoted     Intervention: Position to Prevent Aspiration   01/11/18 0613   Positioning   Head of Bed (HOB) HOB elevated     Intervention: Promote/Maintain Hydration   01/11/18 0613   Nutrition Interventions   Fluid/Electrolyte Management fluids provided         Comments: Pt states that her nausea symptoms have improved; did receive one dose of Compazine IVP this shift; no vomiting noted this shift; will continue to monitor

## 2018-01-11 NOTE — ASSESSMENT & PLAN NOTE
?r/t GI process  Note made of elev trop/flat pattern without angina/dyspnea, likely demand related  Cont med rx HTN  MPI with fixed apical defect and echo EF 35%, ?hypertensive CMP.  No further CV testing planned  Cont coreg 25mg bid  Add aldactone 25mg qd  Add atorva 20mg qhs  Check lipids/LFT 3 months (mid Apr 2018)  Cont lisinopril/ASA  Next drug to add: Hydrala +/- Imdur

## 2018-01-11 NOTE — PLAN OF CARE
01/11/18 1208   Final Note   Assessment Type Final Discharge Note   Discharge Disposition Home   What phone number can be called within the next 1-3 days to see how you are doing after discharge? (see chart)   Hospital Follow Up  Appt(s) scheduled? Yes   Discharge plans and expectations educations in teach back method with documentation complete? Yes   Right Care Referral Info   Post Acute Recommendation No Care

## 2018-01-11 NOTE — NURSING
Report received; pt awake and alert, up in chair; resp even and unlabored no acute distress noted; denies pain; no needs voiced

## 2018-01-15 ENCOUNTER — PATIENT OUTREACH (OUTPATIENT)
Dept: ADMINISTRATIVE | Facility: CLINIC | Age: 68
End: 2018-01-15

## 2018-01-15 NOTE — PATIENT INSTRUCTIONS
Fall Prevention   Falls often occur due to slipping, tripping or losing your balance. Here are ways to reduce your risk of falling again.   Was there anything that caused your fall that can be fixed, removed or replaced?   Make your home safe by keeping walkways clear of objects you may trip over.   Use non-slip pads under rugs.   Do not walk in poorly lit areas.   Do not stand on chairs or wobbly ladders.   Use caution when reaching overhead or looking upward. This position can cause a loss of balance.   Be sure your shoes fit properly, have non-slip bottoms and are in good condition.   Be cautious when going up and down stairs, curbs, and when walking on uneven sidewalks.   If your balance is poor, consider using a cane or walker.   If your fall was related to alcohol use, stop or limit alcohol intake.   If your fall was related to use of sleeping medicines, talk to your doctor about this. You may need to reduce your dosage at bedtime if you awaken during the night to go to the bathroom.   To reduce the need for nighttime bathroom trips:   Avoid drinking fluids for several hours before going to bed   Empty your bladder before going to bed   Men can keep a urinal at the bedside  Stay as active as you can. Balance, flexibility, strength, and endurance all come from exercise. They all play a role in preventing falls. Ask your heathcare provider which types of activity are right for you.  © 9976-4327 [employer] [address] [place] [address], [place] [address]. All rights reserved. This information is not intended as a substitute for professional medical care. Always follow your healthcare professional's instructions.

## 2018-01-15 NOTE — Clinical Note
Please forward this important TCC information to your provider in order to maximize the post discharge care delivery of this patient.  C3 nurse spoke with Raissa Land  for a TCC post hospital discharge follow up call. The patient does not have a scheduled HOSFU appointment with Guerrero Hensley Jr, MD within 7-14 days post hospital discharge date 1/11. C3 nurse was unable to schedule HOSFU appointment in [facility]. Please contact patient and schedule follow up appointment using HOSFU visit type on or before 1/18.  Respectfully, Yanna Obrien RN  [facility]   [email or web address]     Please do not reply to this message, as this inbox is not routinely monitored.

## 2018-06-03 ENCOUNTER — HOSPITAL ENCOUNTER (INPATIENT)
Facility: HOSPITAL | Age: 68
LOS: 6 days | Discharge: HOME HEALTH | DRG: 872 | End: 2018-06-09
Attending: EMERGENCY MEDICINE | Admitting: INTERNAL MEDICINE
Payer: MEDICARE

## 2018-06-03 DIAGNOSIS — R73.9 HYPERGLYCEMIA: ICD-10-CM

## 2018-06-03 DIAGNOSIS — R10.30 LOWER ABDOMINAL PAIN: ICD-10-CM

## 2018-06-03 DIAGNOSIS — D64.9 ANEMIA, UNSPECIFIED TYPE: ICD-10-CM

## 2018-06-03 DIAGNOSIS — L03.319 CELLULITIS OF PUBIC REGION: ICD-10-CM

## 2018-06-03 DIAGNOSIS — A41.9 SEPSIS, DUE TO UNSPECIFIED ORGANISM: Primary | ICD-10-CM

## 2018-06-03 DIAGNOSIS — A41.9 SEPSIS: ICD-10-CM

## 2018-06-03 DIAGNOSIS — K62.5 RECTAL BLEED: ICD-10-CM

## 2018-06-03 PROBLEM — E78.5 HYPERLIPIDEMIA: Chronic | Status: ACTIVE | Noted: 2018-06-03

## 2018-06-03 PROBLEM — N17.9 ACUTE RENAL FAILURE: Status: ACTIVE | Noted: 2018-06-03

## 2018-06-03 PROBLEM — R11.2 NAUSEA AND VOMITING: Status: RESOLVED | Noted: 2018-01-10 | Resolved: 2018-06-03

## 2018-06-03 PROBLEM — I25.10 CORONARY ARTERY DISEASE INVOLVING NATIVE CORONARY ARTERY OF NATIVE HEART WITHOUT ANGINA PECTORIS: Chronic | Status: ACTIVE | Noted: 2018-01-10

## 2018-06-03 PROBLEM — I16.1 HYPERTENSIVE EMERGENCY: Status: RESOLVED | Noted: 2018-01-09 | Resolved: 2018-06-03

## 2018-06-03 PROBLEM — E87.0 DEHYDRATION WITH HYPERNATREMIA: Status: RESOLVED | Noted: 2018-01-09 | Resolved: 2018-06-03

## 2018-06-03 PROBLEM — I50.42 CHRONIC COMBINED SYSTOLIC AND DIASTOLIC HEART FAILURE: Chronic | Status: ACTIVE | Noted: 2018-01-10

## 2018-06-03 PROBLEM — D63.8 ANEMIA OF CHRONIC DISEASE: Chronic | Status: ACTIVE | Noted: 2018-06-03

## 2018-06-03 PROBLEM — E11.9 DM TYPE 2 (DIABETES MELLITUS, TYPE 2): Status: RESOLVED | Noted: 2018-01-09 | Resolved: 2018-06-03

## 2018-06-03 PROBLEM — R65.20 SEVERE SEPSIS: Status: ACTIVE | Noted: 2018-06-03

## 2018-06-03 PROBLEM — K85.90 ACUTE PANCREATITIS: Status: RESOLVED | Noted: 2018-01-09 | Resolved: 2018-06-03

## 2018-06-03 PROBLEM — F32.A DEPRESSION: Chronic | Status: ACTIVE | Noted: 2018-06-03

## 2018-06-03 PROBLEM — K21.9 GERD (GASTROESOPHAGEAL REFLUX DISEASE): Chronic | Status: ACTIVE | Noted: 2018-06-03

## 2018-06-03 PROBLEM — Z72.0 TOBACCO ABUSE: Chronic | Status: ACTIVE | Noted: 2018-01-09

## 2018-06-03 PROBLEM — E86.0 DEHYDRATION WITH HYPERNATREMIA: Status: RESOLVED | Noted: 2018-01-09 | Resolved: 2018-06-03

## 2018-06-03 PROBLEM — R79.89 ELEVATED TROPONIN: Status: RESOLVED | Noted: 2018-01-11 | Resolved: 2018-06-03

## 2018-06-03 LAB
ALBUMIN SERPL-MCNC: 3 G/DL (ref 3.3–5.5)
ALP SERPL-CCNC: 354 U/L (ref 42–141)
BILIRUB SERPL-MCNC: 0.4 MG/DL (ref 0.2–1.6)
BILIRUBIN, POC UA: NEGATIVE
BLOOD, POC UA: ABNORMAL
BUN SERPL-MCNC: 16 MG/DL (ref 7–22)
CALCIUM SERPL-MCNC: 9.1 MG/DL (ref 8–10.3)
CHLORIDE SERPL-SCNC: 101 MMOL/L (ref 98–108)
CLARITY, POC UA: CLEAR
COLOR, POC UA: YELLOW
CREAT SERPL-MCNC: 1.4 MG/DL (ref 0.6–1.2)
CTP QC/QA: YES
FECAL OCCULT BLOOD, POC: POSITIVE
GLUCOSE SERPL-MCNC: 260 MG/DL (ref 73–118)
GLUCOSE, POC UA: NEGATIVE
HCO3 UR-SCNC: 29.9 MMOL/L (ref 24–28)
KETONES, POC UA: NEGATIVE
LACTATE SERPL-SCNC: 1.7 MMOL/L
LDH SERPL L TO P-CCNC: 1.48 MMOL/L (ref 0.5–2.2)
LEUKOCYTE EST, POC UA: NEGATIVE
NITRITE, POC UA: NEGATIVE
PCO2 BLDA: 49 MMHG (ref 35–45)
PH SMN: 7.39 [PH] (ref 7.35–7.45)
PH UR STRIP: 5.5 [PH]
PO2 BLDA: 34 MMHG (ref 40–60)
POC ALT (SGPT): 41 U/L (ref 10–47)
POC AST (SGOT): 30 U/L (ref 11–38)
POC BE: 4 MMOL/L
POC CARDIAC TROPONIN I: 0 NG/ML
POC PTINR: 1.2 (ref 0.9–1.2)
POC PTWBT: 13.7 SEC (ref 9.7–14.3)
POC SATURATED O2: 64 % (ref 95–100)
POC TCO2: 30 MMOL/L (ref 18–33)
POC TCO2: 31 MMOL/L (ref 24–29)
POCT GLUCOSE: 252 MG/DL (ref 70–110)
POCT GLUCOSE: 322 MG/DL (ref 70–110)
POTASSIUM BLD-SCNC: 3.9 MMOL/L (ref 3.6–5.1)
PROTEIN, POC UA: ABNORMAL
PROTEIN, POC: 6.9 G/DL (ref 6.4–8.1)
SAMPLE: ABNORMAL
SAMPLE: NORMAL
SAMPLE: NORMAL
SODIUM BLD-SCNC: 143 MMOL/L (ref 128–145)
SPECIFIC GRAVITY, POC UA: 1.01
UROBILINOGEN, POC UA: 0.2 E.U./DL

## 2018-06-03 PROCEDURE — 25000003 PHARM REV CODE 250: Performed by: EMERGENCY MEDICINE

## 2018-06-03 PROCEDURE — 81003 URINALYSIS AUTO W/O SCOPE: CPT

## 2018-06-03 PROCEDURE — 63600175 PHARM REV CODE 636 W HCPCS: Performed by: EMERGENCY MEDICINE

## 2018-06-03 PROCEDURE — 11000001 HC ACUTE MED/SURG PRIVATE ROOM

## 2018-06-03 PROCEDURE — 93005 ELECTROCARDIOGRAM TRACING: CPT

## 2018-06-03 PROCEDURE — 96372 THER/PROPH/DIAG INJ SC/IM: CPT

## 2018-06-03 PROCEDURE — 85025 COMPLETE CBC W/AUTO DIFF WBC: CPT

## 2018-06-03 PROCEDURE — 83605 ASSAY OF LACTIC ACID: CPT

## 2018-06-03 PROCEDURE — 82270 OCCULT BLOOD FECES: CPT

## 2018-06-03 PROCEDURE — 36415 COLL VENOUS BLD VENIPUNCTURE: CPT

## 2018-06-03 PROCEDURE — 85610 PROTHROMBIN TIME: CPT

## 2018-06-03 PROCEDURE — 63600175 PHARM REV CODE 636 W HCPCS: Performed by: INTERNAL MEDICINE

## 2018-06-03 PROCEDURE — 80053 COMPREHEN METABOLIC PANEL: CPT

## 2018-06-03 PROCEDURE — 25000003 PHARM REV CODE 250: Performed by: INTERNAL MEDICINE

## 2018-06-03 PROCEDURE — 93010 ELECTROCARDIOGRAM REPORT: CPT | Mod: ,,, | Performed by: INTERNAL MEDICINE

## 2018-06-03 PROCEDURE — 96365 THER/PROPH/DIAG IV INF INIT: CPT

## 2018-06-03 PROCEDURE — 99285 EMERGENCY DEPT VISIT HI MDM: CPT | Mod: 25

## 2018-06-03 PROCEDURE — 84484 ASSAY OF TROPONIN QUANT: CPT

## 2018-06-03 PROCEDURE — 87040 BLOOD CULTURE FOR BACTERIA: CPT | Mod: 59

## 2018-06-03 PROCEDURE — 96375 TX/PRO/DX INJ NEW DRUG ADDON: CPT

## 2018-06-03 PROCEDURE — 82803 BLOOD GASES ANY COMBINATION: CPT

## 2018-06-03 RX ORDER — CARVEDILOL 6.25 MG/1
25 TABLET ORAL 2 TIMES DAILY
Status: DISCONTINUED | OUTPATIENT
Start: 2018-06-03 | End: 2018-06-04

## 2018-06-03 RX ORDER — ASPIRIN 81 MG/1
81 TABLET ORAL DAILY
Status: DISCONTINUED | OUTPATIENT
Start: 2018-06-04 | End: 2018-06-04

## 2018-06-03 RX ORDER — INSULIN ASPART 100 [IU]/ML
1-10 INJECTION, SOLUTION INTRAVENOUS; SUBCUTANEOUS
Status: DISCONTINUED | OUTPATIENT
Start: 2018-06-03 | End: 2018-06-09 | Stop reason: HOSPADM

## 2018-06-03 RX ORDER — DIAZEPAM 5 MG/1
10 TABLET ORAL
Status: COMPLETED | OUTPATIENT
Start: 2018-06-03 | End: 2018-06-03

## 2018-06-03 RX ORDER — OXYCODONE AND ACETAMINOPHEN 10; 325 MG/1; MG/1
1 TABLET ORAL EVERY 4 HOURS PRN
Status: DISCONTINUED | OUTPATIENT
Start: 2018-06-03 | End: 2018-06-09 | Stop reason: HOSPADM

## 2018-06-03 RX ORDER — MORPHINE SULFATE 8 MG/ML
10 INJECTION INTRAMUSCULAR; INTRAVENOUS; SUBCUTANEOUS
Status: COMPLETED | OUTPATIENT
Start: 2018-06-03 | End: 2018-06-03

## 2018-06-03 RX ORDER — CLONIDINE HYDROCHLORIDE 0.1 MG/1
0.1 TABLET ORAL 3 TIMES DAILY PRN
Status: DISCONTINUED | OUTPATIENT
Start: 2018-06-03 | End: 2018-06-09 | Stop reason: HOSPADM

## 2018-06-03 RX ORDER — IPRATROPIUM BROMIDE AND ALBUTEROL SULFATE 2.5; .5 MG/3ML; MG/3ML
3 SOLUTION RESPIRATORY (INHALATION) EVERY 6 HOURS PRN
Status: DISCONTINUED | OUTPATIENT
Start: 2018-06-03 | End: 2018-06-09 | Stop reason: HOSPADM

## 2018-06-03 RX ORDER — VANCOMYCIN HCL IN 5 % DEXTROSE 1G/250ML
1000 PLASTIC BAG, INJECTION (ML) INTRAVENOUS
Status: COMPLETED | OUTPATIENT
Start: 2018-06-03 | End: 2018-06-03

## 2018-06-03 RX ORDER — RAMELTEON 8 MG/1
8 TABLET ORAL NIGHTLY PRN
Status: DISCONTINUED | OUTPATIENT
Start: 2018-06-03 | End: 2018-06-09 | Stop reason: HOSPADM

## 2018-06-03 RX ORDER — OXYCODONE AND ACETAMINOPHEN 10; 325 MG/1; MG/1
1 TABLET ORAL EVERY 4 HOURS PRN
COMMUNITY

## 2018-06-03 RX ORDER — GLUCAGON 1 MG
1 KIT INJECTION
Status: DISCONTINUED | OUTPATIENT
Start: 2018-06-03 | End: 2018-06-09 | Stop reason: HOSPADM

## 2018-06-03 RX ORDER — PANTOPRAZOLE SODIUM 40 MG/1
40 TABLET, DELAYED RELEASE ORAL DAILY
Status: DISCONTINUED | OUTPATIENT
Start: 2018-06-04 | End: 2018-06-09 | Stop reason: HOSPADM

## 2018-06-03 RX ORDER — OXYCODONE HYDROCHLORIDE 30 MG/1
30 TABLET, FILM COATED, EXTENDED RELEASE ORAL EVERY 12 HOURS
COMMUNITY

## 2018-06-03 RX ORDER — HEPARIN SODIUM 5000 [USP'U]/ML
5000 INJECTION, SOLUTION INTRAVENOUS; SUBCUTANEOUS EVERY 12 HOURS
Status: DISCONTINUED | OUTPATIENT
Start: 2018-06-03 | End: 2018-06-04

## 2018-06-03 RX ORDER — ONDANSETRON 8 MG/1
8 TABLET, ORALLY DISINTEGRATING ORAL EVERY 8 HOURS PRN
Status: DISCONTINUED | OUTPATIENT
Start: 2018-06-03 | End: 2018-06-09 | Stop reason: HOSPADM

## 2018-06-03 RX ORDER — INSULIN ASPART 100 [IU]/ML
5 INJECTION, SOLUTION INTRAVENOUS; SUBCUTANEOUS
Status: DISCONTINUED | OUTPATIENT
Start: 2018-06-04 | End: 2018-06-09 | Stop reason: HOSPADM

## 2018-06-03 RX ORDER — IBUPROFEN 200 MG
24 TABLET ORAL
Status: DISCONTINUED | OUTPATIENT
Start: 2018-06-03 | End: 2018-06-09 | Stop reason: HOSPADM

## 2018-06-03 RX ORDER — VANCOMYCIN HCL IN 5 % DEXTROSE 1G/250ML
1000 PLASTIC BAG, INJECTION (ML) INTRAVENOUS
Status: DISCONTINUED | OUTPATIENT
Start: 2018-06-03 | End: 2018-06-05

## 2018-06-03 RX ORDER — IBUPROFEN 200 MG
16 TABLET ORAL
Status: DISCONTINUED | OUTPATIENT
Start: 2018-06-03 | End: 2018-06-09 | Stop reason: HOSPADM

## 2018-06-03 RX ORDER — SODIUM CHLORIDE 9 MG/ML
INJECTION, SOLUTION INTRAVENOUS CONTINUOUS
Status: ACTIVE | OUTPATIENT
Start: 2018-06-03 | End: 2018-06-04

## 2018-06-03 RX ORDER — FLUTICASONE FUROATE AND VILANTEROL 100; 25 UG/1; UG/1
1 POWDER RESPIRATORY (INHALATION) DAILY
Status: DISCONTINUED | OUTPATIENT
Start: 2018-06-04 | End: 2018-06-09 | Stop reason: HOSPADM

## 2018-06-03 RX ORDER — GABAPENTIN 300 MG/1
300 CAPSULE ORAL NIGHTLY
Status: DISCONTINUED | OUTPATIENT
Start: 2018-06-03 | End: 2018-06-09 | Stop reason: HOSPADM

## 2018-06-03 RX ORDER — CARISOPRODOL 350 MG/1
350 TABLET ORAL 4 TIMES DAILY PRN
Status: ON HOLD | COMMUNITY
End: 2018-06-09 | Stop reason: HOSPADM

## 2018-06-03 RX ORDER — INSULIN ASPART 100 [IU]/ML
1-10 INJECTION, SOLUTION INTRAVENOUS; SUBCUTANEOUS
Status: DISCONTINUED | OUTPATIENT
Start: 2018-06-03 | End: 2018-06-03

## 2018-06-03 RX ORDER — QUETIAPINE FUMARATE 100 MG/1
100 TABLET, FILM COATED ORAL DAILY
Status: DISCONTINUED | OUTPATIENT
Start: 2018-06-04 | End: 2018-06-09 | Stop reason: HOSPADM

## 2018-06-03 RX ORDER — SERTRALINE HYDROCHLORIDE 50 MG/1
100 TABLET, FILM COATED ORAL DAILY
Status: DISCONTINUED | OUTPATIENT
Start: 2018-06-04 | End: 2018-06-09 | Stop reason: HOSPADM

## 2018-06-03 RX ORDER — IBUPROFEN 200 MG
1 TABLET ORAL DAILY
Status: DISCONTINUED | OUTPATIENT
Start: 2018-06-04 | End: 2018-06-09 | Stop reason: HOSPADM

## 2018-06-03 RX ORDER — ATORVASTATIN CALCIUM 10 MG/1
20 TABLET, FILM COATED ORAL NIGHTLY
Status: DISCONTINUED | OUTPATIENT
Start: 2018-06-03 | End: 2018-06-09 | Stop reason: HOSPADM

## 2018-06-03 RX ORDER — ACETAMINOPHEN 500 MG
500 TABLET ORAL EVERY 6 HOURS PRN
Status: DISCONTINUED | OUTPATIENT
Start: 2018-06-03 | End: 2018-06-09 | Stop reason: HOSPADM

## 2018-06-03 RX ADMIN — SODIUM CHLORIDE 1000 ML: 0.9 INJECTION, SOLUTION INTRAVENOUS at 02:06

## 2018-06-03 RX ADMIN — GABAPENTIN 300 MG: 300 CAPSULE ORAL at 09:06

## 2018-06-03 RX ADMIN — MORPHINE SULFATE 10 MG: 8 INJECTION INTRAVENOUS at 01:06

## 2018-06-03 RX ADMIN — DIAZEPAM 10 MG: 5 TABLET ORAL at 01:06

## 2018-06-03 RX ADMIN — VANCOMYCIN HYDROCHLORIDE 1000 MG: 1 INJECTION, POWDER, LYOPHILIZED, FOR SOLUTION INTRAVENOUS at 09:06

## 2018-06-03 RX ADMIN — PIPERACILLIN SODIUM AND TAZOBACTAM SODIUM 4.5 G: 4; .5 INJECTION, POWDER, LYOPHILIZED, FOR SOLUTION INTRAVENOUS at 11:06

## 2018-06-03 RX ADMIN — INSULIN DETEMIR 20 UNITS: 100 INJECTION, SOLUTION SUBCUTANEOUS at 09:06

## 2018-06-03 RX ADMIN — INSULIN ASPART 2 UNITS: 100 INJECTION, SOLUTION INTRAVENOUS; SUBCUTANEOUS at 09:06

## 2018-06-03 RX ADMIN — VANCOMYCIN HYDROCHLORIDE 1000 MG: 1 INJECTION, POWDER, LYOPHILIZED, FOR SOLUTION INTRAVENOUS at 03:06

## 2018-06-03 RX ADMIN — SODIUM CHLORIDE: 0.9 INJECTION, SOLUTION INTRAVENOUS at 11:06

## 2018-06-03 RX ADMIN — CARVEDILOL 25 MG: 6.25 TABLET, FILM COATED ORAL at 09:06

## 2018-06-03 RX ADMIN — OXYCODONE HYDROCHLORIDE AND ACETAMINOPHEN 1 TABLET: 10; 325 TABLET ORAL at 07:06

## 2018-06-03 RX ADMIN — PIPERACILLIN AND TAZOBACTAM 4.5 G: 4; .5 INJECTION, POWDER, LYOPHILIZED, FOR SOLUTION INTRAVENOUS; PARENTERAL at 03:06

## 2018-06-03 RX ADMIN — ATORVASTATIN CALCIUM 20 MG: 10 TABLET, FILM COATED ORAL at 09:06

## 2018-06-03 RX ADMIN — RAMELTEON 8 MG: 8 TABLET, FILM COATED ORAL at 09:06

## 2018-06-03 RX ADMIN — HEPARIN SODIUM 5000 UNITS: 5000 INJECTION, SOLUTION INTRAVENOUS; SUBCUTANEOUS at 09:06

## 2018-06-03 NOTE — ED PROVIDER NOTES
Encounter Date: 6/3/2018       History     Chief Complaint   Patient presents with    Hip Pain     pt presents to ED with c/o rectal bleeding that started this morning and hip pain that has been ongoing. Denies any N/V at this time.     Rectal Bleeding     Chief complaint:  Pelvic pain  67-year-old says that she has severe pain to her pelvis.  This has been ongoing for 2 weeks and is getting worse.  This pain occurs only when she walks.  She describes the pain as sharp.  Patient says she saw her pain management doctor 4 days ago and was given a steroid shot without improvement.  The pain radiates down the left side of her leg.  Patient says she has a history of sciatica and this feels different.  She denies incontinence.  Patient takes OxyContin, Percocet and Soma for the pain and says that this is not helping.  Patient has also had bright red blood per rectum once today.  She admits that she is constipated.  She denies rectal pain.  No abdominal pain.  It she rates her pain as 10/10 in her pelvis.      The history is provided by the patient and medical records.     Review of patient's allergies indicates:  No Known Allergies  Past Medical History:   Diagnosis Date    Anxiety     Arthritis     Asthma     Bronchiectasis with acute exacerbation     Bronchitis     Chronic pain     right knee    Coronary artery disease involving native coronary artery of native heart without angina pectoris 1/10/2018    Essential hypertension, benign 11/21/2012    Hypotension, iatrogenic     Insomnia     Knee pain, right     chronic    Mitral valve prolapse     Obstructive chronic bronchitis without exacerbation 11/21/2012    Type II or unspecified type diabetes mellitus without mention of complication, uncontrolled     Type II or unspecified type diabetes mellitus without mention of complication, uncontrolled      Past Surgical History:   Procedure Laterality Date    bowel reconstruction      BREAST SURGERY       CHOLECYSTECTOMY      HYSTERECTOMY      KNEE SURGERY      R, surgeries x 7    Right Leg reconstruction surgery      Rotator Cuff Surgery      TONSILLECTOMY      WRIST SURGERY       Family History   Problem Relation Age of Onset    Heart disease Sister     Heart disease Brother     Hypertension Mother      Social History   Substance Use Topics    Smoking status: Current Every Day Smoker     Packs/day: 0.25     Years: 45.00     Types: Cigarettes    Smokeless tobacco: Never Used    Alcohol use No     Review of Systems   Constitutional: Negative for fever.   HENT: Negative for sore throat.    Respiratory: Negative for shortness of breath.    Cardiovascular: Negative for chest pain.   Gastrointestinal: Positive for blood in stool and constipation. Negative for nausea and rectal pain.   Genitourinary: Positive for pelvic pain. Negative for dysuria.   Musculoskeletal: Positive for back pain.   Skin: Negative for rash.   Neurological: Negative for weakness.   Hematological: Does not bruise/bleed easily.       Physical Exam     Initial Vitals [06/03/18 1214]   BP Pulse Resp Temp SpO2   (!) 100/57 107 18 98 °F (36.7 °C) --      MAP       71.33         Physical Exam    Nursing note and vitals reviewed.  Constitutional: She appears well-developed and well-nourished. She is Obese .   HENT:   Head: Normocephalic and atraumatic.   Eyes: Conjunctivae and EOM are normal. Pupils are equal, round, and reactive to light.   Neck: Normal range of motion. Neck supple.   Cardiovascular: Normal rate and regular rhythm.   Pulmonary/Chest: Breath sounds normal.   Abdominal: Soft. There is tenderness in the suprapubic area. There is no rebound and no guarding.   Genitourinary: Rectal exam shows guaiac positive stool. Guaiac positive stool. : Acceptable.  Genitourinary Comments: External hemorrhoid without tenderness   Musculoskeletal: Normal range of motion.   Neurological: She is alert and oriented to person,  "place, and time. She has normal strength.   Skin: Skin is warm and dry.   Psychiatric: She has a normal mood and affect.         ED Course   Critical Care  Date/Time: 6/3/2018 4:13 PM  Performed by: MCKAYLA HERNANDEZ.  Authorized by: MCKAYLA HERNANDEZ   Direct patient critical care time: 30 minutes  Ordering / reviewing critical care time: 8 minutes  Documentation critical care time: 10 minutes  Consulting other physicians critical care time: 8 minutes  Total critical care time (exclusive of procedural time) : 56 minutes  Critical care was necessary to treat or prevent imminent or life-threatening deterioration of the following conditions: sepsis.  Critical care was time spent personally by me on the following activities: discussions with consultants, development of treatment plan with patient or surrogate, examination of patient, ordering and performing treatments and interventions, ordering and review of radiographic studies, re-evaluation of patient's condition, ordering and review of laboratory studies, obtaining history from patient or surrogate, evaluation of patient's response to treatment, pulse oximetry and review of old charts.        Labs Reviewed   POCT OCCULT BLOOD STOOL - Abnormal; Notable for the following:        Result Value    Fecal Occult Blood Positive (*)     All other components within normal limits   POCT GLUCOSE - Abnormal; Notable for the following:     POCT Glucose 322 (*)     All other components within normal limits   POCT URINALYSIS W/O SCOPE   POCT GLUCOSE   POCT CBC   POCT OCCULT BLOOD STOOL   POCT CMP     EKG Readings: (Independently Interpreted)   Normal sinus rhythm with heart rate 95, no ST segment elevation, normal QT, normal P your, normal axis, occasional PVC          Medical Decision Making:   Initial Assessment:   67-year-old with history of chronic pain presents with "pelvic pain".  Patient has been taking her chronic pain medicines without improvement.  On exam patient has " no neurological deficits.  She is heme-positive on exam and has an external hemorrhoid that is nontender.  ED Management:  CT of the abdomen and pelvis without contrast will be done.  Patient will be given IM morphine and Valium.  CT does show induration to the mons pubis area without definite fluid collection.  This is where the patient is tender.  She has an elevated white blood cell count at 20,000.  Patient was heme positive. Hematocrit is 29.  Last known hematocrit was 30 for as an in January.  Chart review shows that the patient has had elevated white blood cell count in the past as well.   the patient requires admission for further evaluation of the lower abdominal pain with the induration and the elevated white blood cell count.  Blood cultures will be sent.  Also patient has heme-positive stools with slight anemia.    I discussed patient with Dr. Eric Reyes and  she would like to the patient to be treated for sepsis.  IV Zosyn and vancomycin was ordered.  However Dr. Eric Reyes and I discuss IV fluids.  Patient will be given 1 L bolus.  She is nontoxic appearing                      Clinical Impression:   The encounter diagnosis was Lower abdominal pain.    CT Renal Stone Study ABD Pelvis WO   Final Result      1. Stable prominence of the common duct, without intraluminal filling defect.  ERCP as warranted.   2. Multiple colonic diverticula without findings to suggest diverticulitis.   3. Soft tissue induration in the region of the mons, nonspecific, correlation with any focal tenderness or superficial cellulitis as warranted.   4. Several additional findings above.         Electronically signed by: Gopi Galindo MD   Date:    06/03/2018   Time:    13:35                                 Liz Duffy MD  06/03/18 1431       Liz Duffy MD  06/03/18 1615

## 2018-06-04 PROBLEM — D62 ANEMIA ASSOCIATED WITH ACUTE BLOOD LOSS: Status: ACTIVE | Noted: 2018-06-03

## 2018-06-04 LAB
ALBUMIN SERPL BCP-MCNC: 2.5 G/DL
ALP SERPL-CCNC: 364 U/L
ALT SERPL W/O P-5'-P-CCNC: 42 U/L
ANION GAP SERPL CALC-SCNC: 7 MMOL/L
AST SERPL-CCNC: 33 U/L
BASOPHILS # BLD AUTO: 0.01 K/UL
BASOPHILS NFR BLD: 0.1 %
BILIRUB SERPL-MCNC: 0.4 MG/DL
BILIRUB UR QL STRIP: NEGATIVE
BUN SERPL-MCNC: 18 MG/DL
CALCIUM SERPL-MCNC: 8.5 MG/DL
CHLORIDE SERPL-SCNC: 107 MMOL/L
CLARITY UR: CLEAR
CO2 SERPL-SCNC: 29 MMOL/L
COLOR UR: YELLOW
CREAT SERPL-MCNC: 1.3 MG/DL
CREAT UR-MCNC: 118.1 MG/DL
DIFFERENTIAL METHOD: ABNORMAL
EOSINOPHIL # BLD AUTO: 0.1 K/UL
EOSINOPHIL NFR BLD: 1 %
ERYTHROCYTE [DISTWIDTH] IN BLOOD BY AUTOMATED COUNT: 14.9 %
EST. GFR  (AFRICAN AMERICAN): 49 ML/MIN/1.73 M^2
EST. GFR  (NON AFRICAN AMERICAN): 42 ML/MIN/1.73 M^2
ESTIMATED AVG GLUCOSE: 217 MG/DL
GLUCOSE SERPL-MCNC: 163 MG/DL
GLUCOSE UR QL STRIP: NEGATIVE
HBA1C MFR BLD HPLC: 9.2 %
HCT VFR BLD AUTO: 24.6 %
HGB BLD-MCNC: 7.9 G/DL
HGB UR QL STRIP: NEGATIVE
KETONES UR QL STRIP: NEGATIVE
LACTATE SERPL-SCNC: 0.8 MMOL/L
LEUKOCYTE ESTERASE UR QL STRIP: NEGATIVE
LYMPHOCYTES # BLD AUTO: 2.5 K/UL
LYMPHOCYTES NFR BLD: 17.4 %
MAGNESIUM SERPL-MCNC: 1.9 MG/DL
MCH RBC QN AUTO: 26.2 PG
MCHC RBC AUTO-ENTMCNC: 32.1 G/DL
MCV RBC AUTO: 82 FL
MICROSCOPIC COMMENT: ABNORMAL
MONOCYTES # BLD AUTO: 0.9 K/UL
MONOCYTES NFR BLD: 6 %
NEUTROPHILS # BLD AUTO: 10.7 K/UL
NEUTROPHILS NFR BLD: 75.5 %
NITRITE UR QL STRIP: NEGATIVE
PH UR STRIP: 5 [PH] (ref 5–8)
PHOSPHATE SERPL-MCNC: 4.2 MG/DL
PLATELET # BLD AUTO: 326 K/UL
PMV BLD AUTO: 9.2 FL
POCT GLUCOSE: 137 MG/DL (ref 70–110)
POCT GLUCOSE: 138 MG/DL (ref 70–110)
POCT GLUCOSE: 207 MG/DL (ref 70–110)
POCT GLUCOSE: 274 MG/DL (ref 70–110)
POTASSIUM SERPL-SCNC: 4.6 MMOL/L
PROT SERPL-MCNC: 5.7 G/DL
PROT UR QL STRIP: NEGATIVE
RBC # BLD AUTO: 3.02 M/UL
RBC #/AREA URNS HPF: 0 /HPF (ref 0–4)
SODIUM SERPL-SCNC: 143 MMOL/L
SODIUM UR-SCNC: 46 MMOL/L
SP GR UR STRIP: 1.02 (ref 1–1.03)
SQUAMOUS #/AREA URNS HPF: 5 /HPF
URATE CRY URNS QL MICRO: ABNORMAL
URN SPEC COLLECT METH UR: NORMAL
UROBILINOGEN UR STRIP-ACNC: NEGATIVE EU/DL
WBC # BLD AUTO: 14.11 K/UL
WBC #/AREA URNS HPF: 0 /HPF (ref 0–5)
YEAST URNS QL MICRO: ABNORMAL

## 2018-06-04 PROCEDURE — 99900035 HC TECH TIME PER 15 MIN (STAT)

## 2018-06-04 PROCEDURE — 25000003 PHARM REV CODE 250: Performed by: INTERNAL MEDICINE

## 2018-06-04 PROCEDURE — 80053 COMPREHEN METABOLIC PANEL: CPT

## 2018-06-04 PROCEDURE — 36415 COLL VENOUS BLD VENIPUNCTURE: CPT

## 2018-06-04 PROCEDURE — 63600175 PHARM REV CODE 636 W HCPCS: Performed by: INTERNAL MEDICINE

## 2018-06-04 PROCEDURE — 85025 COMPLETE CBC W/AUTO DIFF WBC: CPT

## 2018-06-04 PROCEDURE — 11000001 HC ACUTE MED/SURG PRIVATE ROOM

## 2018-06-04 PROCEDURE — 84100 ASSAY OF PHOSPHORUS: CPT

## 2018-06-04 PROCEDURE — 87086 URINE CULTURE/COLONY COUNT: CPT

## 2018-06-04 PROCEDURE — 94761 N-INVAS EAR/PLS OXIMETRY MLT: CPT

## 2018-06-04 PROCEDURE — 25000003 PHARM REV CODE 250: Performed by: EMERGENCY MEDICINE

## 2018-06-04 PROCEDURE — 63600175 PHARM REV CODE 636 W HCPCS: Performed by: EMERGENCY MEDICINE

## 2018-06-04 PROCEDURE — 81000 URINALYSIS NONAUTO W/SCOPE: CPT

## 2018-06-04 PROCEDURE — 82570 ASSAY OF URINE CREATININE: CPT

## 2018-06-04 PROCEDURE — S4991 NICOTINE PATCH NONLEGEND: HCPCS | Performed by: INTERNAL MEDICINE

## 2018-06-04 PROCEDURE — 83605 ASSAY OF LACTIC ACID: CPT

## 2018-06-04 PROCEDURE — 83735 ASSAY OF MAGNESIUM: CPT

## 2018-06-04 PROCEDURE — 25000242 PHARM REV CODE 250 ALT 637 W/ HCPCS: Performed by: INTERNAL MEDICINE

## 2018-06-04 PROCEDURE — 94640 AIRWAY INHALATION TREATMENT: CPT

## 2018-06-04 PROCEDURE — 83036 HEMOGLOBIN GLYCOSYLATED A1C: CPT

## 2018-06-04 PROCEDURE — 84300 ASSAY OF URINE SODIUM: CPT

## 2018-06-04 RX ORDER — CARVEDILOL 3.12 MG/1
3.12 TABLET ORAL 2 TIMES DAILY
Status: DISCONTINUED | OUTPATIENT
Start: 2018-06-04 | End: 2018-06-04

## 2018-06-04 RX ADMIN — PIPERACILLIN SODIUM AND TAZOBACTAM SODIUM 4.5 G: 4; .5 INJECTION, POWDER, LYOPHILIZED, FOR SOLUTION INTRAVENOUS at 03:06

## 2018-06-04 RX ADMIN — INSULIN DETEMIR 20 UNITS: 100 INJECTION, SOLUTION SUBCUTANEOUS at 10:06

## 2018-06-04 RX ADMIN — SERTRALINE HYDROCHLORIDE 100 MG: 50 TABLET ORAL at 08:06

## 2018-06-04 RX ADMIN — PANTOPRAZOLE SODIUM 40 MG: 40 TABLET, DELAYED RELEASE ORAL at 08:06

## 2018-06-04 RX ADMIN — INSULIN ASPART 2 UNITS: 100 INJECTION, SOLUTION INTRAVENOUS; SUBCUTANEOUS at 10:06

## 2018-06-04 RX ADMIN — OXYCODONE HYDROCHLORIDE AND ACETAMINOPHEN 1 TABLET: 10; 325 TABLET ORAL at 08:06

## 2018-06-04 RX ADMIN — PIPERACILLIN SODIUM AND TAZOBACTAM SODIUM 4.5 G: 4; .5 INJECTION, POWDER, LYOPHILIZED, FOR SOLUTION INTRAVENOUS at 06:06

## 2018-06-04 RX ADMIN — OXYCODONE HYDROCHLORIDE AND ACETAMINOPHEN 1 TABLET: 10; 325 TABLET ORAL at 04:06

## 2018-06-04 RX ADMIN — CARVEDILOL 25 MG: 6.25 TABLET, FILM COATED ORAL at 08:06

## 2018-06-04 RX ADMIN — OXYCODONE HYDROCHLORIDE AND ACETAMINOPHEN 1 TABLET: 10; 325 TABLET ORAL at 12:06

## 2018-06-04 RX ADMIN — INSULIN ASPART 5 UNITS: 100 INJECTION, SOLUTION INTRAVENOUS; SUBCUTANEOUS at 11:06

## 2018-06-04 RX ADMIN — FLUTICASONE FUROATE AND VILANTEROL TRIFENATATE 1 PUFF: 100; 25 POWDER RESPIRATORY (INHALATION) at 11:06

## 2018-06-04 RX ADMIN — QUETIAPINE FUMARATE 100 MG: 100 TABLET ORAL at 08:06

## 2018-06-04 RX ADMIN — PIPERACILLIN SODIUM AND TAZOBACTAM SODIUM 4.5 G: 4; .5 INJECTION, POWDER, LYOPHILIZED, FOR SOLUTION INTRAVENOUS at 10:06

## 2018-06-04 RX ADMIN — INSULIN ASPART 6 UNITS: 100 INJECTION, SOLUTION INTRAVENOUS; SUBCUTANEOUS at 11:06

## 2018-06-04 RX ADMIN — OXYCODONE HYDROCHLORIDE AND ACETAMINOPHEN 1 TABLET: 10; 325 TABLET ORAL at 11:06

## 2018-06-04 RX ADMIN — OXYCODONE HYDROCHLORIDE AND ACETAMINOPHEN 1 TABLET: 10; 325 TABLET ORAL at 07:06

## 2018-06-04 RX ADMIN — VANCOMYCIN HYDROCHLORIDE 1000 MG: 1 INJECTION, POWDER, LYOPHILIZED, FOR SOLUTION INTRAVENOUS at 07:06

## 2018-06-04 RX ADMIN — GABAPENTIN 300 MG: 300 CAPSULE ORAL at 10:06

## 2018-06-04 RX ADMIN — ASPIRIN 81 MG: 81 TABLET, COATED ORAL at 08:06

## 2018-06-04 RX ADMIN — INSULIN ASPART 5 UNITS: 100 INJECTION, SOLUTION INTRAVENOUS; SUBCUTANEOUS at 04:06

## 2018-06-04 RX ADMIN — HEPARIN SODIUM 5000 UNITS: 5000 INJECTION, SOLUTION INTRAVENOUS; SUBCUTANEOUS at 08:06

## 2018-06-04 RX ADMIN — ATORVASTATIN CALCIUM 20 MG: 10 TABLET, FILM COATED ORAL at 10:06

## 2018-06-04 RX ADMIN — INSULIN ASPART 5 UNITS: 100 INJECTION, SOLUTION INTRAVENOUS; SUBCUTANEOUS at 07:06

## 2018-06-04 RX ADMIN — OXYCODONE HYDROCHLORIDE AND ACETAMINOPHEN 1 TABLET: 10; 325 TABLET ORAL at 03:06

## 2018-06-04 RX ADMIN — RAMELTEON 8 MG: 8 TABLET, FILM COATED ORAL at 11:06

## 2018-06-04 NOTE — ASSESSMENT & PLAN NOTE
Clinically-stable without wheezing.  Will continue home regimen of ICS/LABA and have as-needed SEAN/LAMA available.

## 2018-06-04 NOTE — ASSESSMENT & PLAN NOTE
Stable without evidence of acute heart failure; will continue her home regimen of carvedilol and restart lisinopril when her renal function improves.

## 2018-06-04 NOTE — PLAN OF CARE
Problem: Patient Care Overview  Goal: Plan of Care Review  Outcome: Ongoing (interventions implemented as appropriate)  Absence of falls noted at present. SR up x's2. Fall risk protocol IP. Pt reminded to call for assistance prior to getting OOB. Verbalized understanding. BSC within reach .Toilting scheduled. Call bell within reach .Will cont to monitor.

## 2018-06-04 NOTE — ASSESSMENT & PLAN NOTE
With reported bright red blood per rectum prior to admission. None today but Hgb is 7.9 g/dL which is lower than baseline. No upper GI symptoms. Will consult GI. Hold heparin and aspirin. CBC in AM

## 2018-06-04 NOTE — ASSESSMENT & PLAN NOTE
Patient's urinalysis is significant for a specific gravity of 1.015 but otherwise benign.  Urine output has been fair.  Will obtain additional urine studies; provide aggressive IV fluid hydration; monitor the urine output; recheck the renal function in the morning; and avoid nephrotoxins.

## 2018-06-04 NOTE — ASSESSMENT & PLAN NOTE
This patient meets criteria for sepsis given tachycardia, leukocytosis, and cellulitis.  Blood and urine cultures are pending; will start IV fluid hydration and broad spectrum antibiotics.

## 2018-06-04 NOTE — ASSESSMENT & PLAN NOTE
"Patient has been having pubic pain for the past two weeks and a CT-renal study was revealing for "[s]oft tissue induration in the region of the mons...".  She does meet criteria for severe sepsis and has been started empirically on vancomycin and piperacillin/tazobactam.  Her lactic acid is normal at this time and she is otherwise hemodynamically-stable.  Blood and urine cultures are pending.  There was no evidence of abscess.   "

## 2018-06-04 NOTE — PLAN OF CARE
"SW met with patient to compete discharge needs assessment. SW reviewed with patient contents of "[facility]" including "help at home", "things patient responsible for to manage her health at home" and "preferences". Patient was able to verbalize her help at home will be her daughter Sita and how she was managing her health at home was by going on her doctor appointments and taking medications. Patient prefers afternoon doctor appointments. JENNIFER wrote name and phone number on white communication board.        [facility] - [place], [place] - [address]  [address] [facility] [address]  [facility] [address]  Phone: [phone] Fax: [phone]           06/04/18 1440   Discharge Assessment   Assessment Type Discharge Planning Assessment   Confirmed/corrected address and phone number on facesheet? Yes   Assessment information obtained from? Patient   Communicated expected length of stay with patient/caregiver no   Prior to hospitilization cognitive status: Alert/Oriented;No Deficits   Prior to hospitalization functional status: Independent;Assistive Equipment   Current cognitive status: (Somewhat lethargic)   Current Functional Status: Assistive Equipment;Needs Assistance   Facility Arrived From: Home   Lives With alone   Able to Return to Prior Arrangements yes   Is patient able to care for self after discharge? Yes   Who are your caregiver(s) and their phone number(s)? Sita [phone] daughter   Patient's perception of discharge disposition home or selfcare   Readmission Within The Last 30 Days no previous admission in last 30 days   Patient currently being followed by outpatient case management? No   Patient currently receives any other outside agency services? No   Equipment Currently Used at Home cane, straight;rollator;glucometer   Do you have any problems affording any of your prescribed medications? No   Is the patient taking medications as prescribed? " yes   Does the patient have transportation home? Yes   Transportation Available car;family or friend will provide   Does the patient receive services at the [facility]? No   Discharge Plan A Home  (PCP F/U)   Discharge Plan B Home   Patient/Family In Agreement With Plan yes

## 2018-06-04 NOTE — HOSPITAL COURSE
Ms. Land presented with sepsis secondary to suprapubic induration without evidence of abscess on physical exam or CT or any erythema. She was hypotensive and with a lot of pain. She also reported bright red blood per rectum. She does have external hemorrhoids. Blood did not recur while inpatient but Hgb was 7.9 g/dL on presentation. She was started on pip-tazo and vanc for sepsis. Cultures NGTD. GI consulted for blood per rectum and anemia. Her H&H was stable and GI recommended outpatient work up once cellulitis resolved as she does not feel she could tolerate a prep at this time. She continued to complain of pain despite appearing clinically stable. Antibiotics were changed to clindamycin 6/6/2018. CT A/P ordered 6/7 for persistent pain but showed decreased swelling and induration. Ob/gyn also consulted as patient still complaining of significant pubic pain. Pelvic exam by Ob/Gyn was unremarkable. They felt there was nothing to add to her current course of treatment but she was given their card in the event symptoms returned. She was stable for discharge home with home health to complete a course of antibiotics with clindamycin PO.

## 2018-06-04 NOTE — PROGRESS NOTES
[facility] Hospital Medicine  Progress Note    Patient Name: Raissa Land  MRN: [id number]  Patient Class: IP- Inpatient   Admission Date: 6/3/2018  Length of Stay: 1 days  Attending Physician: Sydni Deras MD  Primary Care Provider: Guerrero Hensley Jr, MD        Subjective:     Principal Problem:Cellulitis of pubic region    HPI:  Ms. Raissa Land is a 67 y.o. female with essential hypertension, hyperlipidemia (.6 Jan 2018), type 2 diabetes mellitus (HbA1c 7.0% Jan 2018), CAD, chronic combined systolic and diastolic heart failure (LVEF 35-40% Jan 2018), COPD, anemia of chronic disease, GERD, tobacco abuse, and depression who presented initially to [facility] ED with complaints of pelvic pain for two weeks.  She reports that it's been making it very difficult for her to walk without a walk due to the pain.  The pain is localized to the suprapubic area but feels a little deeper.  She did not have any recent trauma to the area.  She denies any fevers, chills, nausea, vomiting, abdominal pain, diarrhea, dysuria, nor any hematuria.  She otherwise has been feeling well with a normal appetite.    Hospital Course:  Ms Land presented with sepsis secondary to suprapubic induration without evidence of abscess or any underlying erythema. Hypotensive and with a lot of pain. Also reported bright red blood per rectum. She does have external hemorrhoids. Blood did not recur while inpatient but Hgb was 7.9 g/dL. Started on pip-tazo and vanc for sepsis. GI consulted for blood per rectum and anemia    Interval History: with suprapubic pain at area of lesion. No reports of blood per rectum today but Hgb is low at 7.9. BP low this AM.     Review of Systems   Constitutional: Negative for activity change, appetite change, chills, diaphoresis, fatigue, fever and unexpected weight change.   HENT: Negative.    Eyes: Negative.    Respiratory: Negative for cough, chest tightness, shortness of breath and wheezing.     Cardiovascular: Negative for chest pain, palpitations and leg swelling.   Gastrointestinal: Negative for abdominal distention, abdominal pain, blood in stool, constipation, diarrhea, nausea and vomiting.   Genitourinary: Negative for dysuria and hematuria.        Suprapubic pain   Musculoskeletal: Negative.    Skin: Negative.    Neurological: Negative for dizziness, seizures, syncope, weakness and light-headedness.   Psychiatric/Behavioral: Negative.      Objective:     Vital Signs (Most Recent):  Temp: 98.5 °F (36.9 °C) (06/04/18 1623)  Pulse: 72 (06/04/18 1623)  Resp: 19 (06/04/18 1623)  BP: (!) 125/56 (06/04/18 1623)  SpO2: (!) 90 % (06/04/18 1623) Vital Signs (24h Range):  Temp:  [98 °F (36.7 °C)-99.2 °F (37.3 °C)] 98.5 °F (36.9 °C)  Pulse:  [64-89] 72  Resp:  [17-19] 19  SpO2:  [90 %-97 %] 90 %  BP: ()/(50-62) 125/56     Weight: 86.2 kg (190 lb)  Body mass index is 37.11 kg/m².    Intake/Output Summary (Last 24 hours) at 06/04/18 1647  Last data filed at 06/04/18 1520   Gross per 24 hour   Intake             1544 ml   Output              800 ml   Net              744 ml      Physical Exam   Constitutional: She is oriented to person, place, and time. She appears well-developed. No distress.   HENT:   Head: Normocephalic and atraumatic.   Right Ear: External ear normal.   Left Ear: External ear normal.   Nose: Nose normal.   Eyes: Right eye exhibits no discharge. Left eye exhibits no discharge.   Neck: Normal range of motion.   Cardiovascular: Normal rate, regular rhythm, normal heart sounds and intact distal pulses.  Exam reveals no gallop and no friction rub.    No murmur heard.  Pulmonary/Chest: Effort normal and breath sounds normal. No respiratory distress. She has no wheezes. She has no rales. She exhibits no tenderness.   Abdominal: Soft. Bowel sounds are normal. She exhibits no distension. There is no tenderness. There is no rebound and no guarding.   Genitourinary:   Genitourinary Comments: Mild  "tenderness to palpation to the supra pubic area with some induration, no fluctuance. No erythema   Musculoskeletal: Normal range of motion. She exhibits no edema.   Neurological: She is alert and oriented to person, place, and time.   Skin: Skin is warm and dry. She is not diaphoretic. No erythema.   Psychiatric: She has a normal mood and affect. Her behavior is normal. Judgment and thought content normal.   Nursing note and vitals reviewed.      Significant Labs: All pertinent labs within the past 24 hours have been reviewed.    Significant Imaging: I have reviewed all pertinent imaging results/findings within the past 24 hours.  I have reviewed and interpreted all pertinent imaging results/findings within the past 24 hours.    Assessment/Plan:      * Cellulitis of pubic region    Patient has been having pubic pain for the past two weeks and a CT-renal study was revealing for "[s]oft tissue induration in the region of the mons...".  She does meet criteria for severe sepsis and has been started empirically on vancomycin and piperacillin/tazobactam.  Her lactic acid is normal at this time. Hold antihypertensives for low BP today.  Blood and urine cultures are pending.  There was no evidence of abscess.         Coronary artery disease involving native coronary artery of native heart without angina pectoris    Stable; will continue her home regimen of atorvastatin. Hold antihypertensives due to low BP        Chronic combined systolic and diastolic heart failure    Stable without evidence of acute heart failure        Acute renal failure    Better with fluids        Depression    Stable; will continue her home regimen of sertraline.        GERD (gastroesophageal reflux disease)    Will continue her home regimen of pantoprazole.        Anemia associated with acute blood loss    With reported bright red blood per rectum prior to admission. None today but Hgb is 7.9 g/dL which is lower than baseline. No upper GI symptoms. " Will consult GI. Hold heparin and aspirin. CBC in AM        Hyperlipidemia    Poorly-controlled; will continue patient's home regimen of atorvastatin.        Severe sepsis    This patient meets criteria for sepsis given tachycardia, leukocytosis, and cellulitis.  Blood and urine cultures are pending; will start IV fluid hydration and broad spectrum antibiotics.        Tobacco abuse    Patient was counseled on smoking cessation and she will be provided a nicotine transdermal patch applied while inpatient.  Will provide additional smoking cessation counseling prior to discharge.        Type 2 diabetes mellitus, controlled    Well-controlled on a home regimen of basal-prandial insulin therapy; will provide basal-prandial insulin along with insulin sliding scale.        COPD (chronic obstructive pulmonary disease)    Clinically-stable without wheezing.  Will continue home regimen of ICS/LABA and have as-needed SEAN/LAMA available.        Essential hypertension, benign    Patient's blood pressure is well-controlled at home but is currently low. Hold antihypertensives          VTE Risk Mitigation         Ordered     Place GENARO hose  Until discontinued      06/04/18 1649     Place sequential compression device  Until discontinued      06/04/18 1649     IP VTE HIGH RISK PATIENT  Once      06/03/18 2110              Sydni Reyes MD  Department of Hospital Medicine   [facility]

## 2018-06-04 NOTE — ASSESSMENT & PLAN NOTE
Well-controlled on a home regimen of basal-prandial insulin therapy; will provide basal-prandial insulin along with insulin sliding scale.

## 2018-06-04 NOTE — ASSESSMENT & PLAN NOTE
"Patient has been having pubic pain for the past two weeks and a CT-renal study was revealing for "[s]oft tissue induration in the region of the mons...".  She does meet criteria for severe sepsis and has been started empirically on vancomycin and piperacillin/tazobactam.  Her lactic acid is normal at this time. Hold antihypertensives for low BP today.  Blood and urine cultures are pending.  There was no evidence of abscess.   "

## 2018-06-04 NOTE — SUBJECTIVE & OBJECTIVE
"Past Medical History:   Diagnosis Date    Anxiety     Arthritis     Asthma     Bronchiectasis with acute exacerbation     Bronchitis     Chronic pain     right knee    Coronary artery disease involving native coronary artery of native heart without angina pectoris 1/10/2018    Essential hypertension, benign 11/21/2012    Hypotension, iatrogenic     Insomnia     Knee pain, right     chronic    Mitral valve prolapse     Obstructive chronic bronchitis without exacerbation 11/21/2012    Type II or unspecified type diabetes mellitus without mention of complication, uncontrolled     Type II or unspecified type diabetes mellitus without mention of complication, uncontrolled        Past Surgical History:   Procedure Laterality Date    bowel reconstruction      BREAST SURGERY      CHOLECYSTECTOMY      HYSTERECTOMY      KNEE SURGERY      R, surgeries x 7    Right Leg reconstruction surgery      Rotator Cuff Surgery      TONSILLECTOMY      WRIST SURGERY         Review of patient's allergies indicates:  No Known Allergies    No current facility-administered medications on file prior to encounter.      Current Outpatient Prescriptions on File Prior to Encounter   Medication Sig    ADVAIR DISKUS 250-50 mcg/dose diskus inhaler     atorvastatin (LIPITOR) 20 MG tablet Take 1 tablet (20 mg total) by mouth every evening.    gabapentin (NEURONTIN) 600 MG tablet     LEVEMIR FLEXPEN 100 unit/mL (3 mL) InPn     lisinopril 10 MG tablet Take 4 tablets (40 mg total) by mouth once daily.    NOVOLOG FLEXPEN 100 unit/mL InPn     potassium chloride (MICRO-K) 10 MEQ CpSR Take 10 mEq by mouth once daily.      quetiapine (SEROQUEL) 100 MG Tab Take by mouth every evening.    SURE COMFORT PEN NEEDLE 31 x 3/16 " Ndle     trazodone (DESYREL) 100 MG tablet Take 100 mg by mouth every evening.    aspirin (ECOTRIN) 81 MG EC tablet Take 1 tablet (81 mg total) by mouth once daily.    carvedilol (COREG) 25 MG tablet Take 1 " tablet (25 mg total) by mouth 2 (two) times daily.    diclofenac sodium (VOLTAREN) 1 % Gel Apply 2 g topically 4 (four) times daily.    furosemide (LASIX) 20 MG tablet Take 1 tablet (20 mg total) by mouth once daily.    pantoprazole (PROTONIX) 40 MG tablet Take 1 tablet (40 mg total) by mouth once daily.    sertraline (ZOLOFT) 100 MG tablet      Family History     Problem Relation (Age of Onset)    Heart disease Sister, Brother    Hypertension Mother        Social History Main Topics    Smoking status: Current Every Day Smoker     Packs/day: 0.25     Years: 45.00     Types: Cigarettes    Smokeless tobacco: Never Used    Alcohol use No    Drug use: Yes     Types: Marijuana    Sexual activity: Not Currently     Partners: Male     Birth control/ protection: None     Review of Systems   Constitutional: Negative for activity change, appetite change, chills, diaphoresis, fatigue, fever and unexpected weight change.   HENT: Negative.    Eyes: Negative.    Respiratory: Negative for cough, chest tightness, shortness of breath and wheezing.    Cardiovascular: Negative for chest pain, palpitations and leg swelling.   Gastrointestinal: Negative for abdominal distention, abdominal pain, blood in stool, constipation, diarrhea, nausea and vomiting.   Genitourinary: Positive for pelvic pain. Negative for dysuria and hematuria.   Musculoskeletal: Negative.    Skin: Negative.    Neurological: Negative for dizziness, seizures, syncope, weakness and light-headedness.   Psychiatric/Behavioral: Negative.      Objective:     Vital Signs (Most Recent):  Temp: 98.4 °F (36.9 °C) (06/03/18 2040)  Pulse: 84 (06/03/18 2040)  Resp: 18 (06/03/18 2040)  BP: 131/62 (06/03/18 2040)  SpO2: 95 % (06/03/18 2040) Vital Signs (24h Range):  Temp:  [98 °F (36.7 °C)-98.4 °F (36.9 °C)] 98.4 °F (36.9 °C)  Pulse:  [] 84  Resp:  [17-18] 18  SpO2:  [93 %-98 %] 95 %  BP: ()/(49-62) 131/62     Weight: 86.2 kg (190 lb)  Body mass index is 37.11  kg/m².    Physical Exam   Constitutional: She is oriented to person, place, and time. She appears well-developed and well-nourished. No distress.   HENT:   Head: Normocephalic and atraumatic.   Right Ear: External ear normal.   Left Ear: External ear normal.   Nose: Nose normal.   Eyes: Right eye exhibits no discharge. Left eye exhibits no discharge.   Neck: Normal range of motion.   Cardiovascular: Normal rate, regular rhythm, normal heart sounds and intact distal pulses.  Exam reveals no gallop and no friction rub.    No murmur heard.  Pulmonary/Chest: Effort normal and breath sounds normal. No respiratory distress. She has no wheezes. She has no rales. She exhibits no tenderness.   Abdominal: Soft. Bowel sounds are normal. She exhibits no distension. There is no tenderness. There is no rebound and no guarding.   Genitourinary:   Genitourinary Comments: Mild tenderness to palpation to the supra pubic area with some induration, no fluctuance   Musculoskeletal: Normal range of motion. She exhibits no edema.   Neurological: She is alert and oriented to person, place, and time.   Skin: Skin is warm and dry. She is not diaphoretic. No erythema.   Psychiatric: She has a normal mood and affect. Her behavior is normal. Judgment and thought content normal.   Nursing note and vitals reviewed.          Significant Labs: All pertinent labs within the past 24 hours have been reviewed.    Significant Imaging: I have reviewed and interpreted all pertinent imaging results/findings within the past 24 hours.

## 2018-06-04 NOTE — HPI
Ms. Raissa Land is a 67 y.o. female with essential hypertension, hyperlipidemia (.6 Jan 2018), type 2 diabetes mellitus (HbA1c 7.0% Jan 2018), CAD, chronic combined systolic and diastolic heart failure (LVEF 35-40% Jan 2018), COPD, anemia of chronic disease, GERD, tobacco abuse, and depression who presented initially to [facility] ED with complaints of pelvic pain for two weeks.  She reports that it's been making it very difficult for her to walk without a walk due to the pain.  The pain is localized to the suprapubic area but feels a little deeper.  She did not have any recent trauma to the area.  She denies any fevers, chills, nausea, vomiting, abdominal pain, diarrhea, dysuria, nor any hematuria.  She otherwise has been feeling well with a normal appetite.

## 2018-06-04 NOTE — ASSESSMENT & PLAN NOTE
Stable; will continue her home regimen of atorvastatin and carvedilol, but hold her home regimen of lisinopril due to renal failure.

## 2018-06-04 NOTE — SUBJECTIVE & OBJECTIVE
Interval History: with suprapubic pain at area of lesion. No reports of blood per rectum today but Hgb is low at 7.9. BP low this AM.     Review of Systems   Constitutional: Negative for activity change, appetite change, chills, diaphoresis, fatigue, fever and unexpected weight change.   HENT: Negative.    Eyes: Negative.    Respiratory: Negative for cough, chest tightness, shortness of breath and wheezing.    Cardiovascular: Negative for chest pain, palpitations and leg swelling.   Gastrointestinal: Negative for abdominal distention, abdominal pain, blood in stool, constipation, diarrhea, nausea and vomiting.   Genitourinary: Negative for dysuria and hematuria.        Suprapubic pain   Musculoskeletal: Negative.    Skin: Negative.    Neurological: Negative for dizziness, seizures, syncope, weakness and light-headedness.   Psychiatric/Behavioral: Negative.      Objective:     Vital Signs (Most Recent):  Temp: 98.5 °F (36.9 °C) (06/04/18 1623)  Pulse: 72 (06/04/18 1623)  Resp: 19 (06/04/18 1623)  BP: (!) 125/56 (06/04/18 1623)  SpO2: (!) 90 % (06/04/18 1623) Vital Signs (24h Range):  Temp:  [98 °F (36.7 °C)-99.2 °F (37.3 °C)] 98.5 °F (36.9 °C)  Pulse:  [64-89] 72  Resp:  [17-19] 19  SpO2:  [90 %-97 %] 90 %  BP: ()/(50-62) 125/56     Weight: 86.2 kg (190 lb)  Body mass index is 37.11 kg/m².    Intake/Output Summary (Last 24 hours) at 06/04/18 1647  Last data filed at 06/04/18 1520   Gross per 24 hour   Intake             1544 ml   Output              800 ml   Net              744 ml      Physical Exam   Constitutional: She is oriented to person, place, and time. She appears well-developed. No distress.   HENT:   Head: Normocephalic and atraumatic.   Right Ear: External ear normal.   Left Ear: External ear normal.   Nose: Nose normal.   Eyes: Right eye exhibits no discharge. Left eye exhibits no discharge.   Neck: Normal range of motion.   Cardiovascular: Normal rate, regular rhythm, normal heart sounds and  intact distal pulses.  Exam reveals no gallop and no friction rub.    No murmur heard.  Pulmonary/Chest: Effort normal and breath sounds normal. No respiratory distress. She has no wheezes. She has no rales. She exhibits no tenderness.   Abdominal: Soft. Bowel sounds are normal. She exhibits no distension. There is no tenderness. There is no rebound and no guarding.   Genitourinary:   Genitourinary Comments: Mild tenderness to palpation to the supra pubic area with some induration, no fluctuance. No erythema   Musculoskeletal: Normal range of motion. She exhibits no edema.   Neurological: She is alert and oriented to person, place, and time.   Skin: Skin is warm and dry. She is not diaphoretic. No erythema.   Psychiatric: She has a normal mood and affect. Her behavior is normal. Judgment and thought content normal.   Nursing note and vitals reviewed.      Significant Labs: All pertinent labs within the past 24 hours have been reviewed.    Significant Imaging: I have reviewed all pertinent imaging results/findings within the past 24 hours.  I have reviewed and interpreted all pertinent imaging results/findings within the past 24 hours.

## 2018-06-04 NOTE — ASSESSMENT & PLAN NOTE
Patient's blood pressure is well-controlled; will continue home regimen of carvedilol, and provide as-needed clonidine.  Will hold her home regimen of furosemide and lisinopril due to renal failure.

## 2018-06-04 NOTE — MEDICAL/APP STUDENT
Subjective:       Patient ID: Raissa Land is a 67 y.o. female.    Chief Complaint: Hip Pain (pt presents to ED with c/o rectal bleeding that started this morning and hip pain that has been ongoing. Denies any N/V at this time. ) and Rectal Bleeding    Raissa Land is a 67 y.o. Female who presented with constant, generalized pelvic pain (10/10) worsening over the past 2 wks that radiates to her L hip and back. She denies ever having pain like this before. The pain worsens with any movement and is only minimally relieved with Percocet. She also reports profuse rectal bleeding (dark red, clots) when using the restroom this morning but denies any pain with bowel movement. She endorses chronic rectal prolapse with bowel movements that retract on their own. She has been unable to walk without her walker due to the pelvic pain. She denies nausea, vomiting, diarrhea, fever, dysuria, recent illness or sick contacts. She smokes cigarettes- 1/2ppd- but denies use of ETOH or other drugs. Patient is not sexually active and past surgical history of total hysterectomy.       Hip Pain    Pertinent negatives include no numbness.   Rectal Bleeding   Associated symptoms include abdominal pain and fatigue. Pertinent negatives include no arthralgias, chest pain, chills, congestion, coughing, fever, headaches, nausea, numbness, rash, sore throat, vomiting or weakness.     Review of Systems   Constitutional: Positive for fatigue. Negative for chills and fever.   HENT: Negative for congestion, ear pain, postnasal drip, sore throat and trouble swallowing.    Eyes: Negative for pain and discharge.   Respiratory: Negative for cough and shortness of breath.    Cardiovascular: Negative for chest pain and palpitations.   Gastrointestinal: Positive for abdominal pain, anal bleeding, constipation and hematochezia. Negative for diarrhea, nausea and vomiting.   Genitourinary: Positive for pelvic pain. Negative for dysuria, hematuria and vaginal pain.    Musculoskeletal: Positive for back pain. Negative for arthralgias and neck stiffness.   Skin: Negative for pallor and rash.   Neurological: Negative for syncope, weakness, numbness and headaches.   Psychiatric/Behavioral: Negative for confusion. The patient is not nervous/anxious.        Objective:      Physical Exam   Constitutional: She is oriented to person, place, and time. She appears well-developed and well-nourished.   HENT:   Head: Normocephalic and atraumatic.   Eyes: Conjunctivae and EOM are normal. Pupils are equal, round, and reactive to light.   Neck: Normal range of motion. Neck supple.   Cardiovascular: Normal rate, regular rhythm, normal heart sounds and intact distal pulses.    Pulmonary/Chest: Effort normal and breath sounds normal.   Abdominal: There is tenderness in the right lower quadrant, suprapubic area and left lower quadrant. There is no rebound, no tenderness at McBurney's point and negative Edge's sign.   Musculoskeletal: Normal range of motion. She exhibits tenderness.   Neurological: She is alert and oriented to person, place, and time.   Skin: Skin is warm and dry.   Psychiatric: She has a normal mood and affect.   Vitals reviewed.      Labs:      Imaging Results          X-Ray Chest 1 View (Final result)  Result time 06/03/18 15:51:44    Final result by Gopi Galindo MD (06/03/18 15:51:44)                 Impression:      1. No convincing acute cardiopulmonary process noting exam limitations above.      Electronically signed by: Gopi Galindo MD  Date:    06/03/2018  Time:    15:51             Narrative:    EXAMINATION:  XR CHEST 1 VIEW    CLINICAL HISTORY:  Sepsis, unspecified organism    TECHNIQUE:  Single frontal view of the chest was performed.    COMPARISON:  11/20/2012, CT 06/03/2018    FINDINGS:  Patient is rotated, breast tissue obscures views of the left lower lung zones.  The cardiomediastinal silhouette is not enlarged, allowing for patient rotation..  There is  no pleural effusion.  The trachea is midline.  The lungs are symmetrically expanded bilaterally without convincing acute cardiopulmonary process noting suboptimal evaluation of the left lower lung zones..  No large focal consolidation seen.  There is no pneumothorax.  The osseous structures are remarkable for degenerative changes..                               CT Renal Stone Study ABD Pelvis WO (Final result)  Result time 06/03/18 13:35:51    Final result by Gopi Galindo MD (06/03/18 13:35:51)                 Impression:      1. Stable prominence of the common duct, without intraluminal filling defect.  ERCP as warranted.  2. Multiple colonic diverticula without findings to suggest diverticulitis.  3. Soft tissue induration in the region of the mons, nonspecific, correlation with any focal tenderness or superficial cellulitis as warranted.  4. Several additional findings above.      Electronically signed by: Gopi Galindo MD  Date:    06/03/2018  Time:    13:35             Narrative:    EXAMINATION:  CT RENAL STONE STUDY ABD PELVIS WO    CLINICAL HISTORY:  Abdominal pain, unspecified; Lower abdominal pain, unspecified    TECHNIQUE:  Low dose axial images, sagittal and coronal reformations were obtained from the lung bases to the pubic symphysis.  Contrast was not administered.    COMPARISON:  01/08/2018    FINDINGS:  Images of the lower thorax are remarkable for dependent atelectasis.    The liver is mildly enlarged, correlation with LFTs recommended.  The spleen, and adrenal glands are grossly unremarkable.  There is fatty atrophy of the pancreas without ductal dilation.  There is a calcification within the pancreatic body, or related to vascular calcification, similar to the previous examination.  The gallbladder is surgically absent.  There is continued prominence of the common duct, no filling defect seen.  The pancreatic duct is not dilated.  No significant abdominal lymphadenopathy noting several  scattered shotty periaortic and paracaval lymph nodes.    The kidneys have a grossly unremarkable noncontrast appearance, noting a subcentimeter exophytic lesion arising from the interpolar region of the left kidney, stable.  The bilateral ureters are grossly unremarkable without convincing calculi seen.  The urinary bladder is grossly unremarkable.  The uterus is absent the adnexa is unremarkable.    There are several scattered colonic diverticula along the course of the large bowel, no convincing surrounding inflammation or wall thickening to suggest diverticulitis.  The terminal ileum is unremarkable.  The appendix is not identified.  Scattered shotty mesenteric lymph nodes are noted.  No focal organized pelvic fluid collection.    Degenerative changes are noted of the pubic symphysis, sacroiliac joints, and lumbar spine without focal osseous destructive process.  There is grade 1 anterolisthesis of L4 on L5, similar in appearance to the previous exam.  There is atherosclerotic calcification of the aorta and its branches.  No significant inguinal lymphadenopathy.  There is induration about the anterior pelvis in the region of the mons, clinical correlation with any focal tenderness in this region is recommended, no focal fluid collection.  There is slight induration of the soft tissues of the anterior abdominal wall, similar to the previous exam.  There are calcified gluteal granulomas.                                Assessment:       1. Sepsis, due to unspecified organism    2. Lower abdominal pain    3. Rectal bleed    4. Anemia, unspecified type    5. Hyperglycemia    6. Sepsis    7. Cellulitis of pubic region        Plan:      -Start empiric IV antibiotics (Vancomycin, Imipenem)- taper to pathogen with results blood cultures

## 2018-06-04 NOTE — H&P
[facility] Medicine  History & Physical    Patient Name: Raissa Land  MRN: [id number]  Admission Date: 6/3/2018  Attending Physician: Sydni Deras MD   Primary Care Provider: Guerrero Hensley Jr, MD         Patient information was obtained from patient.     Subjective:     Principal Problem:Cellulitis of pubic region    Chief Complaint: Pubic pain for two weeks.    HPI: Ms. Raissa Land is a 67 y.o. female with essential hypertension, hyperlipidemia (.6 Jan 2018), type 2 diabetes mellitus (HbA1c 7.0% Jan 2018), CAD, chronic combined systolic and diastolic heart failure (LVEF 35-40% Jan 2018), COPD, anemia of chronic disease, GERD, tobacco abuse, and depression who presented initially to [facility] ED with complaints of pelvic pain for two weeks.  She reports that it's been making it very difficult for her to walk without a walk due to the pain.  The pain is localized to the suprapubic area but feels a little deeper.  She did not have any recent trauma to the area.  She denies any fevers, chills, nausea, vomiting, abdominal pain, diarrhea, dysuria, nor any hematuria.  She otherwise has been feeling well with a normal appetite.    Chart Review:  Previous Hospitalizations  Date Hospital Diagnosis   Jan 2018 [facility] Hypertensive emergency    Nov 2012 [facility] Hyperglycemia      Outpatient Follow-Up  Date of Visit Physician Service   Mar 2014 Veda Salazar MD Orthopedic Surgery      Past Medical History:   Diagnosis Date    Anxiety     Arthritis     Asthma     Bronchiectasis with acute exacerbation     Bronchitis     Chronic pain     right knee    Coronary artery disease involving native coronary artery of native heart without angina pectoris 1/10/2018    Essential hypertension, benign 11/21/2012    Hypotension, iatrogenic     Insomnia     Knee pain, right     chronic    Mitral valve prolapse     Obstructive chronic bronchitis without exacerbation 11/21/2012    Type II or  "unspecified type diabetes mellitus without mention of complication, uncontrolled     Type II or unspecified type diabetes mellitus without mention of complication, uncontrolled        Past Surgical History:   Procedure Laterality Date    bowel reconstruction      BREAST SURGERY      CHOLECYSTECTOMY      HYSTERECTOMY      KNEE SURGERY      R, surgeries x 7    Right Leg reconstruction surgery      Rotator Cuff Surgery      TONSILLECTOMY      WRIST SURGERY         Review of patient's allergies indicates:  No Known Allergies    No current facility-administered medications on file prior to encounter.      Current Outpatient Prescriptions on File Prior to Encounter   Medication Sig    ADVAIR DISKUS 250-50 mcg/dose diskus inhaler     atorvastatin (LIPITOR) 20 MG tablet Take 1 tablet (20 mg total) by mouth every evening.    gabapentin (NEURONTIN) 600 MG tablet     LEVEMIR FLEXPEN 100 unit/mL (3 mL) InPn     lisinopril 10 MG tablet Take 4 tablets (40 mg total) by mouth once daily.    NOVOLOG FLEXPEN 100 unit/mL InPn     potassium chloride (MICRO-K) 10 MEQ CpSR Take 10 mEq by mouth once daily.      quetiapine (SEROQUEL) 100 MG Tab Take by mouth every evening.    SURE COMFORT PEN NEEDLE 31 x 3/16 " Ndle     trazodone (DESYREL) 100 MG tablet Take 100 mg by mouth every evening.    aspirin (ECOTRIN) 81 MG EC tablet Take 1 tablet (81 mg total) by mouth once daily.    carvedilol (COREG) 25 MG tablet Take 1 tablet (25 mg total) by mouth 2 (two) times daily.    diclofenac sodium (VOLTAREN) 1 % Gel Apply 2 g topically 4 (four) times daily.    furosemide (LASIX) 20 MG tablet Take 1 tablet (20 mg total) by mouth once daily.    pantoprazole (PROTONIX) 40 MG tablet Take 1 tablet (40 mg total) by mouth once daily.    sertraline (ZOLOFT) 100 MG tablet      Family History     Problem Relation (Age of Onset)    Heart disease Sister, Brother    Hypertension Mother        Social History Main Topics    Smoking status: " Current Every Day Smoker     Packs/day: 0.25     Years: 45.00     Types: Cigarettes    Smokeless tobacco: Never Used    Alcohol use No    Drug use: Yes     Types: Marijuana    Sexual activity: Not Currently     Partners: Male     Birth control/ protection: None     Review of Systems   Constitutional: Negative for activity change, appetite change, chills, diaphoresis, fatigue, fever and unexpected weight change.   HENT: Negative.    Eyes: Negative.    Respiratory: Negative for cough, chest tightness, shortness of breath and wheezing.    Cardiovascular: Negative for chest pain, palpitations and leg swelling.   Gastrointestinal: Negative for abdominal distention, abdominal pain, blood in stool, constipation, diarrhea, nausea and vomiting.   Genitourinary: Positive for pelvic pain. Negative for dysuria and hematuria.   Musculoskeletal: Negative.    Skin: Negative.    Neurological: Negative for dizziness, seizures, syncope, weakness and light-headedness.   Psychiatric/Behavioral: Negative.      Objective:     Vital Signs (Most Recent):  Temp: 98.4 °F (36.9 °C) (06/03/18 2040)  Pulse: 84 (06/03/18 2040)  Resp: 18 (06/03/18 2040)  BP: 131/62 (06/03/18 2040)  SpO2: 95 % (06/03/18 2040) Vital Signs (24h Range):  Temp:  [98 °F (36.7 °C)-98.4 °F (36.9 °C)] 98.4 °F (36.9 °C)  Pulse:  [] 84  Resp:  [17-18] 18  SpO2:  [93 %-98 %] 95 %  BP: ()/(49-62) 131/62     Weight: 86.2 kg (190 lb)  Body mass index is 37.11 kg/m².    Physical Exam   Constitutional: She is oriented to person, place, and time. She appears well-developed and well-nourished. No distress.   HENT:   Head: Normocephalic and atraumatic.   Right Ear: External ear normal.   Left Ear: External ear normal.   Nose: Nose normal.   Eyes: Right eye exhibits no discharge. Left eye exhibits no discharge.   Neck: Normal range of motion.   Cardiovascular: Normal rate, regular rhythm, normal heart sounds and intact distal pulses.  Exam reveals no gallop and no  "friction rub.    No murmur heard.  Pulmonary/Chest: Effort normal and breath sounds normal. No respiratory distress. She has no wheezes. She has no rales. She exhibits no tenderness.   Abdominal: Soft. Bowel sounds are normal. She exhibits no distension. There is no tenderness. There is no rebound and no guarding.   Genitourinary:   Genitourinary Comments: Mild tenderness to palpation to the supra pubic area with some induration, no fluctuance   Musculoskeletal: Normal range of motion. She exhibits no edema.   Neurological: She is alert and oriented to person, place, and time.   Skin: Skin is warm and dry. She is not diaphoretic. No erythema.   Psychiatric: She has a normal mood and affect. Her behavior is normal. Judgment and thought content normal.   Nursing note and vitals reviewed.          Significant Labs: All pertinent labs within the past 24 hours have been reviewed.    Significant Imaging: I have reviewed and interpreted all pertinent imaging results/findings within the past 24 hours.    Assessment/Plan:     * Cellulitis of pubic region    Patient has been having pubic pain for the past two weeks and a CT-renal study was revealing for "[s]oft tissue induration in the region of the mons...".  She does meet criteria for severe sepsis and has been started empirically on vancomycin and piperacillin/tazobactam.  Her lactic acid is normal at this time and she is otherwise hemodynamically-stable.  Blood and urine cultures are pending.  There was no evidence of abscess.         Severe sepsis    This patient meets criteria for sepsis given tachycardia, leukocytosis, and cellulitis.  Blood and urine cultures are pending; will start IV fluid hydration and broad spectrum antibiotics.        Acute renal failure    Patient's urinalysis is significant for a specific gravity of 1.015 but otherwise benign.  Urine output has been fair.  Will obtain additional urine studies; provide aggressive IV fluid hydration; monitor the " urine output; recheck the renal function in the morning; and avoid nephrotoxins.        Essential hypertension, benign    Patient's blood pressure is well-controlled; will continue home regimen of carvedilol, and provide as-needed clonidine.  Will hold her home regimen of furosemide and lisinopril due to renal failure.        Hyperlipidemia    Poorly-controlled; will continue patient's home regimen of atorvastatin.        Type 2 diabetes mellitus, controlled    Well-controlled on a home regimen of basal-prandial insulin therapy; will provide basal-prandial insulin along with insulin sliding scale.        Coronary artery disease involving native coronary artery of native heart without angina pectoris    Stable; will continue her home regimen of atorvastatin and carvedilol, but hold her home regimen of lisinopril due to renal failure.        Chronic combined systolic and diastolic heart failure    Stable without evidence of acute heart failure; will continue her home regimen of carvedilol and restart lisinopril when her renal function improves.        COPD (chronic obstructive pulmonary disease)    Clinically-stable without wheezing.  Will continue home regimen of ICS/LABA and have as-needed SEAN/LAMA available.        Anemia of chronic disease    The patient's H/H is stable and consistent with previous laboratory measurements, and the patient exhibits no signs or symptoms of acute bleeding; there is no indication for transfusion.  Will continue to monitor.        GERD (gastroesophageal reflux disease)    Will continue her home regimen of pantoprazole.        Tobacco abuse    Patient was counseled on smoking cessation and she will be provided a nicotine transdermal patch applied while inpatient.  Will provide additional smoking cessation counseling prior to discharge.        Depression    Stable; will continue her home regimen of sertraline.          VTE Risk Mitigation         Ordered     heparin (porcine) injection  5,000 Units  Every 12 hours      06/03/18 2110     IP VTE HIGH RISK PATIENT  Once      06/03/18 2110           Felix Damon M.D.  Staff Nocturnist  Department of Hospital Medicine  [facility]  Pager: [phone]

## 2018-06-05 LAB
ALBUMIN SERPL BCP-MCNC: 2.4 G/DL
ANION GAP SERPL CALC-SCNC: 6 MMOL/L
BASOPHILS # BLD AUTO: 0.02 K/UL
BASOPHILS NFR BLD: 0.1 %
BUN SERPL-MCNC: 17 MG/DL
CALCIUM SERPL-MCNC: 8.7 MG/DL
CHLORIDE SERPL-SCNC: 110 MMOL/L
CO2 SERPL-SCNC: 28 MMOL/L
CREAT SERPL-MCNC: 1.2 MG/DL
DIFFERENTIAL METHOD: ABNORMAL
EOSINOPHIL # BLD AUTO: 0.3 K/UL
EOSINOPHIL NFR BLD: 2 %
ERYTHROCYTE [DISTWIDTH] IN BLOOD BY AUTOMATED COUNT: 15.2 %
EST. GFR  (AFRICAN AMERICAN): 54 ML/MIN/1.73 M^2
EST. GFR  (NON AFRICAN AMERICAN): 47 ML/MIN/1.73 M^2
GLUCOSE SERPL-MCNC: 74 MG/DL
HCT VFR BLD AUTO: 23.4 %
HGB BLD-MCNC: 7.6 G/DL
LYMPHOCYTES # BLD AUTO: 2.5 K/UL
LYMPHOCYTES NFR BLD: 17.5 %
MCH RBC QN AUTO: 26.3 PG
MCHC RBC AUTO-ENTMCNC: 32.5 G/DL
MCV RBC AUTO: 81 FL
MONOCYTES # BLD AUTO: 0.9 K/UL
MONOCYTES NFR BLD: 6.1 %
NEUTROPHILS # BLD AUTO: 10.6 K/UL
NEUTROPHILS NFR BLD: 74.3 %
PHOSPHATE SERPL-MCNC: 4.1 MG/DL
PLATELET # BLD AUTO: 374 K/UL
PMV BLD AUTO: 9.7 FL
POCT GLUCOSE: 141 MG/DL (ref 70–110)
POCT GLUCOSE: 152 MG/DL (ref 70–110)
POCT GLUCOSE: 208 MG/DL (ref 70–110)
POCT GLUCOSE: 63 MG/DL (ref 70–110)
POCT GLUCOSE: 79 MG/DL (ref 70–110)
POTASSIUM SERPL-SCNC: 3.6 MMOL/L
RBC # BLD AUTO: 2.89 M/UL
SODIUM SERPL-SCNC: 144 MMOL/L
VANCOMYCIN TROUGH SERPL-MCNC: 29.3 UG/ML
WBC # BLD AUTO: 14.31 K/UL

## 2018-06-05 PROCEDURE — G8979 MOBILITY GOAL STATUS: HCPCS | Mod: CI

## 2018-06-05 PROCEDURE — 97165 OT EVAL LOW COMPLEX 30 MIN: CPT

## 2018-06-05 PROCEDURE — 25000003 PHARM REV CODE 250: Performed by: INTERNAL MEDICINE

## 2018-06-05 PROCEDURE — 63600175 PHARM REV CODE 636 W HCPCS: Performed by: EMERGENCY MEDICINE

## 2018-06-05 PROCEDURE — 99900035 HC TECH TIME PER 15 MIN (STAT)

## 2018-06-05 PROCEDURE — 11000001 HC ACUTE MED/SURG PRIVATE ROOM

## 2018-06-05 PROCEDURE — 97161 PT EVAL LOW COMPLEX 20 MIN: CPT

## 2018-06-05 PROCEDURE — 36415 COLL VENOUS BLD VENIPUNCTURE: CPT

## 2018-06-05 PROCEDURE — 25000003 PHARM REV CODE 250: Performed by: EMERGENCY MEDICINE

## 2018-06-05 PROCEDURE — G8978 MOBILITY CURRENT STATUS: HCPCS | Mod: CK

## 2018-06-05 PROCEDURE — 80069 RENAL FUNCTION PANEL: CPT

## 2018-06-05 PROCEDURE — 85025 COMPLETE CBC W/AUTO DIFF WBC: CPT

## 2018-06-05 PROCEDURE — 80202 ASSAY OF VANCOMYCIN: CPT

## 2018-06-05 RX ORDER — SODIUM CHLORIDE, SODIUM LACTATE, POTASSIUM CHLORIDE, CALCIUM CHLORIDE 600; 310; 30; 20 MG/100ML; MG/100ML; MG/100ML; MG/100ML
INJECTION, SOLUTION INTRAVENOUS CONTINUOUS
Status: DISCONTINUED | OUTPATIENT
Start: 2018-06-05 | End: 2018-06-06

## 2018-06-05 RX ORDER — AMOXICILLIN 250 MG
2 CAPSULE ORAL 2 TIMES DAILY
Status: DISCONTINUED | OUTPATIENT
Start: 2018-06-05 | End: 2018-06-09 | Stop reason: HOSPADM

## 2018-06-05 RX ADMIN — QUETIAPINE FUMARATE 100 MG: 100 TABLET ORAL at 08:06

## 2018-06-05 RX ADMIN — PIPERACILLIN SODIUM AND TAZOBACTAM SODIUM 4.5 G: 4; .5 INJECTION, POWDER, LYOPHILIZED, FOR SOLUTION INTRAVENOUS at 08:06

## 2018-06-05 RX ADMIN — DEXTROSE MONOHYDRATE 12.5 G: 25 INJECTION, SOLUTION INTRAVENOUS at 11:06

## 2018-06-05 RX ADMIN — SODIUM CHLORIDE, SODIUM LACTATE, POTASSIUM CHLORIDE, AND CALCIUM CHLORIDE: .6; .31; .03; .02 INJECTION, SOLUTION INTRAVENOUS at 12:06

## 2018-06-05 RX ADMIN — OXYCODONE HYDROCHLORIDE AND ACETAMINOPHEN 1 TABLET: 10; 325 TABLET ORAL at 08:06

## 2018-06-05 RX ADMIN — INSULIN ASPART 5 UNITS: 100 INJECTION, SOLUTION INTRAVENOUS; SUBCUTANEOUS at 07:06

## 2018-06-05 RX ADMIN — OXYCODONE HYDROCHLORIDE AND ACETAMINOPHEN 1 TABLET: 10; 325 TABLET ORAL at 02:06

## 2018-06-05 RX ADMIN — ATORVASTATIN CALCIUM 20 MG: 10 TABLET, FILM COATED ORAL at 08:06

## 2018-06-05 RX ADMIN — RAMELTEON 8 MG: 8 TABLET, FILM COATED ORAL at 11:06

## 2018-06-05 RX ADMIN — SODIUM CHLORIDE, SODIUM LACTATE, POTASSIUM CHLORIDE, AND CALCIUM CHLORIDE: .6; .31; .03; .02 INJECTION, SOLUTION INTRAVENOUS at 11:06

## 2018-06-05 RX ADMIN — PIPERACILLIN SODIUM AND TAZOBACTAM SODIUM 4.5 G: 4; .5 INJECTION, POWDER, LYOPHILIZED, FOR SOLUTION INTRAVENOUS at 05:06

## 2018-06-05 RX ADMIN — PANTOPRAZOLE SODIUM 40 MG: 40 TABLET, DELAYED RELEASE ORAL at 08:06

## 2018-06-05 RX ADMIN — OXYCODONE HYDROCHLORIDE AND ACETAMINOPHEN 1 TABLET: 10; 325 TABLET ORAL at 04:06

## 2018-06-05 RX ADMIN — GABAPENTIN 300 MG: 300 CAPSULE ORAL at 08:06

## 2018-06-05 RX ADMIN — SERTRALINE HYDROCHLORIDE 100 MG: 50 TABLET ORAL at 08:06

## 2018-06-05 RX ADMIN — INSULIN ASPART 2 UNITS: 100 INJECTION, SOLUTION INTRAVENOUS; SUBCUTANEOUS at 08:06

## 2018-06-05 NOTE — PLAN OF CARE
Problem: Occupational Therapy Goal  Goal: Occupational Therapy Goal  Goals to be met by: 6/14/18     Patient will increase functional independence with ADLs by performing:    Feeding with Modified [place].  UE Dressing with Modified [place].  LE Dressing with Supervision.  Grooming while standing with Set-up Assistance.  Toileting from toilet with Set-up Assistance for hygiene and clothing management.   Supine to sit with Set-up Assistance.  Toilet transfer to toilet with Supervision.  Upper extremity exercise program x20 reps per handout, with independence.    Outcome: Ongoing (interventions implemented as appropriate)  OT completed evaluation    Rec: Rollator and Home health OT

## 2018-06-05 NOTE — NURSING
Notified Dr Pitts of vancomycin trough 29.3. Orders received to discontinue vancomycin and lab random level in am tomorrow

## 2018-06-05 NOTE — ASSESSMENT & PLAN NOTE
She met criteria for sepsis given tachycardia, leukocytosis, and cellulitis.  Blood and urine cultures NGTD; IV fluid hydration and broad spectrum antibiotics.

## 2018-06-05 NOTE — ASSESSMENT & PLAN NOTE
With reported bright red blood per rectum prior to admission. Hgb 7.9 g/dL on admit which is lower than baseline. No upper GI symptoms. GI consulted. Hold heparin and aspirin.

## 2018-06-05 NOTE — ASSESSMENT & PLAN NOTE
"Patient has been having pubic pain for the past two weeks and a CT-renal study was revealing for "[s]oft tissue induration in the region of the mons...".  She met criteria for severe sepsis and was started empirically on vancomycin and piperacillin/tazobactam.  Her lactic acid was normal. Held antihypertensives for low BP.  Blood and urine cultures NGTD.  There was no evidence of abscess on physical exam or CT.  "

## 2018-06-05 NOTE — PLAN OF CARE
Problem: Patient Care Overview  Goal: Plan of Care Review  Patient sats 94% on RA/not in any distress

## 2018-06-05 NOTE — PT/OT/SLP EVAL
Physical Therapy Evaluation    Patient Name:  Raissa Land   MRN:  [id number]    Recommendations:     Discharge Recommendations:  home health PT   Discharge Equipment Recommendations: rollator   Barriers to discharge: lives alone but stated her daughter can help     Assessment:     Raissa Land is a 67 y.o. female admitted with a medical diagnosis of Cellulitis of pubic region.  She presents with the following impairments/functional limitations:  weakness, impaired functional mobilty, gait instability, impaired balance, pain, decreased lower extremity function. Patient limited in PT session due to pelvic and left sided hip pain. She stated that her left hip is hurting where she received her steroid injection.     Rehab Prognosis:  fair; patient would benefit from acute skilled PT services to address these deficits and reach maximum level of function.      Recent Surgery: * No surgery found *      Plan:     During this hospitalization, patient to be seen 5 x/week to address the above listed problems via gait training, therapeutic activities, therapeutic exercises  · Plan of Care Expires:  06/19/18   Plan of Care Reviewed with: patient    Subjective     Communicated with nurse Nicky prior to session.  Patient found supine in bed upon PT entry to room, agreeable to evaluation.      Chief Complaint: Pelvic pain.   Patient comments/goals: To get rid of pain.   Pain/Comfort:  · Pain Rating 1: 8/10  · Location - Side 1: Bilateral  · Location 1:  Pelvis and left hip   · Pain Addressed 1: Pre-medicate for activity, Cessation of Activity, Nurse notified  · Pain Rating Post-Intervention 1: 8/10    Living Environment:  Patient lives home alone. Prior to admission, patients level of function was independent but has been using the rollator for the past 2 weeks because her pain was so bad. She was driving. Patient has the following equipment: shower chair, cane, straight (rollator is broken). Upon discharge, patient will have  assistance from daughter.    Objective:     Patient found with: peripheral IV     General Precautions: Standard, fall   Orthopedic Precautions:N/A   Braces: N/A     Exams:  · Cognitive Exam:  Patient is oriented to Person, Place, Time and Situation and follows 100% of two step commands   · Gross Motor Coordination:  WFL  · Postural Exam:  Patient presented with the following abnormalities:    · -       No postural abnormalities identified  · Sensation:    · -       Intact  · Skin Integrity/Edema:      · -       Skin integrity: Visible skin intact  · RLE ROM: WFL except limited at hip due to pain  · RLE Strength: WFL except limited at hip due to pain  · LLE ROM: WFL except limited at hip due to pain  · LLE Strength: WFL except limited at hip due to pain    Functional Mobility:  · Bed Mobility:     · Supine to Sit: moderate assistance  · Sit to Supine: minimum assistance  · Transfers:     · Sit to Stand:  contact guard assistance with rolling walker  · Gait:  Patient ambulated 4 steps forward and 4 steps backward with Rolling Walker and CGA using 3-point gait. Patient demonstrated decreased samina, decreased velocity of limb motion, decreased step length and decreased weight-shifting ability during gait due to impaired balance, pain and decreased strength.    AM-PAC 6 CLICK MOBILITY  Total Score:16     Patient left supine with all lines intact, call button in reach and nurse Nicky notified.    GOALS:    Physical Therapy Goals        Problem: Physical Therapy Goal    Goal Priority Disciplines Outcome Goal Variances Interventions   Physical Therapy Goal     PT/OT, PT      Description:  Goals to be met by: 18    Patient will increase functional independence with mobility by performin. Supine to sit with supervision  2. Sit to stand transfer with Supervision  3. Gait  x 150 feet with Supervision using Rolling Walker  4. Lower extremity exercise program x30 reps with supervision                    History:      Past Medical History:   Diagnosis Date    Anxiety     Arthritis     Asthma     Bronchiectasis with acute exacerbation     Bronchitis     Chronic pain     right knee    Coronary artery disease involving native coronary artery of native heart without angina pectoris 1/10/2018    Essential hypertension, benign 11/21/2012    Hypotension, iatrogenic     Insomnia     Knee pain, right     chronic    Mitral valve prolapse     Obstructive chronic bronchitis without exacerbation 11/21/2012    Type II or unspecified type diabetes mellitus without mention of complication, uncontrolled     Type II or unspecified type diabetes mellitus without mention of complication, uncontrolled        Past Surgical History:   Procedure Laterality Date    bowel reconstruction      BREAST SURGERY      CHOLECYSTECTOMY      HYSTERECTOMY      KNEE SURGERY      R, surgeries x 7    Right Leg reconstruction surgery      Rotator Cuff Surgery      TONSILLECTOMY      WRIST SURGERY       Time Tracking:     PT Received On: 06/05/18  PT Start Time: 1421     PT Stop Time: 1436  PT Total Time (min): 15 min     Billable Minutes: Evaluation  15 with OT present    Annie Zapata, PT  06/05/2018

## 2018-06-05 NOTE — ASSESSMENT & PLAN NOTE
Patient was counseled on smoking cessation and she will be provided a nicotine transdermal patch applied while inpatient.

## 2018-06-05 NOTE — SUBJECTIVE & OBJECTIVE
Interval History: persistent suprapubic pain.     Review of Systems   Constitutional: Negative for activity change, appetite change, chills, diaphoresis, fatigue, fever and unexpected weight change.   HENT: Negative.    Eyes: Negative.    Respiratory: Negative for cough, chest tightness, shortness of breath and wheezing.    Cardiovascular: Negative for chest pain, palpitations and leg swelling.   Gastrointestinal: Negative for abdominal distention, abdominal pain, blood in stool, constipation, diarrhea, nausea and vomiting.   Genitourinary: Negative for dysuria and hematuria.        Suprapubic pain   Musculoskeletal: Negative.    Skin: Negative.    Neurological: Negative for dizziness, seizures, syncope, weakness and light-headedness.   Psychiatric/Behavioral: Negative.      Objective:     Vital Signs (Most Recent):  Temp: 98 °F (36.7 °C) (06/05/18 1048)  Pulse: 67 (06/05/18 1048)  Resp: 18 (06/05/18 1048)  BP: (!) 115/55 (06/05/18 1048)  SpO2: (!) 92 % (06/05/18 1048) Vital Signs (24h Range):  Temp:  [97.9 °F (36.6 °C)-98.6 °F (37 °C)] 98 °F (36.7 °C)  Pulse:  [64-74] 67  Resp:  [18-19] 18  SpO2:  [90 %-94 %] 92 %  BP: (102-128)/(54-59) 115/55     Weight: 86.2 kg (190 lb)  Body mass index is 37.11 kg/m².    Intake/Output Summary (Last 24 hours) at 06/05/18 1226  Last data filed at 06/05/18 1225   Gross per 24 hour   Intake             2330 ml   Output             1600 ml   Net              730 ml      Physical Exam   Constitutional: She is oriented to person, place, and time. She appears well-developed. No distress.   HENT:   Head: Normocephalic and atraumatic.   Right Ear: External ear normal.   Left Ear: External ear normal.   Nose: Nose normal.   Eyes: Right eye exhibits no discharge. Left eye exhibits no discharge.   Neck: Normal range of motion.   Cardiovascular: Normal rate, regular rhythm, normal heart sounds and intact distal pulses.  Exam reveals no gallop and no friction rub.    No murmur  heard.  Pulmonary/Chest: Effort normal and breath sounds normal. No respiratory distress. She has no wheezes. She has no rales. She exhibits no tenderness.   Abdominal: Soft. Bowel sounds are normal. She exhibits no distension. There is no tenderness. There is no rebound and no guarding.   Genitourinary:   Genitourinary Comments: Mild tenderness to palpation to the supra pubic area. No induration. No fluctuance. No erythema   Musculoskeletal: Normal range of motion. She exhibits no edema.   Neurological: She is alert and oriented to person, place, and time.   Skin: Skin is warm and dry. She is not diaphoretic. No erythema.   Nursing note and vitals reviewed.      Significant Labs: All pertinent labs within the past 24 hours have been reviewed.    Significant Imaging: I have reviewed all pertinent imaging results/findings within the past 24 hours.  I have reviewed and interpreted all pertinent imaging results/findings within the past 24 hours.

## 2018-06-05 NOTE — PLAN OF CARE
Problem: Physical Therapy Goal  Goal: Physical Therapy Goal  Goals to be met by: 18    Patient will increase functional independence with mobility by performin. Supine to sit with supervision  2. Sit to stand transfer with Supervision  3. Gait  x 150 feet with Supervision using Rolling Walker  4. Lower extremity exercise program x30 reps with supervision      Patient would continue to benefit from PT while in the hospital.

## 2018-06-05 NOTE — PLAN OF CARE
Problem: Patient Care Overview  Goal: Plan of Care Review  Patient ambulated to bedside commode several times with standby assist only and no distress. No new signs and symptoms of infection. Pain mostly controlled by PRN medications. No acute distress noted at this time. Will continue to monitor closely.   06/05/18 4784   Coping/Psychosocial   Plan Of Care Reviewed With patient

## 2018-06-05 NOTE — CONSULTS
Reason for Consult:  Blood in stool, Anemia    HPI:  Pt is a 67 y.o. female who was admitted on 6/3/18 and is currently being treated for cellulitis of the pubic region.  Pt. Found to have moderate to severe anemia.  Unclear chronicity, though in 01/2018, had normal Hgb.  Pt. Only reports small, mild red blood with BM's, that she attributes to hemorrhoids.  No associated abd. Pain, N/V, F/C, wt. Loss.  No dysphagia, GERD sx's, yellowing of eyes/skin.  No diarrhea/constipation.      Pt. Reports she had a C-scope 10 years ago    Past Medical History:   Diagnosis Date    Anxiety     Arthritis     Asthma     Bronchiectasis with acute exacerbation     Bronchitis     Chronic pain     right knee    Coronary artery disease involving native coronary artery of native heart without angina pectoris 1/10/2018    Essential hypertension, benign 11/21/2012    Hypotension, iatrogenic     Insomnia     Knee pain, right     chronic    Mitral valve prolapse     Obstructive chronic bronchitis without exacerbation 11/21/2012    Type II or unspecified type diabetes mellitus without mention of complication, uncontrolled     Type II or unspecified type diabetes mellitus without mention of complication, uncontrolled        Past Surgical History:   Procedure Laterality Date    bowel reconstruction      BREAST SURGERY      CHOLECYSTECTOMY      HYSTERECTOMY      KNEE SURGERY      R, surgeries x 7    Right Leg reconstruction surgery      Rotator Cuff Surgery      TONSILLECTOMY      WRIST SURGERY         Family History   Problem Relation Age of Onset    Heart disease Sister     Heart disease Brother     Hypertension Mother        Social History     Social History    Marital status:      Spouse name: N/A    Number of children: N/A    Years of education: N/A     Occupational History    Not on file.     Social History Main Topics    Smoking status: Current Every Day Smoker     Packs/day: 0.25     Years: 45.00  "    Types: Cigarettes    Smokeless tobacco: Never Used    Alcohol use No    Drug use: Yes     Types: Marijuana    Sexual activity: Not Currently     Partners: Male     Birth control/ protection: None     Other Topics Concern    Not on file     Social History Narrative    . Adult children. On disability.        Endoscopic History:  C-scope, 10 years ago  EGD - 01/2018 - [facility]    Review of patient's allergies indicates:  No Known Allergies    No current facility-administered medications on file prior to encounter.      Current Outpatient Prescriptions on File Prior to Encounter   Medication Sig Dispense Refill    ADVAIR DISKUS 250-50 mcg/dose diskus inhaler       atorvastatin (LIPITOR) 20 MG tablet Take 1 tablet (20 mg total) by mouth every evening. 90 tablet 3    gabapentin (NEURONTIN) 600 MG tablet       LEVEMIR FLEXPEN 100 unit/mL (3 mL) InPn       lisinopril 10 MG tablet Take 4 tablets (40 mg total) by mouth once daily. 120 tablet 0    NOVOLOG FLEXPEN 100 unit/mL InPn       potassium chloride (MICRO-K) 10 MEQ CpSR Take 10 mEq by mouth once daily.        quetiapine (SEROQUEL) 100 MG Tab Take by mouth every evening.      SURE COMFORT PEN NEEDLE 31 x 3/16 " Ndle       trazodone (DESYREL) 100 MG tablet Take 100 mg by mouth every evening.      aspirin (ECOTRIN) 81 MG EC tablet Take 1 tablet (81 mg total) by mouth once daily.  0    carvedilol (COREG) 25 MG tablet Take 1 tablet (25 mg total) by mouth 2 (two) times daily. 60 tablet 11    diclofenac sodium (VOLTAREN) 1 % Gel Apply 2 g topically 4 (four) times daily. 5 Tube 5    furosemide (LASIX) 20 MG tablet Take 1 tablet (20 mg total) by mouth once daily. 30 tablet 11    pantoprazole (PROTONIX) 40 MG tablet Take 1 tablet (40 mg total) by mouth once daily. 30 tablet 11    sertraline (ZOLOFT) 100 MG tablet        Scheduled Meds:   atorvastatin  20 mg Oral QHS    fluticasone-vilanterol  1 puff Inhalation Daily    gabapentin  " 300 mg Oral QHS    insulin aspart U-100  5 Units Subcutaneous TIDWM    insulin detemir U-100  15 Units Subcutaneous QHS    nicotine  1 patch Transdermal Daily    pantoprazole  40 mg Oral Daily    piperacillin-tazobactam (ZOSYN) IVPB  4.5 g Intravenous Q8H    QUEtiapine  100 mg Oral Daily    senna-docusate 8.6-50 mg  2 tablet Oral BID    sertraline  100 mg Oral Daily     Continuous Infusions:   lactated ringers 125 mL/hr at 06/05/18 1243     PRN Meds:.acetaminophen, albuterol-ipratropium, cloNIDine, dextrose 50%, dextrose 50%, glucagon (human recombinant), glucose, glucose, insulin aspart U-100, ondansetron, oxyCODONE-acetaminophen, promethazine (PHENERGAN) IVPB, ramelteon    Review of Systems:  CONSTITUTIONAL: Negative for weakness, fever, weight loss, weight gain.  HEENT: Eyes: Negative for double/blurred vision. Ears: Negative pain or loss of hearing. Nose:Negative for nasal congestion. Negative for rhinorrhea Mouth: Negative for dry mouth/pain Throat: Negative for masses or hoarseness.  CARDIOVASCULAR: Negative for chest pain or palpitations.  RESPIRATORY: Negative for SOB, wheezes  GASTROINTESTINAL: See HPI  GENITOURINARY: Negative for dysuria/hematuria.  MUSCULOSKELETAL: + for osteoarthritis, no  muscle pain.  SKIN: Negative for rashes/lesions.  NEUROLOGIC: Negative for headaches, numbness/tingling.  ENDOCRINE: + for diabetes, no thyroid abnormalities.  HEMATOLOGIC: Negative for anemia or blood dyscrasias.    Patient Vitals for the past 24 hrs:   BP Temp Temp src Pulse Resp SpO2   06/05/18 1048 (!) 115/55 98 °F (36.7 °C) Oral 67 18 (!) 92 %   06/05/18 0707 (!) 128/56 97.9 °F (36.6 °C) Oral 70 18 (!) 92 %   06/05/18 0340 (!) 113/56 98.1 °F (36.7 °C) Oral 74 18 (!) 93 %   06/04/18 2313 (!) 102/54 98.6 °F (37 °C) Oral 74 18 (!) 93 %   06/04/18 2019 - - - - - (!) 94 %   06/04/18 1926 (!) 123/59 98.2 °F (36.8 °C) Oral 64 18 (!) 92 %   06/04/18 1623 (!) 125/56 98.5 °F (36.9 °C) Oral 72 19 (!) 90 %       Gen:  Well developed, well nourished, no apparent distress, cooperative  HEENT: Anicteric, PERRLA, normocephalic, neck symmetrical  CV: S1, S2, no murmers/rubs, non-displaced PMI  Lungs: CTA-B, normal excursion  Abd: Soft, NT, ND, normal BS's, no HSM  Ext: No c/c/e, 1+ DP pulses to BLE's  Neuro: CN II-XII grossly intact, no asterixis.  Skin: No rashes/lesions, normal texture  Psych: AA&O x 4, normal affect    Labs:    Recent Labs  Lab 06/05/18  0616   CALCIUM 8.7      K 3.6   CO2 28      BUN 17   CREATININE 1.2     Recent Results (from the past 336 hour(s))   CBC auto differential    Collection Time: 06/05/18  6:16 AM   Result Value Ref Range    WBC 14.31 (H) 3.90 - 12.70 K/uL    Hemoglobin 7.6 (L) 12.0 - 16.0 g/dL    Hematocrit 23.4 (L) 37.0 - 48.5 %    Platelets 374 (H) 150 - 350 K/uL   CBC auto differential    Collection Time: 06/04/18  5:05 AM   Result Value Ref Range    WBC 14.11 (H) 3.90 - 12.70 K/uL    Hemoglobin 7.9 (L) 12.0 - 16.0 g/dL    Hematocrit 24.6 (L) 37.0 - 48.5 %    Platelets 326 150 - 350 K/uL       Imaging:  CT:  1. Stable prominence of the common duct, without intraluminal filling defect.  S/p cholecystectomy.  2. Multiple colonic diverticula without findings to suggest diverticulitis.  3. Soft tissue induration in the region of the mons, nonspecific, correlation with any focal tenderness or superficial cellulitis as warranted.    Assessment:  Pt. Is a 67 y.o. female with:  1. Anemia, Microcytic - Likely multifactorial, some degree of GI blood loss + recent infectious issues.    2. Hematochezia - More subjectively consistent with hemorrhoidal.  Last C-scope reported to be 10 years ago.    3. Cellulitis of pubic region with sepsis.  4. HTN, Hyperlipidemia, DM, CAD, CHF, COPD, Depression.....      Recommendations:  1. Monitor for sx's of bleeding.  2. Continue to treat infectious issues.  3. Defer transfusion decision to PCP.  4. Defer C-scope to outpatient setting, once current infectious  issues are resolved/treated.  Patient does not feel she can do bowel prep currently.        I would like to take this opportunity to thank you for this consult.  If you have any questions or concerns, please do not hesitate to contact me.

## 2018-06-05 NOTE — PROGRESS NOTES
[facility] Hospital Medicine  Progress Note    Patient Name: Raissa Land  MRN: [id number]  Patient Class: IP- Inpatient   Admission Date: 6/3/2018  Length of Stay: 2 days  Attending Physician: Jeanie Pitts MD  Primary Care Provider: Guerrero Hensley Jr, MD        Subjective:     Principal Problem:Cellulitis of pubic region    HPI:  Ms. Raissa Land is a 67 y.o. female with essential hypertension, hyperlipidemia (.6 Jan 2018), type 2 diabetes mellitus (HbA1c 7.0% Jan 2018), CAD, chronic combined systolic and diastolic heart failure (LVEF 35-40% Jan 2018), COPD, anemia of chronic disease, GERD, tobacco abuse, and depression who presented initially to [facility] ED with complaints of pelvic pain for two weeks.  She reports that it's been making it very difficult for her to walk without a walk due to the pain.  The pain is localized to the suprapubic area but feels a little deeper.  She did not have any recent trauma to the area.  She denies any fevers, chills, nausea, vomiting, abdominal pain, diarrhea, dysuria, nor any hematuria.  She otherwise has been feeling well with a normal appetite.    Hospital Course:  Ms. Land presented with sepsis secondary to suprapubic induration without evidence of abscess on physical exam or CT or any erythema. She was hypotensive and with a lot of pain. She also reported bright red blood per rectum. She does have external hemorrhoids. Blood did not recur while inpatient but Hgb was 7.9 g/dL on presentation. She was started on pip-tazo and vanc for sepsis. Cultures NGTD. GI consulted for blood per rectum and anemia.    Interval History: persistent suprapubic pain.     Review of Systems   Constitutional: Negative for activity change, appetite change, chills, diaphoresis, fatigue, fever and unexpected weight change.   HENT: Negative.    Eyes: Negative.    Respiratory: Negative for cough, chest tightness, shortness of breath and wheezing.    Cardiovascular:  Negative for chest pain, palpitations and leg swelling.   Gastrointestinal: Negative for abdominal distention, abdominal pain, blood in stool, constipation, diarrhea, nausea and vomiting.   Genitourinary: Negative for dysuria and hematuria.        Suprapubic pain   Musculoskeletal: Negative.    Skin: Negative.    Neurological: Negative for dizziness, seizures, syncope, weakness and light-headedness.   Psychiatric/Behavioral: Negative.      Objective:     Vital Signs (Most Recent):  Temp: 98 °F (36.7 °C) (06/05/18 1048)  Pulse: 67 (06/05/18 1048)  Resp: 18 (06/05/18 1048)  BP: (!) 115/55 (06/05/18 1048)  SpO2: (!) 92 % (06/05/18 1048) Vital Signs (24h Range):  Temp:  [97.9 °F (36.6 °C)-98.6 °F (37 °C)] 98 °F (36.7 °C)  Pulse:  [64-74] 67  Resp:  [18-19] 18  SpO2:  [90 %-94 %] 92 %  BP: (102-128)/(54-59) 115/55     Weight: 86.2 kg (190 lb)  Body mass index is 37.11 kg/m².    Intake/Output Summary (Last 24 hours) at 06/05/18 1226  Last data filed at 06/05/18 1225   Gross per 24 hour   Intake             2330 ml   Output             1600 ml   Net              730 ml      Physical Exam   Constitutional: She is oriented to person, place, and time. She appears well-developed. No distress.   HENT:   Head: Normocephalic and atraumatic.   Right Ear: External ear normal.   Left Ear: External ear normal.   Nose: Nose normal.   Eyes: Right eye exhibits no discharge. Left eye exhibits no discharge.   Neck: Normal range of motion.   Cardiovascular: Normal rate, regular rhythm, normal heart sounds and intact distal pulses.  Exam reveals no gallop and no friction rub.    No murmur heard.  Pulmonary/Chest: Effort normal and breath sounds normal. No respiratory distress. She has no wheezes. She has no rales. She exhibits no tenderness.   Abdominal: Soft. Bowel sounds are normal. She exhibits no distension. There is no tenderness. There is no rebound and no guarding.   Genitourinary:   Genitourinary Comments: Mild tenderness to  "palpation to the supra pubic area. No induration. No fluctuance. No erythema   Musculoskeletal: Normal range of motion. She exhibits no edema.   Neurological: She is alert and oriented to person, place, and time.   Skin: Skin is warm and dry. She is not diaphoretic. No erythema.   Nursing note and vitals reviewed.      Significant Labs: All pertinent labs within the past 24 hours have been reviewed.    Significant Imaging: I have reviewed all pertinent imaging results/findings within the past 24 hours.  I have reviewed and interpreted all pertinent imaging results/findings within the past 24 hours.    Assessment/Plan:      * Cellulitis of pubic region    Patient has been having pubic pain for the past two weeks and a CT-renal study was revealing for "[s]oft tissue induration in the region of the mons...".  She met criteria for severe sepsis and was started empirically on vancomycin and piperacillin/tazobactam.  Her lactic acid was normal. Held antihypertensives for low BP.  Blood and urine cultures NGTD.  There was no evidence of abscess on physical exam or CT.        Severe sepsis    She met criteria for sepsis given tachycardia, leukocytosis, and cellulitis.  Blood and urine cultures NGTD; IV fluid hydration and broad spectrum antibiotics.        Anemia associated with acute blood loss    With reported bright red blood per rectum prior to admission. Hgb 7.9 g/dL on admit which is lower than baseline. No upper GI symptoms. GI consulted. Hold heparin and aspirin.         Acute renal failure    IV fluids.         Depression    Stable; will continue her home regimen of sertraline.        GERD (gastroesophageal reflux disease)    Will continue her home regimen of pantoprazole.        Hyperlipidemia    Poorly-controlled; will continue patient's home regimen of atorvastatin.        Chronic combined systolic and diastolic heart failure    Stable without evidence of acute heart failure        Coronary artery disease " involving native coronary artery of native heart without angina pectoris    Stable; will continue her home regimen of atorvastatin. Hold antihypertensives due to low BP        Tobacco abuse    Patient was counseled on smoking cessation and she will be provided a nicotine transdermal patch applied while inpatient.         Type 2 diabetes mellitus, controlled    Basal-prandial insulin along with insulin sliding scale.        COPD (chronic obstructive pulmonary disease)    Clinically-stable without wheezing.  Will continue home regimen of ICS/LABA and have as-needed SEAN/LAMA available.        Essential hypertension, benign    Patient's blood pressure currently low. Held antihypertensives          VTE Risk Mitigation         Ordered     Place GENARO hose  Until discontinued      06/04/18 1649     Place sequential compression device  Until discontinued      06/04/18 1649     IP VTE HIGH RISK PATIENT  Once      06/03/18 2110              Jeanie Pitts MD  Department of Hospital Medicine   [facility]

## 2018-06-05 NOTE — PT/OT/SLP EVAL
Occupational Therapy   Evaluation    Name: Raissa Land  MRN: [id number]  Admitting Diagnosis:  Cellulitis of pubic region      Recommendations:     Discharge Recommendations: home health OT  Discharge Equipment Recommendations:  rollator  Barriers to discharge:  None    History:     Occupational Profile:  Living Environment: Pt lives alone with her daughter who checks in as needed.   Previous level of function: independent includes driving  Roles and Routines: home with normal routine  Equipment Owned:  rollator, shower chair  Assistance upon Discharge: Daughter will assist as needed    Past Medical History:   Diagnosis Date    Anxiety     Arthritis     Asthma     Bronchiectasis with acute exacerbation     Bronchitis     Chronic pain     right knee    Coronary artery disease involving native coronary artery of native heart without angina pectoris 1/10/2018    Essential hypertension, benign 11/21/2012    Hypotension, iatrogenic     Insomnia     Knee pain, right     chronic    Mitral valve prolapse     Obstructive chronic bronchitis without exacerbation 11/21/2012    Type II or unspecified type diabetes mellitus without mention of complication, uncontrolled     Type II or unspecified type diabetes mellitus without mention of complication, uncontrolled          Past Surgical History:   Procedure Laterality Date    bowel reconstruction      BREAST SURGERY      CHOLECYSTECTOMY      HYSTERECTOMY      KNEE SURGERY      R, surgeries x 7    Right Leg reconstruction surgery      Rotator Cuff Surgery      TONSILLECTOMY      WRIST SURGERY         Subjective     Chief Complaint: Pain  Patient/Family stated goals: to get rid of pain  Communicated with: nurse Nicky prior to session.  Pain/Comfort:  Pain Rating 1: 8/10  Location - Side 1: Bilateral  Pain Rating 2: 8/10    Patients cultural, spiritual, Denominational conflicts given the current situation:      Objective:     Patient found with:      General  Precautions: Standard,     Orthopedic Precautions:    Braces:       Occupational Performance:    Bed Mobility:    · Patient completed Supine to Sit with moderate assistance  · Patient completed Sit to Supine with minimum assistance    Functional Mobility/Transfers:  · Patient completed Sit <> Stand Transfer with contact guard assistance  with  rolling walker   · Functional Mobility: Pt requires assist    Activities of Daily Living:  · UB Dressing: minimum assistance      Cognitive/Visual Perceptual:  Cognitive/Psychosocial Skills:     -       Oriented to: Person, Place, Time and Situation   -       Follows Commands/attention:Follows two-step commands  -       Communication: clear/fluent  -       Memory: No Deficits noted  -       Safety awareness/insight to disability: intact   -       Mood/Affect/Coping skills/emotional control: Appropriate to situation    Physical Exam:  Upper Extremity Range of Motion:     -       Right Upper Extremity: WFL  -       Left Upper Extremity: WFL  Upper Extremity Strength:    -       Right Upper Extremity: WFL  -       Left Upper Extremity: WFL   Strength: -       Right Upper Extremity: WFL  -       Left Upper Extremity: WFL  Fine Motor Coordination:    -       Intact  Gross motor coordination:   WFL    Patient left supine with all lines intact, call button in reach and nurse notified    AMPA 6 Click:  AMPA Total Score: 14    Education:    Assessment:     Raissa Land is a 67 y.o. female with a medical diagnosis of Cellulitis of pubic region.  She presents with good support from family at pt reports and severe pain which is a limiting factor.  Performance deficits affecting function are weakness, impaired endurance, impaired self care skills, impaired functional mobilty, gait instability, impaired muscle length.      Rehab Prognosis:  good; patient would benefit from acute skilled OT services to address these deficits and reach maximum level of function.         Clinical Decision  "Makin.  OT Low:  "Pt evaluation falls under low complexity for evaluation coding due to performance deficits noted in 1-3 areas as stated above and 0 co-morbities affecting current functional status. Data obtained from problem focused assessments. No modifications or assistance was required for completion of evaluation. Only brief occupational profile and history review completed."     Plan:     Patient to be seen 3 x/week to address the above listed problems via self-care/home management, therapeutic activities, therapeutic exercises  · Plan of Care Expires:    · Plan of Care Reviewed with: patient    This Plan of care has been discussed with the patient who was involved in its development and understands and is in agreement with the identified goals and treatment plan    GOALS:    Occupational Therapy Goals        Problem: Occupational Therapy Goal    Goal Priority Disciplines Outcome Interventions   Occupational Therapy Goal     OT, PT/OT Ongoing (interventions implemented as appropriate)    Description:  Goals to be met by: 18     Patient will increase functional independence with ADLs by performing:    Feeding with Modified [place].  UE Dressing with Modified [place].  LE Dressing with Supervision.  Grooming while standing with Set-up Assistance.  Toileting from toilet with Set-up Assistance for hygiene and clothing management.   Supine to sit with Set-up Assistance.  Toilet transfer to toilet with Supervision.  Upper extremity exercise program x20 reps per handout, with independence.                      Time Tracking:     OT Date of Treatment: 18  OT Start Time: 1420  OT Stop Time: 1440  OT Total Time (min): 20 min    Billable Minutes:Evaluation 20 with co treatment PT    Jannet Jung OT  2018    "

## 2018-06-06 LAB
ANION GAP SERPL CALC-SCNC: 8 MMOL/L
BACTERIA UR CULT: NORMAL
BASOPHILS # BLD AUTO: 0.02 K/UL
BASOPHILS NFR BLD: 0.1 %
BUN SERPL-MCNC: 10 MG/DL
CALCIUM SERPL-MCNC: 9 MG/DL
CHLORIDE SERPL-SCNC: 110 MMOL/L
CO2 SERPL-SCNC: 26 MMOL/L
CREAT SERPL-MCNC: 1 MG/DL
DIFFERENTIAL METHOD: ABNORMAL
EOSINOPHIL # BLD AUTO: 0.1 K/UL
EOSINOPHIL NFR BLD: 1 %
ERYTHROCYTE [DISTWIDTH] IN BLOOD BY AUTOMATED COUNT: 14.6 %
EST. GFR  (AFRICAN AMERICAN): >60 ML/MIN/1.73 M^2
EST. GFR  (NON AFRICAN AMERICAN): 58 ML/MIN/1.73 M^2
GLUCOSE SERPL-MCNC: 98 MG/DL
HCT VFR BLD AUTO: 22.5 %
HGB BLD-MCNC: 7.5 G/DL
LYMPHOCYTES # BLD AUTO: 2.2 K/UL
LYMPHOCYTES NFR BLD: 15.8 %
MCH RBC QN AUTO: 27 PG
MCHC RBC AUTO-ENTMCNC: 33.3 G/DL
MCV RBC AUTO: 81 FL
MONOCYTES # BLD AUTO: 0.8 K/UL
MONOCYTES NFR BLD: 5.7 %
NEUTROPHILS # BLD AUTO: 10.5 K/UL
NEUTROPHILS NFR BLD: 77.1 %
PLATELET # BLD AUTO: 400 K/UL
PMV BLD AUTO: 9.7 FL
POCT GLUCOSE: 123 MG/DL (ref 70–110)
POCT GLUCOSE: 132 MG/DL (ref 70–110)
POCT GLUCOSE: 152 MG/DL (ref 70–110)
POCT GLUCOSE: 176 MG/DL (ref 70–110)
POTASSIUM SERPL-SCNC: 3.7 MMOL/L
RBC # BLD AUTO: 2.78 M/UL
SODIUM SERPL-SCNC: 144 MMOL/L
VANCOMYCIN SERPL-MCNC: 13.7 UG/ML
WBC # BLD AUTO: 13.61 K/UL

## 2018-06-06 PROCEDURE — S0077 INJECTION, CLINDAMYCIN PHOSP: HCPCS | Performed by: INTERNAL MEDICINE

## 2018-06-06 PROCEDURE — 97535 SELF CARE MNGMENT TRAINING: CPT

## 2018-06-06 PROCEDURE — 99900035 HC TECH TIME PER 15 MIN (STAT)

## 2018-06-06 PROCEDURE — 63600175 PHARM REV CODE 636 W HCPCS: Performed by: EMERGENCY MEDICINE

## 2018-06-06 PROCEDURE — 80048 BASIC METABOLIC PNL TOTAL CA: CPT

## 2018-06-06 PROCEDURE — 25000003 PHARM REV CODE 250: Performed by: EMERGENCY MEDICINE

## 2018-06-06 PROCEDURE — 25000003 PHARM REV CODE 250: Performed by: INTERNAL MEDICINE

## 2018-06-06 PROCEDURE — 11000001 HC ACUTE MED/SURG PRIVATE ROOM

## 2018-06-06 PROCEDURE — 97116 GAIT TRAINING THERAPY: CPT

## 2018-06-06 PROCEDURE — 94640 AIRWAY INHALATION TREATMENT: CPT

## 2018-06-06 PROCEDURE — 85025 COMPLETE CBC W/AUTO DIFF WBC: CPT

## 2018-06-06 PROCEDURE — 80202 ASSAY OF VANCOMYCIN: CPT

## 2018-06-06 PROCEDURE — 36415 COLL VENOUS BLD VENIPUNCTURE: CPT

## 2018-06-06 PROCEDURE — 94761 N-INVAS EAR/PLS OXIMETRY MLT: CPT

## 2018-06-06 RX ORDER — CARVEDILOL 6.25 MG/1
25 TABLET ORAL 2 TIMES DAILY
Status: DISCONTINUED | OUTPATIENT
Start: 2018-06-06 | End: 2018-06-09 | Stop reason: HOSPADM

## 2018-06-06 RX ORDER — CLINDAMYCIN PHOSPHATE 600 MG/50ML
600 INJECTION, SOLUTION INTRAVENOUS
Status: DISCONTINUED | OUTPATIENT
Start: 2018-06-06 | End: 2018-06-09

## 2018-06-06 RX ADMIN — PIPERACILLIN SODIUM AND TAZOBACTAM SODIUM 4.5 G: 4; .5 INJECTION, POWDER, LYOPHILIZED, FOR SOLUTION INTRAVENOUS at 08:06

## 2018-06-06 RX ADMIN — FLUTICASONE FUROATE AND VILANTEROL TRIFENATATE 1 PUFF: 100; 25 POWDER RESPIRATORY (INHALATION) at 07:06

## 2018-06-06 RX ADMIN — PIPERACILLIN SODIUM AND TAZOBACTAM SODIUM 4.5 G: 4; .5 INJECTION, POWDER, LYOPHILIZED, FOR SOLUTION INTRAVENOUS at 12:06

## 2018-06-06 RX ADMIN — CLINDAMYCIN IN 5 PERCENT DEXTROSE 600 MG: 12 INJECTION, SOLUTION INTRAVENOUS at 10:06

## 2018-06-06 RX ADMIN — OXYCODONE HYDROCHLORIDE AND ACETAMINOPHEN 1 TABLET: 10; 325 TABLET ORAL at 07:06

## 2018-06-06 RX ADMIN — INSULIN ASPART 2 UNITS: 100 INJECTION, SOLUTION INTRAVENOUS; SUBCUTANEOUS at 04:06

## 2018-06-06 RX ADMIN — INSULIN ASPART 5 UNITS: 100 INJECTION, SOLUTION INTRAVENOUS; SUBCUTANEOUS at 04:06

## 2018-06-06 RX ADMIN — QUETIAPINE FUMARATE 100 MG: 100 TABLET ORAL at 08:06

## 2018-06-06 RX ADMIN — CLINDAMYCIN IN 5 PERCENT DEXTROSE 600 MG: 12 INJECTION, SOLUTION INTRAVENOUS at 03:06

## 2018-06-06 RX ADMIN — PANTOPRAZOLE SODIUM 40 MG: 40 TABLET, DELAYED RELEASE ORAL at 08:06

## 2018-06-06 RX ADMIN — OXYCODONE HYDROCHLORIDE AND ACETAMINOPHEN 1 TABLET: 10; 325 TABLET ORAL at 04:06

## 2018-06-06 RX ADMIN — CLINDAMYCIN IN 5 PERCENT DEXTROSE 600 MG: 12 INJECTION, SOLUTION INTRAVENOUS at 09:06

## 2018-06-06 RX ADMIN — OXYCODONE HYDROCHLORIDE AND ACETAMINOPHEN 1 TABLET: 10; 325 TABLET ORAL at 01:06

## 2018-06-06 RX ADMIN — CARVEDILOL 25 MG: 6.25 TABLET, FILM COATED ORAL at 09:06

## 2018-06-06 RX ADMIN — OXYCODONE HYDROCHLORIDE AND ACETAMINOPHEN 1 TABLET: 10; 325 TABLET ORAL at 08:06

## 2018-06-06 RX ADMIN — ATORVASTATIN CALCIUM 20 MG: 10 TABLET, FILM COATED ORAL at 09:06

## 2018-06-06 RX ADMIN — OXYCODONE HYDROCHLORIDE AND ACETAMINOPHEN 1 TABLET: 10; 325 TABLET ORAL at 11:06

## 2018-06-06 RX ADMIN — OXYCODONE HYDROCHLORIDE AND ACETAMINOPHEN 1 TABLET: 10; 325 TABLET ORAL at 12:06

## 2018-06-06 RX ADMIN — RAMELTEON 8 MG: 8 TABLET, FILM COATED ORAL at 10:06

## 2018-06-06 RX ADMIN — INSULIN ASPART 5 UNITS: 100 INJECTION, SOLUTION INTRAVENOUS; SUBCUTANEOUS at 08:06

## 2018-06-06 RX ADMIN — SODIUM CHLORIDE, SODIUM LACTATE, POTASSIUM CHLORIDE, AND CALCIUM CHLORIDE: .6; .31; .03; .02 INJECTION, SOLUTION INTRAVENOUS at 12:06

## 2018-06-06 RX ADMIN — GABAPENTIN 300 MG: 300 CAPSULE ORAL at 09:06

## 2018-06-06 RX ADMIN — INSULIN ASPART 5 UNITS: 100 INJECTION, SOLUTION INTRAVENOUS; SUBCUTANEOUS at 12:06

## 2018-06-06 NOTE — PT/OT/SLP PROGRESS
Occupational Therapy   Treatment    Name: Raissa Land  MRN: [id number]  Admitting Diagnosis:  Cellulitis of pubic region       Recommendations:     Discharge Recommendations: home health OT  Discharge Equipment Recommendations:  rollator  Barriers to discharge:  None    Subjective     Communicated with: nurse Maldonado prior to session.  Pain/Comfort:  · Pain Rating 1: 8/10  · Location - Side 1: Bilateral  · Pain Rating Post-Intervention 1: 8/10    Patients cultural, spiritual, Congregational conflicts given the current situation:      Objective:     Patient found with: peripheral IV    General Precautions: Standard, fall   Orthopedic Precautions:N/A   Braces: N/A     Occupational Performance:    Bed Mobility:    · Patient completed Scooting/Bridging with supervision  · Patient completed Supine to Sit with supervision     Functional Mobility/Transfers:  · Patient completed Sit <> Stand Transfer with supervision  with  rolling walker   · Patient completed Bed <> Chair Transfer using Stand Pivot technique with supervision with rolling walker  · Functional Mobility: Pt mobility is progressing with general mobility    Activities of Daily Living:  · Feeding:  modified independence    · UB Dressing: stand by assistance      Patient left up in chair with all lines intact    AMPA 6 Click:  AMPA Total Score: 17    Treatment & Education:  Green theraband exercises with 3 sets of 10  Education:    Assessment:     Raissa Land is a 67 y.o. female with a medical diagnosis of Cellulitis of pubic region.  She presents with imcreased functional mobility.  Performance deficits affecting function are weakness, impaired endurance, impaired self care skills, impaired functional mobilty, gait instability, pain.      Rehab Prognosis:  good; patient would benefit from acute skilled OT services to address these deficits and reach maximum level of function.       Plan:     Patient to be seen 3 x/week to address the above listed problems via  self-care/home management, therapeutic activities, therapeutic exercises  · Plan of Care Expires:    · Plan of Care Reviewed with: patient    This Plan of care has been discussed with the patient who was involved in its development and understands and is in agreement with the identified goals and treatment plan    GOALS:    Occupational Therapy Goals        Problem: Occupational Therapy Goal    Goal Priority Disciplines Outcome Interventions   Occupational Therapy Goal     OT, PT/OT Ongoing (interventions implemented as appropriate)    Description:  Goals to be met by: 6/14/18     Patient will increase functional independence with ADLs by performing:    Feeding with Modified [place].  UE Dressing with Modified [place].  LE Dressing with Supervision.  Grooming while standing with Set-up Assistance.  Toileting from toilet with Set-up Assistance for hygiene and clothing management.   Supine to sit with Set-up Assistance.  Toilet transfer to toilet with Supervision.  Upper extremity exercise program x20 reps per handout, with independence.                      Time Tracking:     OT Date of Treatment: 06/06/18  OT Start Time: 1450  OT Stop Time: 1515  OT Total Time (min): 25 min    Billable Minutes:Self Care/Home Management 25    Jannet Jung OT  6/6/2018

## 2018-06-06 NOTE — ASSESSMENT & PLAN NOTE
She met criteria for sepsis given tachycardia, leukocytosis, and cellulitis.  Blood and urine cultures NGTD; IV fluid hydration and antibiotics.

## 2018-06-06 NOTE — PLAN OF CARE
Problem: Occupational Therapy Goal  Goal: Occupational Therapy Goal  Goals to be met by: 6/14/18     Patient will increase functional independence with ADLs by performing:    Feeding with Modified Dickinson.  UE Dressing with Modified Dickinson.  LE Dressing with Supervision.  Grooming while standing with Set-up Assistance.  Toileting from toilet with Set-up Assistance for hygiene and clothing management.   Supine to sit with Set-up Assistance.  Toilet transfer to toilet with Supervision.  Upper extremity exercise program x20 reps per handout, with independence.     Outcome: Ongoing (interventions implemented as appropriate)  Pt able to tolerate OOB activity in bedside chair

## 2018-06-06 NOTE — ASSESSMENT & PLAN NOTE
"Patient has been having pubic pain for the past two weeks and a CT-renal study was revealing for "[s]oft tissue induration in the region of the mons...".  She met criteria for severe sepsis and was started empirically on vancomycin and piperacillin/tazobactam.  Her lactic acid was normal. Held antihypertensives for low BP.  Blood and urine cultures NGTD.  There was no evidence of abscess on physical exam or CT. Still complaining of pain 6/6 and abx changed to clindamycin.  "

## 2018-06-06 NOTE — PLAN OF CARE
Problem: Patient Care Overview  Goal: Plan of Care Review  Patient sats 97% on RA/ not in any distress

## 2018-06-06 NOTE — PLAN OF CARE
Problem: Physical Therapy Goal  Goal: Physical Therapy Goal  Goals to be met by: 18    Patient will increase functional independence with mobility by performin. Supine to sit with supervision  2. Sit to stand transfer with Supervision  3. Gait  x 150 feet with Supervision using Rolling Walker  4. Lower extremity exercise program x30 reps with supervision     Outcome: Ongoing (interventions implemented as appropriate)    Patient continues to be limited in ambulation distance due to pelvic pain.

## 2018-06-06 NOTE — ASSESSMENT & PLAN NOTE
With reported bright red blood per rectum prior to admission. Hgb 7.9 g/dL on admit which is lower than baseline. No upper GI symptoms. GI consulted. Held heparin and aspirin. Stable for outpatient work up once cellulitis resolves.

## 2018-06-06 NOTE — PLAN OF CARE
Problem: Patient Care Overview  Goal: Plan of Care Review  Outcome: Ongoing (interventions implemented as appropriate)  Pt free of accidental injury during shift. No s/s of distress noted. C/O constant pain to pelvic area. Medicated as prescribed. Urinated frequently during shift. IVF and abt in progress. Tolerating diet well. Safety measures maintained. Will continue to monitor

## 2018-06-06 NOTE — PLAN OF CARE
Problem: Patient Care Overview  Goal: Plan of Care Review  Patient's pain controlled well with PRN medication. Patient voiding frequently with standby assistance to bedside commode. No new signs/symptoms of infection noted. Patient adhering to fall risk protocol. No acute distress noted at this time Will continue to monitor closely.

## 2018-06-06 NOTE — SUBJECTIVE & OBJECTIVE
Interval History: persistent suprapubic pain.     Review of Systems   Constitutional: Negative for activity change, appetite change, chills, diaphoresis, fatigue, fever and unexpected weight change.   HENT: Negative.    Eyes: Negative.    Respiratory: Negative for cough, chest tightness, shortness of breath and wheezing.    Cardiovascular: Negative for chest pain, palpitations and leg swelling.   Gastrointestinal: Negative for abdominal distention, abdominal pain, blood in stool, constipation, diarrhea, nausea and vomiting.   Genitourinary: Negative for dysuria and hematuria.        Suprapubic pain   Musculoskeletal: Negative.    Skin: Negative.    Neurological: Negative for dizziness, seizures, syncope, weakness and light-headedness.   Psychiatric/Behavioral: Negative.      Objective:     Vital Signs (Most Recent):  Temp: 98.2 °F (36.8 °C) (06/06/18 1143)  Pulse: 82 (06/06/18 1143)  Resp: 18 (06/06/18 1143)  BP: (!) 141/64 (06/06/18 1143)  SpO2: (!) 93 % (06/06/18 1143) Vital Signs (24h Range):  Temp:  [98.2 °F (36.8 °C)-98.6 °F (37 °C)] 98.2 °F (36.8 °C)  Pulse:  [78-96] 82  Resp:  [12-18] 18  SpO2:  [92 %-97 %] 93 %  BP: (120-162)/(59-74) 141/64     Weight: 86.2 kg (190 lb)  Body mass index is 37.11 kg/m².    Intake/Output Summary (Last 24 hours) at 06/06/18 1539  Last data filed at 06/06/18 1313   Gross per 24 hour   Intake          3323.34 ml   Output             1525 ml   Net          1798.34 ml      Physical Exam   Constitutional: She is oriented to person, place, and time. She appears well-developed. No distress.   HENT:   Head: Normocephalic and atraumatic.   Right Ear: External ear normal.   Left Ear: External ear normal.   Nose: Nose normal.   Eyes: Right eye exhibits no discharge. Left eye exhibits no discharge.   Neck: Normal range of motion.   Cardiovascular: Normal rate, regular rhythm, normal heart sounds and intact distal pulses.  Exam reveals no gallop and no friction rub.    No murmur  heard.  Pulmonary/Chest: Effort normal and breath sounds normal. No respiratory distress. She has no wheezes. She has no rales. She exhibits no tenderness.   Abdominal: Soft. Bowel sounds are normal. She exhibits no distension. There is no tenderness. There is no rebound and no guarding.   Genitourinary:   Genitourinary Comments: Mild tenderness to palpation to the supra pubic area. No induration. No fluctuance. No erythema   Musculoskeletal: Normal range of motion. She exhibits no edema.   Neurological: She is alert and oriented to person, place, and time.   Skin: Skin is warm and dry. She is not diaphoretic. No erythema.   Nursing note and vitals reviewed.      Significant Labs: All pertinent labs within the past 24 hours have been reviewed.    Significant Imaging: I have reviewed all pertinent imaging results/findings within the past 24 hours.  I have reviewed and interpreted all pertinent imaging results/findings within the past 24 hours.

## 2018-06-06 NOTE — PROGRESS NOTES
[facility] Hospital Medicine  Progress Note    Patient Name: Raissa Land  MRN: [id number]  Patient Class: IP- Inpatient   Admission Date: 6/3/2018  Length of Stay: 3 days  Attending Physician: Jeanie Pitts MD  Primary Care Provider: Guerrero Hensley Jr, MD        Subjective:     Principal Problem:Cellulitis of pubic region    HPI:  Ms. Raissa Land is a 67 y.o. female with essential hypertension, hyperlipidemia (.6 Jan 2018), type 2 diabetes mellitus (HbA1c 7.0% Jan 2018), CAD, chronic combined systolic and diastolic heart failure (LVEF 35-40% Jan 2018), COPD, anemia of chronic disease, GERD, tobacco abuse, and depression who presented initially to [facility] ED with complaints of pelvic pain for two weeks.  She reports that it's been making it very difficult for her to walk without a walk due to the pain.  The pain is localized to the suprapubic area but feels a little deeper.  She did not have any recent trauma to the area.  She denies any fevers, chills, nausea, vomiting, abdominal pain, diarrhea, dysuria, nor any hematuria.  She otherwise has been feeling well with a normal appetite.    Hospital Course:  Ms. Land presented with sepsis secondary to suprapubic induration without evidence of abscess on physical exam or CT or any erythema. She was hypotensive and with a lot of pain. She also reported bright red blood per rectum. She does have external hemorrhoids. Blood did not recur while inpatient but Hgb was 7.9 g/dL on presentation. She was started on pip-tazo and vanc for sepsis. Cultures NGTD. GI consulted for blood per rectum and anemia. Her H&H was stable and GI recommended outpatient work up once cellulitis resolved as she does not feel she could tolerate a prep at this time. She continued to complain of pain despite appearing clinically stable. Antibiotics were changed to clindamycin 6/6/2018.    Interval History: persistent suprapubic pain.     Review of Systems    Constitutional: Negative for activity change, appetite change, chills, diaphoresis, fatigue, fever and unexpected weight change.   HENT: Negative.    Eyes: Negative.    Respiratory: Negative for cough, chest tightness, shortness of breath and wheezing.    Cardiovascular: Negative for chest pain, palpitations and leg swelling.   Gastrointestinal: Negative for abdominal distention, abdominal pain, blood in stool, constipation, diarrhea, nausea and vomiting.   Genitourinary: Negative for dysuria and hematuria.        Suprapubic pain   Musculoskeletal: Negative.    Skin: Negative.    Neurological: Negative for dizziness, seizures, syncope, weakness and light-headedness.   Psychiatric/Behavioral: Negative.      Objective:     Vital Signs (Most Recent):  Temp: 98.2 °F (36.8 °C) (06/06/18 1143)  Pulse: 82 (06/06/18 1143)  Resp: 18 (06/06/18 1143)  BP: (!) 141/64 (06/06/18 1143)  SpO2: (!) 93 % (06/06/18 1143) Vital Signs (24h Range):  Temp:  [98.2 °F (36.8 °C)-98.6 °F (37 °C)] 98.2 °F (36.8 °C)  Pulse:  [78-96] 82  Resp:  [12-18] 18  SpO2:  [92 %-97 %] 93 %  BP: (120-162)/(59-74) 141/64     Weight: 86.2 kg (190 lb)  Body mass index is 37.11 kg/m².    Intake/Output Summary (Last 24 hours) at 06/06/18 1539  Last data filed at 06/06/18 1313   Gross per 24 hour   Intake          3323.34 ml   Output             1525 ml   Net          1798.34 ml      Physical Exam   Constitutional: She is oriented to person, place, and time. She appears well-developed. No distress.   HENT:   Head: Normocephalic and atraumatic.   Right Ear: External ear normal.   Left Ear: External ear normal.   Nose: Nose normal.   Eyes: Right eye exhibits no discharge. Left eye exhibits no discharge.   Neck: Normal range of motion.   Cardiovascular: Normal rate, regular rhythm, normal heart sounds and intact distal pulses.  Exam reveals no gallop and no friction rub.    No murmur heard.  Pulmonary/Chest: Effort normal and breath sounds normal. No respiratory  "distress. She has no wheezes. She has no rales. She exhibits no tenderness.   Abdominal: Soft. Bowel sounds are normal. She exhibits no distension. There is no tenderness. There is no rebound and no guarding.   Genitourinary:   Genitourinary Comments: Mild tenderness to palpation to the supra pubic area. No induration. No fluctuance. No erythema   Musculoskeletal: Normal range of motion. She exhibits no edema.   Neurological: She is alert and oriented to person, place, and time.   Skin: Skin is warm and dry. She is not diaphoretic. No erythema.   Nursing note and vitals reviewed.      Significant Labs: All pertinent labs within the past 24 hours have been reviewed.    Significant Imaging: I have reviewed all pertinent imaging results/findings within the past 24 hours.  I have reviewed and interpreted all pertinent imaging results/findings within the past 24 hours.    Assessment/Plan:      * Cellulitis of pubic region    Patient has been having pubic pain for the past two weeks and a CT-renal study was revealing for "[s]oft tissue induration in the region of the mons...".  She met criteria for severe sepsis and was started empirically on vancomycin and piperacillin/tazobactam.  Her lactic acid was normal. Held antihypertensives for low BP.  Blood and urine cultures NGTD.  There was no evidence of abscess on physical exam or CT. Still complaining of pain 6/6 and abx changed to clindamycin.        Severe sepsis    She met criteria for sepsis given tachycardia, leukocytosis, and cellulitis.  Blood and urine cultures NGTD; IV fluid hydration and antibiotics.        Anemia associated with acute blood loss    With reported bright red blood per rectum prior to admission. Hgb 7.9 g/dL on admit which is lower than baseline. No upper GI symptoms. GI consulted. Held heparin and aspirin. Stable for outpatient work up once cellulitis resolves.        Acute renal failure    Improved with IV fluids.         Depression    Stable; " will continue her home regimen of sertraline.        GERD (gastroesophageal reflux disease)    Will continue her home regimen of pantoprazole.        Hyperlipidemia    Poorly-controlled; will continue patient's home regimen of atorvastatin.        Chronic combined systolic and diastolic heart failure    Stable without evidence of acute heart failure        Coronary artery disease involving native coronary artery of native heart without angina pectoris    Stable; will continue her home regimen of atorvastatin. Hold antihypertensives due to low BP        Tobacco abuse    Patient was counseled on smoking cessation and she will be provided a nicotine transdermal patch applied while inpatient.         Type 2 diabetes mellitus, controlled    Basal-prandial insulin along with insulin sliding scale.        COPD (chronic obstructive pulmonary disease)    Clinically-stable without wheezing.  Will continue home regimen of ICS/LABA and have as-needed SEAN/LAMA available.        Essential hypertension, benign    Patient's blood pressure was low. Held antihypertensives. Restarted as tolerated.          VTE Risk Mitigation         Ordered     Place GENARO hose  Until discontinued      06/04/18 1649     Place sequential compression device  Until discontinued      06/04/18 1649     IP VTE HIGH RISK PATIENT  Once      06/03/18 2110              Jeanie Pitts MD  Department of Hospital Medicine   [facility]

## 2018-06-06 NOTE — PT/OT/SLP PROGRESS
Physical Therapy Treatment    Patient Name:  Raissa Land   MRN:  [id number]    Recommendations:     Discharge Recommendations:  home health PT   Discharge Equipment Recommendations: rollator   Barriers to discharge: limited functional mobility due to pain in pelvis with weight bearing    Assessment:     Raissa Land is a 67 y.o. female admitted with a medical diagnosis of Cellulitis of pubic region.  She presents with the following impairments/functional limitations:  weakness, impaired functional mobilty, gait instability, impaired balance, pain, decreased ROM, decreased lower extremity function. Patient continues to be limited in ambulation distance due to pelvic pain.    Rehab Prognosis:  good; patient would benefit from acute skilled PT services to address these deficits and reach maximum level of function.      Recent Surgery: * No surgery found *      Plan:     During this hospitalization, patient to be seen 5 x/week to address the above listed problems via gait training, therapeutic activities, therapeutic exercises  · Plan of Care Expires:  06/19/18   Plan of Care Reviewed with: patient    Subjective     Communicated with nurse Maldonado prior to session.  Patient found seated in bedside chair upon PT entry to room, agreeable to treatment.      Chief Complaint: Pain with weight bearing.   Patient comments/goals: To get rid of pain.   Pain/Comfort:  · Pain Rating 1: 7/10  · Location - Side 1: Bilateral  · Location 1:  (pelvis)  · Pain Addressed 1: Pre-medicate for activity, Cessation of Activity, Nurse notified  · Pain Rating Post-Intervention 1: 7/10    Objective:     Patient found with: peripheral IV     General Precautions: Standard, fall   Orthopedic Precautions:N/A   Braces: N/A     Functional Mobility:  · Bed Mobility:     · Sit to Supine: minimum assistance for R LE management  · Transfers:     · Sit to Stand:  stand by assistance with rolling walker  · Gait: Patient ambulated 16ft with Rolling Walker and SBA  using 3-point gait. Patient demonstrated decreased samina, decreased velocity of limb motion, decreased step length, decreased toe-to-floor clearance and decreased weight-shifting ability during gait due to impaired balance, pain and decreased strength.    AM-PAC 6 CLICK MOBILITY  Turning over in bed (including adjusting bedclothes, sheets and blankets)?: 4  Sitting down on and standing up from a chair with arms (e.g., wheelchair, bedside commode, etc.): 4  Moving from lying on back to sitting on the side of the bed?: 3  Moving to and from a bed to a chair (including a wheelchair)?: 3  Need to walk in hospital room?: 3  Climbing 3-5 steps with a railing?: 1  Total Score: 18     Patient left supine with all lines intact, call button in reach and nurse Erica notified.    GOALS:    Physical Therapy Goals        Problem: Physical Therapy Goal    Goal Priority Disciplines Outcome Goal Variances Interventions   Physical Therapy Goal     PT/OT, PT Ongoing (interventions implemented as appropriate)     Description:  Goals to be met by: 18    Patient will increase functional independence with mobility by performin. Supine to sit with supervision  2. Sit to stand transfer with Supervision  3. Gait  x 150 feet with Supervision using Rolling Walker  4. Lower extremity exercise program x30 reps with supervision                      Time Tracking:     PT Received On: 18  PT Start Time: 1608     PT Stop Time: 1616  PT Total Time (min): 8 min     Billable Minutes: Gait Training  8    Treatment Type: Treatment  PT/PTA: PT     PTA Visit Number: 0     Annie Zapata, PT  2018

## 2018-06-06 NOTE — PROGRESS NOTES
The patient denies having any rectal bleeding overnight.  She still has pelvic pain from her cellulitis.    Vitals:    06/05/18 1933 06/05/18 1946 06/05/18 2344 06/06/18 0409   BP: (!) 162/74  (!) 153/66 (!) 120/59   BP Location: Left arm  Left arm Left arm   Patient Position: Lying  Lying Lying   Pulse: 96  89 88   Resp: 18 18 18   Temp: 98.6 °F (37 °C)  98.3 °F (36.8 °C) 98.6 °F (37 °C)   TempSrc: Oral  Oral Oral   SpO2: 95% 97% (!) 92% (!) 93%   Weight:       Height:          atorvastatin  20 mg Oral QHS    fluticasone-vilanterol  1 puff Inhalation Daily    gabapentin  300 mg Oral QHS    insulin aspart U-100  5 Units Subcutaneous TIDWM    insulin detemir U-100  15 Units Subcutaneous QHS    nicotine  1 patch Transdermal Daily    pantoprazole  40 mg Oral Daily    piperacillin-tazobactam (ZOSYN) IVPB  4.5 g Intravenous Q8H    QUEtiapine  100 mg Oral Daily    senna-docusate 8.6-50 mg  2 tablet Oral BID    sertraline  100 mg Oral Daily     P.E.:  GEN: A x O x 3, NAD  HEENT: EOMI, PERRL, anicteric sclera  CV: RRR, no M/R/G  Chest: CTA B  Abdomen: soft, NTND, normoactive BS  Ext: No C/C/E. 2+ dorsalis pedis pulses B    Labs:    Recent Results (from the past 336 hour(s))   CBC auto differential    Collection Time: 06/05/18  6:16 AM   Result Value Ref Range    WBC 14.31 (H) 3.90 - 12.70 K/uL    Hemoglobin 7.6 (L) 12.0 - 16.0 g/dL    Hematocrit 23.4 (L) 37.0 - 48.5 %    Platelets 374 (H) 150 - 350 K/uL   CBC auto differential    Collection Time: 06/04/18  5:05 AM   Result Value Ref Range    WBC 14.11 (H) 3.90 - 12.70 K/uL    Hemoglobin 7.9 (L) 12.0 - 16.0 g/dL    Hematocrit 24.6 (L) 37.0 - 48.5 %    Platelets 326 150 - 350 K/uL     CMP  Sodium   Date Value Ref Range Status   06/05/2018 144 136 - 145 mmol/L Final     Potassium   Date Value Ref Range Status   06/05/2018 3.6 3.5 - 5.1 mmol/L Final     Chloride   Date Value Ref Range Status   06/05/2018 110 95 - 110 mmol/L Final     CO2   Date Value Ref Range  Status   06/05/2018 28 23 - 29 mmol/L Final     Glucose   Date Value Ref Range Status   06/05/2018 74 70 - 110 mg/dL Final     BUN, Bld   Date Value Ref Range Status   06/05/2018 17 8 - 23 mg/dL Final     Creatinine   Date Value Ref Range Status   06/05/2018 1.2 0.5 - 1.4 mg/dL Final     Calcium   Date Value Ref Range Status   06/05/2018 8.7 8.7 - 10.5 mg/dL Final     Total Protein   Date Value Ref Range Status   06/04/2018 5.7 (L) 6.0 - 8.4 g/dL Final     Albumin   Date Value Ref Range Status   06/05/2018 2.4 (L) 3.5 - 5.2 g/dL Final     Total Bilirubin   Date Value Ref Range Status   06/04/2018 0.4 0.1 - 1.0 mg/dL Final     Comment:     For infants and newborns, interpretation of results should be based  on gestational age, weight and in agreement with clinical  observations.  Premature Infant recommended reference ranges:  Up to 24 hours.............<8.0 mg/dL  Up to 48 hours............<12.0 mg/dL  3-5 days..................<15.0 mg/dL  6-29 days.................<15.0 mg/dL       Alkaline Phosphatase   Date Value Ref Range Status   06/04/2018 364 (H) 55 - 135 U/L Final     AST   Date Value Ref Range Status   06/04/2018 33 10 - 40 U/L Final     ALT   Date Value Ref Range Status   06/04/2018 42 10 - 44 U/L Final     Anion Gap   Date Value Ref Range Status   06/05/2018 6 (L) 8 - 16 mmol/L Final     eGFR if    Date Value Ref Range Status   06/05/2018 54 (A) >60 mL/min/1.73 m^2 Final     eGFR if non    Date Value Ref Range Status   06/05/2018 47 (A) >60 mL/min/1.73 m^2 Final     Comment:     Calculation used to obtain the estimated glomerular filtration  rate (eGFR) is the CKD-EPI equation.          No results for input(s): PT, INR, APTT in the last 24 hours.    CT of Abdomen/Pelvis:  1. Stable prominence of the common duct, without intraluminal filling defect.  ERCP as warranted.  2. Multiple colonic diverticula without findings to suggest diverticulitis.  3. Soft tissue induration in  the region of the mons, nonspecific, correlation with any focal tenderness or superficial cellulitis as warranted.  4. Several additional findings above.    A/P:  The patient is a 67 year old woman with a history of HTN, mitral valve prolapse, NSTEMI, DM, arthritis, and asthma presenting with cellulitis in the pubic region and hematochezia.  1.  Hematochezia - she has external hemorrhoids and preparation H was offered, but she does not want to take this, stating that she has never had issues with hemorrhoids.  An EGD on 1/10/18 showed a hiatal hernia, gastritis, and duodenal ulcers.  Pathology was negative for Helicobacter pylori.  Her last colonoscopy was 10 years ago.  Labs from today, including an H/H, are pending, however, she states she has not had any further bleeding.  The patient can follow-up in GI Clinic and undergo an outpatient colonoscopy once her pubic cellulitis resolves as she feels like she will not be able to tolerate a golytely prep with her cellulitis.  She can also undergo an outpatient EUS for her prominent common bile duct, which has been stable over time.  Please call GI with any further issues.

## 2018-06-07 LAB
ANION GAP SERPL CALC-SCNC: 5 MMOL/L
BASOPHILS # BLD AUTO: 0.02 K/UL
BASOPHILS NFR BLD: 0.2 %
BUN SERPL-MCNC: 7 MG/DL
CALCIUM SERPL-MCNC: 9.3 MG/DL
CHLORIDE SERPL-SCNC: 111 MMOL/L
CO2 SERPL-SCNC: 32 MMOL/L
CREAT SERPL-MCNC: 1 MG/DL
DIFFERENTIAL METHOD: ABNORMAL
EOSINOPHIL # BLD AUTO: 0.1 K/UL
EOSINOPHIL NFR BLD: 1 %
ERYTHROCYTE [DISTWIDTH] IN BLOOD BY AUTOMATED COUNT: 15.2 %
EST. GFR  (AFRICAN AMERICAN): >60 ML/MIN/1.73 M^2
EST. GFR  (NON AFRICAN AMERICAN): 58 ML/MIN/1.73 M^2
GLUCOSE SERPL-MCNC: 112 MG/DL
HCT VFR BLD AUTO: 22.8 %
HGB BLD-MCNC: 7.4 G/DL
LYMPHOCYTES # BLD AUTO: 2.4 K/UL
LYMPHOCYTES NFR BLD: 18.2 %
MCH RBC QN AUTO: 26.1 PG
MCHC RBC AUTO-ENTMCNC: 32.5 G/DL
MCV RBC AUTO: 80 FL
MONOCYTES # BLD AUTO: 0.8 K/UL
MONOCYTES NFR BLD: 6 %
NEUTROPHILS # BLD AUTO: 10 K/UL
NEUTROPHILS NFR BLD: 74.6 %
PLATELET # BLD AUTO: 429 K/UL
PMV BLD AUTO: 9.6 FL
POCT GLUCOSE: 115 MG/DL (ref 70–110)
POCT GLUCOSE: 131 MG/DL (ref 70–110)
POCT GLUCOSE: 145 MG/DL (ref 70–110)
POCT GLUCOSE: 172 MG/DL (ref 70–110)
POTASSIUM SERPL-SCNC: 4.3 MMOL/L
RBC # BLD AUTO: 2.84 M/UL
SODIUM SERPL-SCNC: 148 MMOL/L
WBC # BLD AUTO: 13.33 K/UL

## 2018-06-07 PROCEDURE — 97116 GAIT TRAINING THERAPY: CPT

## 2018-06-07 PROCEDURE — 85025 COMPLETE CBC W/AUTO DIFF WBC: CPT

## 2018-06-07 PROCEDURE — 11000001 HC ACUTE MED/SURG PRIVATE ROOM

## 2018-06-07 PROCEDURE — S0077 INJECTION, CLINDAMYCIN PHOSP: HCPCS | Performed by: INTERNAL MEDICINE

## 2018-06-07 PROCEDURE — 25500020 PHARM REV CODE 255: Performed by: INTERNAL MEDICINE

## 2018-06-07 PROCEDURE — 25000003 PHARM REV CODE 250: Performed by: INTERNAL MEDICINE

## 2018-06-07 PROCEDURE — 94761 N-INVAS EAR/PLS OXIMETRY MLT: CPT

## 2018-06-07 PROCEDURE — 80048 BASIC METABOLIC PNL TOTAL CA: CPT

## 2018-06-07 PROCEDURE — 94640 AIRWAY INHALATION TREATMENT: CPT

## 2018-06-07 PROCEDURE — 36415 COLL VENOUS BLD VENIPUNCTURE: CPT

## 2018-06-07 RX ORDER — HYDRALAZINE HYDROCHLORIDE 20 MG/ML
10 INJECTION INTRAMUSCULAR; INTRAVENOUS EVERY 4 HOURS PRN
Status: DISCONTINUED | OUTPATIENT
Start: 2018-06-07 | End: 2018-06-09 | Stop reason: HOSPADM

## 2018-06-07 RX ADMIN — CARVEDILOL 25 MG: 6.25 TABLET, FILM COATED ORAL at 09:06

## 2018-06-07 RX ADMIN — GABAPENTIN 300 MG: 300 CAPSULE ORAL at 09:06

## 2018-06-07 RX ADMIN — QUETIAPINE FUMARATE 100 MG: 100 TABLET ORAL at 08:06

## 2018-06-07 RX ADMIN — CLINDAMYCIN IN 5 PERCENT DEXTROSE 600 MG: 12 INJECTION, SOLUTION INTRAVENOUS at 04:06

## 2018-06-07 RX ADMIN — CLINDAMYCIN IN 5 PERCENT DEXTROSE 600 MG: 12 INJECTION, SOLUTION INTRAVENOUS at 10:06

## 2018-06-07 RX ADMIN — INSULIN ASPART 5 UNITS: 100 INJECTION, SOLUTION INTRAVENOUS; SUBCUTANEOUS at 08:06

## 2018-06-07 RX ADMIN — PANTOPRAZOLE SODIUM 40 MG: 40 TABLET, DELAYED RELEASE ORAL at 08:06

## 2018-06-07 RX ADMIN — INSULIN ASPART 2 UNITS: 100 INJECTION, SOLUTION INTRAVENOUS; SUBCUTANEOUS at 12:06

## 2018-06-07 RX ADMIN — IOHEXOL 100 ML: 350 INJECTION, SOLUTION INTRAVENOUS at 03:06

## 2018-06-07 RX ADMIN — CLINDAMYCIN IN 5 PERCENT DEXTROSE 600 MG: 12 INJECTION, SOLUTION INTRAVENOUS at 09:06

## 2018-06-07 RX ADMIN — CARVEDILOL 25 MG: 6.25 TABLET, FILM COATED ORAL at 08:06

## 2018-06-07 RX ADMIN — OXYCODONE HYDROCHLORIDE AND ACETAMINOPHEN 1 TABLET: 10; 325 TABLET ORAL at 04:06

## 2018-06-07 RX ADMIN — INSULIN ASPART 5 UNITS: 100 INJECTION, SOLUTION INTRAVENOUS; SUBCUTANEOUS at 12:06

## 2018-06-07 RX ADMIN — FLUTICASONE FUROATE AND VILANTEROL TRIFENATATE 1 PUFF: 100; 25 POWDER RESPIRATORY (INHALATION) at 08:06

## 2018-06-07 RX ADMIN — OXYCODONE HYDROCHLORIDE AND ACETAMINOPHEN 1 TABLET: 10; 325 TABLET ORAL at 09:06

## 2018-06-07 RX ADMIN — OXYCODONE HYDROCHLORIDE AND ACETAMINOPHEN 1 TABLET: 10; 325 TABLET ORAL at 05:06

## 2018-06-07 RX ADMIN — CLINDAMYCIN IN 5 PERCENT DEXTROSE 600 MG: 12 INJECTION, SOLUTION INTRAVENOUS at 05:06

## 2018-06-07 RX ADMIN — ATORVASTATIN CALCIUM 20 MG: 10 TABLET, FILM COATED ORAL at 09:06

## 2018-06-07 RX ADMIN — INSULIN ASPART 5 UNITS: 100 INJECTION, SOLUTION INTRAVENOUS; SUBCUTANEOUS at 04:06

## 2018-06-07 NOTE — ASSESSMENT & PLAN NOTE
"Patient has been having pubic pain for the past two weeks and a CT-renal study was revealing for "[s]oft tissue induration in the region of the mons...".  She met criteria for severe sepsis and was started empirically on vancomycin and piperacillin/tazobactam.  Her lactic acid was normal. Held antihypertensives for low BP.  Blood and urine cultures NGTD.  There was no evidence of abscess on physical exam or CT. Still complaining of pain 6/6 and abx changed to clindamycin. Still with persistent pain despite being otherwise stable. Repeat CT 6/7/2018.   "

## 2018-06-07 NOTE — PROGRESS NOTES
[facility] Hospital Medicine  Progress Note    Patient Name: Raissa Land  MRN: [id number]  Patient Class: IP- Inpatient   Admission Date: 6/3/2018  Length of Stay: 4 days  Attending Physician: Jeanie Pitts MD  Primary Care Provider: Guerrero Hensley Jr, MD        Subjective:     Principal Problem:Cellulitis of pubic region    HPI:  Ms. Raissa Land is a 67 y.o. female with essential hypertension, hyperlipidemia (.6 Jan 2018), type 2 diabetes mellitus (HbA1c 7.0% Jan 2018), CAD, chronic combined systolic and diastolic heart failure (LVEF 35-40% Jan 2018), COPD, anemia of chronic disease, GERD, tobacco abuse, and depression who presented initially to [facility] ED with complaints of pelvic pain for two weeks.  She reports that it's been making it very difficult for her to walk without a walk due to the pain.  The pain is localized to the suprapubic area but feels a little deeper.  She did not have any recent trauma to the area.  She denies any fevers, chills, nausea, vomiting, abdominal pain, diarrhea, dysuria, nor any hematuria.  She otherwise has been feeling well with a normal appetite.    Hospital Course:  Ms. Land presented with sepsis secondary to suprapubic induration without evidence of abscess on physical exam or CT or any erythema. She was hypotensive and with a lot of pain. She also reported bright red blood per rectum. She does have external hemorrhoids. Blood did not recur while inpatient but Hgb was 7.9 g/dL on presentation. She was started on pip-tazo and vanc for sepsis. Cultures NGTD. GI consulted for blood per rectum and anemia. Her H&H was stable and GI recommended outpatient work up once cellulitis resolved as she does not feel she could tolerate a prep at this time. She continued to complain of pain despite appearing clinically stable. Antibiotics were changed to clindamycin 6/6/2018. CT A/P ordered 6/7 for persistent pain. Ob/gyn also consulted.    Interval History:  persistent suprapubic pain.     Review of Systems   Constitutional: Negative for activity change, appetite change, chills, diaphoresis, fatigue, fever and unexpected weight change.   HENT: Negative.    Eyes: Negative.    Respiratory: Negative for cough, chest tightness, shortness of breath and wheezing.    Cardiovascular: Negative for chest pain, palpitations and leg swelling.   Gastrointestinal: Negative for abdominal distention, abdominal pain, blood in stool, constipation, diarrhea, nausea and vomiting.   Genitourinary: Negative for dysuria and hematuria.        Suprapubic pain   Musculoskeletal: Negative.    Skin: Negative.    Neurological: Negative for dizziness, seizures, syncope, weakness and light-headedness.   Psychiatric/Behavioral: Negative.      Objective:     Vital Signs (Most Recent):  Temp: 98.7 °F (37.1 °C) (06/07/18 0754)  Pulse: 75 (06/07/18 0853)  Resp: 18 (06/07/18 0853)  BP: 127/61 (06/07/18 0754)  SpO2: (!) 92 % (06/07/18 0853) Vital Signs (24h Range):  Temp:  [98.6 °F (37 °C)-99.3 °F (37.4 °C)] 98.7 °F (37.1 °C)  Pulse:  [70-93] 75  Resp:  [18] 18  SpO2:  [92 %-96 %] 92 %  BP: (125-154)/(61-72) 127/61     Weight: 86.2 kg (190 lb)  Body mass index is 37.11 kg/m².    Intake/Output Summary (Last 24 hours) at 06/07/18 1422  Last data filed at 06/07/18 1200   Gross per 24 hour   Intake              440 ml   Output              550 ml   Net             -110 ml      Physical Exam   Constitutional: She is oriented to person, place, and time. She appears well-developed. No distress.   HENT:   Head: Normocephalic and atraumatic.   Right Ear: External ear normal.   Left Ear: External ear normal.   Nose: Nose normal.   Eyes: Right eye exhibits no discharge. Left eye exhibits no discharge.   Neck: Normal range of motion.   Cardiovascular: Normal rate, regular rhythm, normal heart sounds and intact distal pulses.  Exam reveals no gallop and no friction rub.    No murmur heard.  Pulmonary/Chest: Effort  "normal and breath sounds normal. No respiratory distress. She has no wheezes. She has no rales. She exhibits no tenderness.   Abdominal: Soft. Bowel sounds are normal. She exhibits no distension. There is no tenderness. There is no rebound and no guarding.   Genitourinary:   Genitourinary Comments: Mild tenderness to palpation to the supra pubic area. No induration. No fluctuance. No erythema   Musculoskeletal: Normal range of motion. She exhibits no edema.   Neurological: She is alert and oriented to person, place, and time.   Skin: Skin is warm and dry. She is not diaphoretic. No erythema.   Nursing note and vitals reviewed.      Significant Labs: All pertinent labs within the past 24 hours have been reviewed.    Significant Imaging: I have reviewed all pertinent imaging results/findings within the past 24 hours.  I have reviewed and interpreted all pertinent imaging results/findings within the past 24 hours.    Assessment/Plan:      * Cellulitis of pubic region    Patient has been having pubic pain for the past two weeks and a CT-renal study was revealing for "[s]oft tissue induration in the region of the mons...".  She met criteria for severe sepsis and was started empirically on vancomycin and piperacillin/tazobactam.  Her lactic acid was normal. Held antihypertensives for low BP.  Blood and urine cultures NGTD.  There was no evidence of abscess on physical exam or CT. Still complaining of pain 6/6 and abx changed to clindamycin. Still with persistent pain despite being otherwise stable. Repeat CT 6/7/2018.         Severe sepsis    She met criteria for sepsis given tachycardia, leukocytosis, and cellulitis.  Blood and urine cultures NGTD; IV fluid hydration and antibiotics. Tachycardia improved with IV fluid resuscitation. She has a chronic leukocytosis. BCx NGTD. Not clear if this was truly sepsis of just volume depletion.        Anemia associated with acute blood loss    With reported bright red blood per " rectum prior to admission. Hgb 7.9 g/dL on admit which is lower than baseline. No upper GI symptoms. GI consulted. Held heparin and aspirin. Stable for outpatient work up once acute issue resolves.        Acute renal failure    Improved with IV fluids.         Depression    Stable; will continue her home regimen of sertraline.        GERD (gastroesophageal reflux disease)    Will continue her home regimen of pantoprazole.        Hyperlipidemia    Poorly-controlled; will continue patient's home regimen of atorvastatin.        Chronic combined systolic and diastolic heart failure    Stable without evidence of acute heart failure        Coronary artery disease involving native coronary artery of native heart without angina pectoris    Stable; will continue her home regimen of atorvastatin. Hold antihypertensives due to low BP        Tobacco abuse    Patient was counseled on smoking cessation and she will be provided a nicotine transdermal patch applied while inpatient.         Type 2 diabetes mellitus, controlled    Basal-prandial insulin along with insulin sliding scale.        COPD (chronic obstructive pulmonary disease)    Clinically-stable without wheezing.  Will continue home regimen of ICS/LABA and have as-needed SEAN/LAMA available.        Essential hypertension, benign    Patient's blood pressure was low. Held antihypertensives. Restarted as tolerated.          VTE Risk Mitigation         Ordered     Place GENARO hose  Until discontinued      06/04/18 1649     Place sequential compression device  Until discontinued      06/04/18 1649     IP VTE HIGH RISK PATIENT  Once      06/03/18 2110              Jeanie Pitts MD  Department of Hospital Medicine   [facility]

## 2018-06-07 NOTE — SUBJECTIVE & OBJECTIVE
Interval History: persistent suprapubic pain.     Review of Systems   Constitutional: Negative for activity change, appetite change, chills, diaphoresis, fatigue, fever and unexpected weight change.   HENT: Negative.    Eyes: Negative.    Respiratory: Negative for cough, chest tightness, shortness of breath and wheezing.    Cardiovascular: Negative for chest pain, palpitations and leg swelling.   Gastrointestinal: Negative for abdominal distention, abdominal pain, blood in stool, constipation, diarrhea, nausea and vomiting.   Genitourinary: Negative for dysuria and hematuria.        Suprapubic pain   Musculoskeletal: Negative.    Skin: Negative.    Neurological: Negative for dizziness, seizures, syncope, weakness and light-headedness.   Psychiatric/Behavioral: Negative.      Objective:     Vital Signs (Most Recent):  Temp: 98.7 °F (37.1 °C) (06/07/18 0754)  Pulse: 75 (06/07/18 0853)  Resp: 18 (06/07/18 0853)  BP: 127/61 (06/07/18 0754)  SpO2: (!) 92 % (06/07/18 0853) Vital Signs (24h Range):  Temp:  [98.6 °F (37 °C)-99.3 °F (37.4 °C)] 98.7 °F (37.1 °C)  Pulse:  [70-93] 75  Resp:  [18] 18  SpO2:  [92 %-96 %] 92 %  BP: (125-154)/(61-72) 127/61     Weight: 86.2 kg (190 lb)  Body mass index is 37.11 kg/m².    Intake/Output Summary (Last 24 hours) at 06/07/18 1422  Last data filed at 06/07/18 1200   Gross per 24 hour   Intake              440 ml   Output              550 ml   Net             -110 ml      Physical Exam   Constitutional: She is oriented to person, place, and time. She appears well-developed. No distress.   HENT:   Head: Normocephalic and atraumatic.   Right Ear: External ear normal.   Left Ear: External ear normal.   Nose: Nose normal.   Eyes: Right eye exhibits no discharge. Left eye exhibits no discharge.   Neck: Normal range of motion.   Cardiovascular: Normal rate, regular rhythm, normal heart sounds and intact distal pulses.  Exam reveals no gallop and no friction rub.    No murmur  heard.  Pulmonary/Chest: Effort normal and breath sounds normal. No respiratory distress. She has no wheezes. She has no rales. She exhibits no tenderness.   Abdominal: Soft. Bowel sounds are normal. She exhibits no distension. There is no tenderness. There is no rebound and no guarding.   Genitourinary:   Genitourinary Comments: Mild tenderness to palpation to the supra pubic area. No induration. No fluctuance. No erythema   Musculoskeletal: Normal range of motion. She exhibits no edema.   Neurological: She is alert and oriented to person, place, and time.   Skin: Skin is warm and dry. She is not diaphoretic. No erythema.   Nursing note and vitals reviewed.      Significant Labs: All pertinent labs within the past 24 hours have been reviewed.    Significant Imaging: I have reviewed all pertinent imaging results/findings within the past 24 hours.  I have reviewed and interpreted all pertinent imaging results/findings within the past 24 hours.

## 2018-06-07 NOTE — ASSESSMENT & PLAN NOTE
With reported bright red blood per rectum prior to admission. Hgb 7.9 g/dL on admit which is lower than baseline. No upper GI symptoms. GI consulted. Held heparin and aspirin. Stable for outpatient work up once acute issue resolves.

## 2018-06-07 NOTE — PLAN OF CARE
Problem: Patient Care Overview  Goal: Plan of Care Review  Patient's pain is under better control. Pelvic area less inflamed. No acute distress noted at this time. Will continue to monitor.

## 2018-06-07 NOTE — PT/OT/SLP PROGRESS
Physical Therapy Treatment    Patient Name:  Raissa Land   MRN:  [id number]    Recommendations:     Discharge Recommendations:  home health PT   Discharge Equipment Recommendations: rollator   Barriers to discharge: Decreased caregiver support (lives alone but states her daughter will assist)    Assessment:     Raissa Land is a 67 y.o. female admitted with a medical diagnosis of Cellulitis of pubic region.  She presents with the following impairments/functional limitations:  weakness, impaired functional mobilty, gait instability, impaired balance, pain, decreased ROM, decreased lower extremity function.    Rehab Prognosis:  good; patient would benefit from acute skilled PT services to address these deficits and reach maximum level of function.      Recent Surgery: * No surgery found *      Plan:     During this hospitalization, patient to be seen 5 x/week to address the above listed problems via gait training, therapeutic activities, therapeutic exercises  · Plan of Care Expires:  06/19/18   Plan of Care Reviewed with: patient    Subjective     Communicated with nurse Maldonado prior to session.  Patient found supine in bed upon PT entry to room, agreeable to treatment.      Chief Complaint: Pain.   Patient comments/goals: To get rid of pain.   Pain/Comfort:  · Pain Rating 1: 6/10  · Location - Side 1: Left  · Location 1: hip  · Pain Addressed 1: Distraction, Cessation of Activity, Nurse notified (patient denied need for pain medicine prior to PT session )  · Pain Rating Post-Intervention 1: 6/10    Objective:     Patient found with: peripheral IV     General Precautions: Standard, fall   Orthopedic Precautions:N/A   Braces: N/A     Functional Mobility:  · Bed Mobility:     · Supine to Sit: supervision  · Sit to Supine: supervision  · Transfers:     · Sit to Stand:  supervision with no AD  · Toilet Transfer (bed to bedside commode): supervision with  no AD using Stand Pivot  · Patient performed standing pericare with  supervision after bowel movement.   · Gait:  Patient ambulated 80ft with Rolling Walker and CGA using 3-point gait. Patient demonstrated decreased samina, decreased velocity of limb motion and decreased step length during gait due to impaired balance and pain.      AM-PAC 6 CLICK MOBILITY  Turning over in bed (including adjusting bedclothes, sheets and blankets)?: 4  Sitting down on and standing up from a chair with arms (e.g., wheelchair, bedside commode, etc.): 4  Moving from lying on back to sitting on the side of the bed?: 4  Moving to and from a bed to a chair (including a wheelchair)?: 4  Need to walk in hospital room?: 3  Climbing 3-5 steps with a railing?: 2  Total Score: 21     Patient left supine with all lines intact, call button in reach and nurse Erica notified.    GOALS:    Physical Therapy Goals        Problem: Physical Therapy Goal    Goal Priority Disciplines Outcome Goal Variances Interventions   Physical Therapy Goal     PT/OT, PT Ongoing (interventions implemented as appropriate)     Description:  Goals to be met by: 18    Patient will increase functional independence with mobility by performin. Supine to sit with supervision  2. Sit to stand transfer with Supervision  3. Gait  x 150 feet with Supervision using Rolling Walker  4. Lower extremity exercise program x30 reps with supervision                      Time Tracking:     PT Received On: 18  PT Start Time: 1351     PT Stop Time: 1405  PT Total Time (min): 14 min     Billable Minutes: Gait Training  14    Treatment Type: Treatment  PT/PTA: PT     PTA Visit Number: 0     Annie Zapata, PT  2018

## 2018-06-07 NOTE — PLAN OF CARE
Patient will discharge back home when medically stable. Patient in need of home health/PT with a rollator and bedside commode for DME needs. SW will continue to assist as needed.         06/07/18 9687   Discharge Reassessment   Assessment Type Discharge Planning Reassessment   Provided patient/caregiver education on the expected discharge date and the discharge plan No   Do you have any problems affording any of your prescribed medications? No   Discharge Plan A Home;Home Health   Discharge Plan B Home   Patient choice form signed by patient/caregiver N/A   Can the patient answer the patient profile reliably? Yes, cognitively intact   How does the patient rate their overall health at the present time? Fair   Describe the patient's ability to walk at the present time. Walks with the help of equipment   How often would a person be available to care for the patient? Occasionally

## 2018-06-07 NOTE — PLAN OF CARE
Problem: Patient Care Overview  Goal: Plan of Care Review  Outcome: Ongoing (interventions implemented as appropriate)  Pt free of accidental injury during shift. No s/s of distress noted. Pain better controlled. Tolerating diet well. Antibiotics administered as ordered. VSS. Safety measures maintained. Call bell within reach. Will continue to monitor

## 2018-06-07 NOTE — ASSESSMENT & PLAN NOTE
She met criteria for sepsis given tachycardia, leukocytosis, and cellulitis.  Blood and urine cultures NGTD; IV fluid hydration and antibiotics. Tachycardia improved with IV fluid resuscitation. She has a chronic leukocytosis. BCx NGTD. Not clear if this was truly sepsis of just volume depletion.

## 2018-06-07 NOTE — PLAN OF CARE
Problem: Physical Therapy Goal  Goal: Physical Therapy Goal  Goals to be met by: 18    Patient will increase functional independence with mobility by performin. Supine to sit with supervision  2. Sit to stand transfer with Supervision  3. Gait  x 150 feet with Supervision using Rolling Walker  4. Lower extremity exercise program x30 reps with supervision     Outcome: Ongoing (interventions implemented as appropriate)    Patient increased ambulation distance to 80ft with RW and CGA but continues to be limited by 6/10 pain in left hip.

## 2018-06-08 LAB
ANION GAP SERPL CALC-SCNC: 9 MMOL/L
BACTERIA BLD CULT: NORMAL
BACTERIA BLD CULT: NORMAL
BASOPHILS # BLD AUTO: 0.02 K/UL
BASOPHILS NFR BLD: 0.2 %
BUN SERPL-MCNC: 9 MG/DL
CALCIUM SERPL-MCNC: 8.9 MG/DL
CHLORIDE SERPL-SCNC: 108 MMOL/L
CO2 SERPL-SCNC: 28 MMOL/L
CREAT SERPL-MCNC: 1.1 MG/DL
DIFFERENTIAL METHOD: ABNORMAL
EOSINOPHIL # BLD AUTO: 0.1 K/UL
EOSINOPHIL NFR BLD: 0.8 %
ERYTHROCYTE [DISTWIDTH] IN BLOOD BY AUTOMATED COUNT: 15.3 %
EST. GFR  (AFRICAN AMERICAN): 60 ML/MIN/1.73 M^2
EST. GFR  (NON AFRICAN AMERICAN): 52 ML/MIN/1.73 M^2
GLUCOSE SERPL-MCNC: 216 MG/DL
HCT VFR BLD AUTO: 23.1 %
HGB BLD-MCNC: 7.4 G/DL
LYMPHOCYTES # BLD AUTO: 2.2 K/UL
LYMPHOCYTES NFR BLD: 18.6 %
MCH RBC QN AUTO: 26 PG
MCHC RBC AUTO-ENTMCNC: 32 G/DL
MCV RBC AUTO: 81 FL
MONOCYTES # BLD AUTO: 0.7 K/UL
MONOCYTES NFR BLD: 5.8 %
NEUTROPHILS # BLD AUTO: 8.8 K/UL
NEUTROPHILS NFR BLD: 74.4 %
PLATELET # BLD AUTO: 471 K/UL
PMV BLD AUTO: 9.6 FL
POCT GLUCOSE: 132 MG/DL (ref 70–110)
POCT GLUCOSE: 140 MG/DL (ref 70–110)
POCT GLUCOSE: 154 MG/DL (ref 70–110)
POCT GLUCOSE: 195 MG/DL (ref 70–110)
POTASSIUM SERPL-SCNC: 3.8 MMOL/L
RBC # BLD AUTO: 2.85 M/UL
SODIUM SERPL-SCNC: 145 MMOL/L
WBC # BLD AUTO: 11.8 K/UL

## 2018-06-08 PROCEDURE — 80048 BASIC METABOLIC PNL TOTAL CA: CPT

## 2018-06-08 PROCEDURE — 25000003 PHARM REV CODE 250: Performed by: INTERNAL MEDICINE

## 2018-06-08 PROCEDURE — G8978 MOBILITY CURRENT STATUS: HCPCS | Mod: CI

## 2018-06-08 PROCEDURE — 94761 N-INVAS EAR/PLS OXIMETRY MLT: CPT

## 2018-06-08 PROCEDURE — G8980 MOBILITY D/C STATUS: HCPCS | Mod: CI

## 2018-06-08 PROCEDURE — 97116 GAIT TRAINING THERAPY: CPT

## 2018-06-08 PROCEDURE — S0077 INJECTION, CLINDAMYCIN PHOSP: HCPCS | Performed by: INTERNAL MEDICINE

## 2018-06-08 PROCEDURE — 36415 COLL VENOUS BLD VENIPUNCTURE: CPT

## 2018-06-08 PROCEDURE — 85025 COMPLETE CBC W/AUTO DIFF WBC: CPT

## 2018-06-08 PROCEDURE — G8979 MOBILITY GOAL STATUS: HCPCS | Mod: CI

## 2018-06-08 PROCEDURE — 11000001 HC ACUTE MED/SURG PRIVATE ROOM

## 2018-06-08 RX ADMIN — CLINDAMYCIN IN 5 PERCENT DEXTROSE 600 MG: 12 INJECTION, SOLUTION INTRAVENOUS at 09:06

## 2018-06-08 RX ADMIN — ATORVASTATIN CALCIUM 20 MG: 10 TABLET, FILM COATED ORAL at 09:06

## 2018-06-08 RX ADMIN — SERTRALINE HYDROCHLORIDE 100 MG: 50 TABLET ORAL at 08:06

## 2018-06-08 RX ADMIN — OXYCODONE HYDROCHLORIDE AND ACETAMINOPHEN 1 TABLET: 10; 325 TABLET ORAL at 04:06

## 2018-06-08 RX ADMIN — OXYCODONE HYDROCHLORIDE AND ACETAMINOPHEN 1 TABLET: 10; 325 TABLET ORAL at 08:06

## 2018-06-08 RX ADMIN — CLINDAMYCIN IN 5 PERCENT DEXTROSE 600 MG: 12 INJECTION, SOLUTION INTRAVENOUS at 03:06

## 2018-06-08 RX ADMIN — CARVEDILOL 25 MG: 6.25 TABLET, FILM COATED ORAL at 08:06

## 2018-06-08 RX ADMIN — OXYCODONE HYDROCHLORIDE AND ACETAMINOPHEN 1 TABLET: 10; 325 TABLET ORAL at 03:06

## 2018-06-08 RX ADMIN — INSULIN ASPART 2 UNITS: 100 INJECTION, SOLUTION INTRAVENOUS; SUBCUTANEOUS at 04:06

## 2018-06-08 RX ADMIN — CLINDAMYCIN IN 5 PERCENT DEXTROSE 600 MG: 12 INJECTION, SOLUTION INTRAVENOUS at 04:06

## 2018-06-08 RX ADMIN — CARVEDILOL 25 MG: 6.25 TABLET, FILM COATED ORAL at 09:06

## 2018-06-08 RX ADMIN — INSULIN ASPART 5 UNITS: 100 INJECTION, SOLUTION INTRAVENOUS; SUBCUTANEOUS at 12:06

## 2018-06-08 RX ADMIN — RAMELTEON 8 MG: 8 TABLET, FILM COATED ORAL at 12:06

## 2018-06-08 RX ADMIN — INSULIN ASPART 5 UNITS: 100 INJECTION, SOLUTION INTRAVENOUS; SUBCUTANEOUS at 08:06

## 2018-06-08 RX ADMIN — GABAPENTIN 300 MG: 300 CAPSULE ORAL at 09:06

## 2018-06-08 RX ADMIN — ONDANSETRON 8 MG: 8 TABLET, ORALLY DISINTEGRATING ORAL at 07:06

## 2018-06-08 RX ADMIN — PANTOPRAZOLE SODIUM 40 MG: 40 TABLET, DELAYED RELEASE ORAL at 08:06

## 2018-06-08 RX ADMIN — DOCUSATE SODIUM AND SENNOSIDES 2 TABLET: 8.6; 5 TABLET, FILM COATED ORAL at 08:06

## 2018-06-08 RX ADMIN — INSULIN ASPART 5 UNITS: 100 INJECTION, SOLUTION INTRAVENOUS; SUBCUTANEOUS at 04:06

## 2018-06-08 NOTE — ASSESSMENT & PLAN NOTE
"Patient has been having pubic pain for the past two weeks and a CT-renal study was revealing for "[s]oft tissue induration in the region of the mons...".  She met criteria for severe sepsis and was started empirically on vancomycin and piperacillin/tazobactam.  Her lactic acid was normal. Held antihypertensives for low BP.  Blood and urine cultures NGTD.  There was no evidence of abscess on physical exam or CT. Still complaining of pain 6/6 and abx changed to clindamycin. Still with persistent pain despite being otherwise stable. Repeat CT 6/7/2018 with less swelling and inflammation.  "

## 2018-06-08 NOTE — PLAN OF CARE
Problem: Physical Therapy Goal  Goal: Physical Therapy Goal  Goals to be met by: 18    Patient will increase functional independence with mobility by performin. Supine to sit with supervision  2. Sit to stand transfer with Supervision  3. Gait  x 150 feet with Supervision using Rolling Walker  4. Lower extremity exercise program x30 reps with supervision     Outcome: Ongoing (interventions implemented as appropriate)    Patient ambulated 96ft with RW and supervision. She continues to be limited in distance by pelvic pain.

## 2018-06-08 NOTE — PLAN OF CARE
Problem: Patient Care Overview  Goal: Plan of Care Review  Outcome: Ongoing (interventions implemented as appropriate)  Pt AAOx4. Pt c/o pain. Pain treated with prn meds as ordered. IV ABX continued. Pt remains free from falls. Safety maintained:bed in lowest position with wheels locked; personal items and call light within reach; instructed to call for mobility and SRx2. Pt frequently voids at bedside commode with assistance of staff nearby. No distress noted. Will continue to monitor.   06/08/18 0401   Coping/Psychosocial   Plan Of Care Reviewed With patient

## 2018-06-08 NOTE — SUBJECTIVE & OBJECTIVE
Interval History: persistent suprapubic pain. Progressing with PT/OT.    Review of Systems   Constitutional: Negative for activity change, appetite change, chills, diaphoresis, fatigue, fever and unexpected weight change.   HENT: Negative.    Eyes: Negative.    Respiratory: Negative for cough, chest tightness, shortness of breath and wheezing.    Cardiovascular: Negative for chest pain, palpitations and leg swelling.   Gastrointestinal: Negative for abdominal distention, abdominal pain, blood in stool, constipation, diarrhea, nausea and vomiting.   Genitourinary: Negative for dysuria and hematuria.        Suprapubic pain   Musculoskeletal: Negative.    Skin: Negative.    Neurological: Negative for dizziness, seizures, syncope, weakness and light-headedness.   Psychiatric/Behavioral: Negative.      Objective:     Vital Signs (Most Recent):  Temp: 98.4 °F (36.9 °C) (06/08/18 1145)  Pulse: 70 (06/08/18 1145)  Resp: 16 (06/08/18 1145)  BP: (!) 125/59 (06/08/18 1145)  SpO2: 95 % (06/08/18 1145) Vital Signs (24h Range):  Temp:  [97.8 °F (36.6 °C)-99.1 °F (37.3 °C)] 98.4 °F (36.9 °C)  Pulse:  [69-80] 70  Resp:  [16-20] 16  SpO2:  [93 %-96 %] 95 %  BP: (125-139)/(59-70) 125/59     Weight: 86.2 kg (190 lb)  Body mass index is 37.11 kg/m².    Intake/Output Summary (Last 24 hours) at 06/08/18 1304  Last data filed at 06/08/18 0830   Gross per 24 hour   Intake              510 ml   Output                0 ml   Net              510 ml      Physical Exam   Constitutional: She is oriented to person, place, and time. She appears well-developed. No distress.   HENT:   Head: Normocephalic and atraumatic.   Right Ear: External ear normal.   Left Ear: External ear normal.   Nose: Nose normal.   Eyes: Right eye exhibits no discharge. Left eye exhibits no discharge.   Neck: Normal range of motion.   Cardiovascular: Normal rate, regular rhythm, normal heart sounds and intact distal pulses.  Exam reveals no gallop and no friction rub.     No murmur heard.  Pulmonary/Chest: Effort normal and breath sounds normal. No respiratory distress. She has no wheezes. She has no rales. She exhibits no tenderness.   Abdominal: Soft. Bowel sounds are normal. She exhibits no distension. There is no tenderness. There is no rebound and no guarding.   Genitourinary:   Genitourinary Comments: Subjective tenderness to palpation to the supra pubic area. No induration. No fluctuance. No erythema   Musculoskeletal: Normal range of motion. She exhibits no edema.   Neurological: She is alert and oriented to person, place, and time.   Skin: Skin is warm and dry. She is not diaphoretic. No erythema.   Nursing note and vitals reviewed.      Significant Labs: All pertinent labs within the past 24 hours have been reviewed.    Significant Imaging: I have reviewed all pertinent imaging results/findings within the past 24 hours.  I have reviewed and interpreted all pertinent imaging results/findings within the past 24 hours.

## 2018-06-08 NOTE — PHYSICIAN QUERY
PT Name: Raissa Land  MR #: [id number]     Physician Query Form - Diagnosis Clarification      CDS/: Lisa Peck               Contact information: [email or web address]  This form is a permanent document in the medical record.     Query Date: June 8, 2018    By submitting this query, we are merely seeking further clarification of documentation.  Please utilize your independent clinical judgment when addressing the question(s) below.     The medical record contains the following:      Findings Supporting Clinical Information Location in Medical Record   Not clear if this was truly sepsis of just volume depletion. She met criteria for sepsis given tachycardia, leukocytosis, and cellulitis.  Blood and urine cultures NGTD; IV fluid hydration and antibiotics. Tachycardia improved with IV fluid resuscitation. She has a chronic leukocytosis. BCx NGTD.   Cellulitis of pubic region    WBC= 14.11 > 13.33 > 11.8      Clindamycin IVPB  Zosyn and Vancomycin discontinued.    Temp= 98.0  RR= 18  HR= 87 - 107  BP= 96 - 117/49 - 57    No growth to date Hospital Medicine PN 06/07 & 06/08        Labs, CBC 06/04 - 06/08    MAR        ED VS 06/03          Blood Cultures     Please clarify if the __Sepsis___ diagnosis has been:    [ x  ] Ruled In  [  ] Ruled Out  [  ] Clinically undetermined  [  ] Other/Clarification of findings (please specify)_______________________________    Please document in your progress notes daily for the duration of treatment, until resolved, and include in your discharge summary.

## 2018-06-08 NOTE — PT/OT/SLP PROGRESS
Physical Therapy Treatment    Patient Name:  Raissa Land   MRN:  [id number]    Recommendations:     Discharge Recommendations:  home health PT   Discharge Equipment Recommendations: rollator   Barriers to discharge: limited in ambulation distance due to pain in pelvic region    Assessment:     Raissa Land is a 67 y.o. female admitted with a medical diagnosis of Cellulitis of pubic region.  She presents with the following impairments/functional limitations:  weakness, impaired functional mobilty, gait instability, impaired balance, pain, decreased ROM, decreased lower extremity function. Patient okay to ambulate with nursing staff supervision and rolling walker.     Rehab Prognosis:  good; patient would benefit from acute skilled PT services to address these deficits and reach maximum level of function.      Recent Surgery: * No surgery found *      Plan:     During this hospitalization, patient to be seen 5 x/week to address the above listed problems via gait training, therapeutic activities, therapeutic exercises  · Plan of Care Expires:  06/19/18   Plan of Care Reviewed with: patient    Subjective     Communicated with nurse Jojo prior to session.  Patient found seated on edge of bed upon PT entry to room, agreeable to treatment.      Chief Complaint: Ready to go home. Pain is getting better.   Patient comments/goals: To walk.   Pain/Comfort:  · Pain Rating 1: 7/10  · Location 1:  (pelvis )  · Pain Addressed 1: Pre-medicate for activity, Cessation of Activity, Nurse notified  · Pain Rating Post-Intervention 1: 7/10    Objective:     Patient found with: peripheral IV     General Precautions: Standard, fall   Orthopedic Precautions:N/A   Braces: N/A     Functional Mobility:  · Transfers:     · Sit to Stand:  supervision with rolling walker  · Gait:  96ft with supervision and rolling walker with patient limited in ambulation distance due to pain in pelvic region    AM-PAC 6 CLICK MOBILITY  Turning over in bed  (including adjusting bedclothes, sheets and blankets)?: 4  Sitting down on and standing up from a chair with arms (e.g., wheelchair, bedside commode, etc.): 4  Moving from lying on back to sitting on the side of the bed?: 4  Moving to and from a bed to a chair (including a wheelchair)?: 4  Need to walk in hospital room?: 4  Climbing 3-5 steps with a railing?: 3  Total Score: 23     Patient left seated on edge of bed with all lines intact, call button in reach and nurse Jojo notified and present.    GOALS:    Physical Therapy Goals        Problem: Physical Therapy Goal    Goal Priority Disciplines Outcome Goal Variances Interventions   Physical Therapy Goal     PT/OT, PT Ongoing (interventions implemented as appropriate)     Description:  Goals to be met by: 18    Patient will increase functional independence with mobility by performin. Supine to sit with supervision  2. Sit to stand transfer with Supervision  3. Gait  x 150 feet with Supervision using Rolling Walker  4. Lower extremity exercise program x30 reps with supervision                      Time Tracking:     PT Received On: 18  PT Start Time: 1640     PT Stop Time: 1648  PT Total Time (min): 8 min     Billable Minutes: Gait Training  8     Treatment Type: Treatment  PT/PTA: PT     PTA Visit Number: 0     Annie Zapata, PT  2018

## 2018-06-08 NOTE — PROGRESS NOTES
JENNIFER contacted [facility] @ [phone] to schedule GI follow-up, JENNIFER spoke to Lilliana, patient established with Dr. Vincent. Appointment scheduled for 6/19/18 @ 2:00pm. JENNIFER contacted Dr. Hensley office @ [phone] to schedule PCP follow-up, no one answered phone, just answering service, JENNIFER unable to schedule appointment.

## 2018-06-08 NOTE — PLAN OF CARE
Problem: Patient Care Overview  Goal: Plan of Care Review  Outcome: Ongoing (interventions implemented as appropriate)   06/08/18 1638   Coping/Psychosocial   Plan Of Care Reviewed With patient   Pain management effective. No complain of pain at time. Last cbg 140. Abx via iv continues. Vital signs stable. Assisted to bedside commode as needed. Turns self.Resting quietly at this time

## 2018-06-08 NOTE — PROGRESS NOTES
[facility] Hospital Medicine  Progress Note    Patient Name: Raissa Land  MRN: [id number]  Patient Class: IP- Inpatient   Admission Date: 6/3/2018  Length of Stay: 5 days  Attending Physician: Jeanie Pitts MD  Primary Care Provider: Guerrero Hensley Jr, MD        Subjective:     Principal Problem:Cellulitis of pubic region    HPI:  Ms. Raissa Land is a 67 y.o. female with essential hypertension, hyperlipidemia (.6 Jan 2018), type 2 diabetes mellitus (HbA1c 7.0% Jan 2018), CAD, chronic combined systolic and diastolic heart failure (LVEF 35-40% Jan 2018), COPD, anemia of chronic disease, GERD, tobacco abuse, and depression who presented initially to [facility] ED with complaints of pelvic pain for two weeks.  She reports that it's been making it very difficult for her to walk without a walk due to the pain.  The pain is localized to the suprapubic area but feels a little deeper.  She did not have any recent trauma to the area.  She denies any fevers, chills, nausea, vomiting, abdominal pain, diarrhea, dysuria, nor any hematuria.  She otherwise has been feeling well with a normal appetite.    Hospital Course:  Ms. Land presented with sepsis secondary to suprapubic induration without evidence of abscess on physical exam or CT or any erythema. She was hypotensive and with a lot of pain. She also reported bright red blood per rectum. She does have external hemorrhoids. Blood did not recur while inpatient but Hgb was 7.9 g/dL on presentation. She was started on pip-tazo and vanc for sepsis. Cultures NGTD. GI consulted for blood per rectum and anemia. Her H&H was stable and GI recommended outpatient work up once cellulitis resolved as she does not feel she could tolerate a prep at this time. She continued to complain of pain despite appearing clinically stable. Antibiotics were changed to clindamycin 6/6/2018. CT A/P ordered 6/7 for persistent pain but showed decreased swelling and induration.  Ob/gyn also consulted as patient still complaining of significant pubic pain.    Interval History: persistent suprapubic pain. Progressing with PT/OT.    Review of Systems   Constitutional: Negative for activity change, appetite change, chills, diaphoresis, fatigue, fever and unexpected weight change.   HENT: Negative.    Eyes: Negative.    Respiratory: Negative for cough, chest tightness, shortness of breath and wheezing.    Cardiovascular: Negative for chest pain, palpitations and leg swelling.   Gastrointestinal: Negative for abdominal distention, abdominal pain, blood in stool, constipation, diarrhea, nausea and vomiting.   Genitourinary: Negative for dysuria and hematuria.        Suprapubic pain   Musculoskeletal: Negative.    Skin: Negative.    Neurological: Negative for dizziness, seizures, syncope, weakness and light-headedness.   Psychiatric/Behavioral: Negative.      Objective:     Vital Signs (Most Recent):  Temp: 98.4 °F (36.9 °C) (06/08/18 1145)  Pulse: 70 (06/08/18 1145)  Resp: 16 (06/08/18 1145)  BP: (!) 125/59 (06/08/18 1145)  SpO2: 95 % (06/08/18 1145) Vital Signs (24h Range):  Temp:  [97.8 °F (36.6 °C)-99.1 °F (37.3 °C)] 98.4 °F (36.9 °C)  Pulse:  [69-80] 70  Resp:  [16-20] 16  SpO2:  [93 %-96 %] 95 %  BP: (125-139)/(59-70) 125/59     Weight: 86.2 kg (190 lb)  Body mass index is 37.11 kg/m².    Intake/Output Summary (Last 24 hours) at 06/08/18 1304  Last data filed at 06/08/18 0830   Gross per 24 hour   Intake              510 ml   Output                0 ml   Net              510 ml      Physical Exam   Constitutional: She is oriented to person, place, and time. She appears well-developed. No distress.   HENT:   Head: Normocephalic and atraumatic.   Right Ear: External ear normal.   Left Ear: External ear normal.   Nose: Nose normal.   Eyes: Right eye exhibits no discharge. Left eye exhibits no discharge.   Neck: Normal range of motion.   Cardiovascular: Normal rate, regular rhythm, normal heart  "sounds and intact distal pulses.  Exam reveals no gallop and no friction rub.    No murmur heard.  Pulmonary/Chest: Effort normal and breath sounds normal. No respiratory distress. She has no wheezes. She has no rales. She exhibits no tenderness.   Abdominal: Soft. Bowel sounds are normal. She exhibits no distension. There is no tenderness. There is no rebound and no guarding.   Genitourinary:   Genitourinary Comments: Subjective tenderness to palpation to the supra pubic area. No induration. No fluctuance. No erythema   Musculoskeletal: Normal range of motion. She exhibits no edema.   Neurological: She is alert and oriented to person, place, and time.   Skin: Skin is warm and dry. She is not diaphoretic. No erythema.   Nursing note and vitals reviewed.      Significant Labs: All pertinent labs within the past 24 hours have been reviewed.    Significant Imaging: I have reviewed all pertinent imaging results/findings within the past 24 hours.  I have reviewed and interpreted all pertinent imaging results/findings within the past 24 hours.    Assessment/Plan:      * Cellulitis of pubic region    Patient has been having pubic pain for the past two weeks and a CT-renal study was revealing for "[s]oft tissue induration in the region of the mons...".  She met criteria for severe sepsis and was started empirically on vancomycin and piperacillin/tazobactam.  Her lactic acid was normal. Held antihypertensives for low BP.  Blood and urine cultures NGTD.  There was no evidence of abscess on physical exam or CT. Still complaining of pain 6/6 and abx changed to clindamycin. Still with persistent pain despite being otherwise stable. Repeat CT 6/7/2018 with less swelling and inflammation.        Severe sepsis    She met criteria for sepsis given tachycardia, leukocytosis, and cellulitis.  Blood and urine cultures NGTD; IV fluid hydration and antibiotics. Tachycardia improved with IV fluid resuscitation. She has a chronic " leukocytosis. BCx NGTD. Not clear if this was truly sepsis of just volume depletion.        Anemia associated with acute blood loss    With reported bright red blood per rectum prior to admission. Hgb 7.9 g/dL on admit which is lower than baseline. No upper GI symptoms. GI consulted. Held heparin and aspirin. Stable for outpatient work up once acute issue resolves.        Acute renal failure    Improved with IV fluids.         Depression    Stable; will continue her home regimen of sertraline.        GERD (gastroesophageal reflux disease)    Will continue her home regimen of pantoprazole.        Hyperlipidemia    Poorly-controlled; will continue patient's home regimen of atorvastatin.        Chronic combined systolic and diastolic heart failure    Stable without evidence of acute heart failure        Coronary artery disease involving native coronary artery of native heart without angina pectoris    Stable; will continue her home regimen of atorvastatin. Hold antihypertensives due to low BP        Tobacco abuse    Patient was counseled on smoking cessation and she will be provided a nicotine transdermal patch applied while inpatient.         Type 2 diabetes mellitus, controlled    Basal-prandial insulin along with insulin sliding scale.        COPD (chronic obstructive pulmonary disease)    Clinically-stable without wheezing.  Will continue home regimen of ICS/LABA and have as-needed SEAN/LAMA available.        Essential hypertension, benign    Patient's blood pressure was low. Held antihypertensives. Restarted as tolerated.          VTE Risk Mitigation         Ordered     Place GENARO hose  Until discontinued      06/04/18 1649     Place sequential compression device  Until discontinued      06/04/18 1649     IP VTE HIGH RISK PATIENT  Once      06/03/18 2110              Jeanie Pitts MD  Department of Hospital Medicine   [facility]

## 2018-06-09 VITALS
TEMPERATURE: 99 F | HEIGHT: 60 IN | OXYGEN SATURATION: 94 % | HEART RATE: 70 BPM | RESPIRATION RATE: 18 BRPM | WEIGHT: 190 LBS | BODY MASS INDEX: 37.3 KG/M2 | DIASTOLIC BLOOD PRESSURE: 65 MMHG | SYSTOLIC BLOOD PRESSURE: 143 MMHG

## 2018-06-09 LAB
ANION GAP SERPL CALC-SCNC: 10 MMOL/L
BASOPHILS # BLD AUTO: 0.01 K/UL
BASOPHILS NFR BLD: 0.1 %
BUN SERPL-MCNC: 12 MG/DL
CALCIUM SERPL-MCNC: 8.8 MG/DL
CHLORIDE SERPL-SCNC: 109 MMOL/L
CO2 SERPL-SCNC: 27 MMOL/L
CREAT SERPL-MCNC: 1.1 MG/DL
DIFFERENTIAL METHOD: ABNORMAL
EOSINOPHIL # BLD AUTO: 0.1 K/UL
EOSINOPHIL NFR BLD: 1 %
ERYTHROCYTE [DISTWIDTH] IN BLOOD BY AUTOMATED COUNT: 15.3 %
EST. GFR  (AFRICAN AMERICAN): 60 ML/MIN/1.73 M^2
EST. GFR  (NON AFRICAN AMERICAN): 52 ML/MIN/1.73 M^2
GLUCOSE SERPL-MCNC: 142 MG/DL
HCT VFR BLD AUTO: 22.4 %
HGB BLD-MCNC: 7.3 G/DL
LYMPHOCYTES # BLD AUTO: 2.3 K/UL
LYMPHOCYTES NFR BLD: 20 %
MCH RBC QN AUTO: 26.3 PG
MCHC RBC AUTO-ENTMCNC: 32.6 G/DL
MCV RBC AUTO: 81 FL
MONOCYTES # BLD AUTO: 0.7 K/UL
MONOCYTES NFR BLD: 6 %
NEUTROPHILS # BLD AUTO: 8.2 K/UL
NEUTROPHILS NFR BLD: 72.7 %
PLATELET # BLD AUTO: 464 K/UL
PMV BLD AUTO: 9.5 FL
POCT GLUCOSE: 136 MG/DL (ref 70–110)
POCT GLUCOSE: 193 MG/DL (ref 70–110)
POCT GLUCOSE: 94 MG/DL (ref 70–110)
POTASSIUM SERPL-SCNC: 3.7 MMOL/L
RBC # BLD AUTO: 2.78 M/UL
SODIUM SERPL-SCNC: 146 MMOL/L
WBC # BLD AUTO: 11.25 K/UL

## 2018-06-09 PROCEDURE — S0077 INJECTION, CLINDAMYCIN PHOSP: HCPCS | Performed by: INTERNAL MEDICINE

## 2018-06-09 PROCEDURE — 25000003 PHARM REV CODE 250: Performed by: INTERNAL MEDICINE

## 2018-06-09 PROCEDURE — 80048 BASIC METABOLIC PNL TOTAL CA: CPT

## 2018-06-09 PROCEDURE — 36415 COLL VENOUS BLD VENIPUNCTURE: CPT

## 2018-06-09 PROCEDURE — 85025 COMPLETE CBC W/AUTO DIFF WBC: CPT

## 2018-06-09 RX ORDER — CLINDAMYCIN HYDROCHLORIDE 150 MG/1
450 CAPSULE ORAL EVERY 6 HOURS
Qty: 120 CAPSULE | Refills: 0 | Status: SHIPPED | OUTPATIENT
Start: 2018-06-09 | End: 2018-06-19

## 2018-06-09 RX ORDER — CLINDAMYCIN HYDROCHLORIDE 150 MG/1
450 CAPSULE ORAL EVERY 6 HOURS
Status: DISCONTINUED | OUTPATIENT
Start: 2018-06-09 | End: 2018-06-09 | Stop reason: HOSPADM

## 2018-06-09 RX ADMIN — OXYCODONE HYDROCHLORIDE AND ACETAMINOPHEN 1 TABLET: 10; 325 TABLET ORAL at 07:06

## 2018-06-09 RX ADMIN — RAMELTEON 8 MG: 8 TABLET, FILM COATED ORAL at 12:06

## 2018-06-09 RX ADMIN — CLINDAMYCIN IN 5 PERCENT DEXTROSE 600 MG: 12 INJECTION, SOLUTION INTRAVENOUS at 09:06

## 2018-06-09 RX ADMIN — INSULIN ASPART 5 UNITS: 100 INJECTION, SOLUTION INTRAVENOUS; SUBCUTANEOUS at 12:06

## 2018-06-09 RX ADMIN — PANTOPRAZOLE SODIUM 40 MG: 40 TABLET, DELAYED RELEASE ORAL at 09:06

## 2018-06-09 RX ADMIN — CARVEDILOL 25 MG: 6.25 TABLET, FILM COATED ORAL at 09:06

## 2018-06-09 RX ADMIN — INSULIN ASPART 5 UNITS: 100 INJECTION, SOLUTION INTRAVENOUS; SUBCUTANEOUS at 07:06

## 2018-06-09 RX ADMIN — OXYCODONE HYDROCHLORIDE AND ACETAMINOPHEN 1 TABLET: 10; 325 TABLET ORAL at 12:06

## 2018-06-09 RX ADMIN — CLINDAMYCIN IN 5 PERCENT DEXTROSE 600 MG: 12 INJECTION, SOLUTION INTRAVENOUS at 03:06

## 2018-06-09 RX ADMIN — QUETIAPINE FUMARATE 100 MG: 100 TABLET ORAL at 09:06

## 2018-06-09 NOTE — PROGRESS NOTES
Patient's Charge Nurse informed that patient can discharge once patient's rollator is delivered to her room by [employer].

## 2018-06-09 NOTE — PROGRESS NOTES
Dr. Valencia to bedside in regards to GYN consult; RN was called in to witness physical exam of the pelvis; per MD, the patient was okay to discharge, and the patient was advised by provider that she may follow up with the provider on an outpatient basis. Provider left patient with his contact card.     RN has also contact Dr. Lima to advise of completion of GYN rounds.

## 2018-06-09 NOTE — PROGRESS NOTES
Ismael delivered to unit; Dr. Ge Lima paged due to prescription for oral clindamycin not found in patient's chart nor there being an escript.

## 2018-06-09 NOTE — PROGRESS NOTES
Patient states that prescription was given to her by Dr. Lima. D/c instructions reviewed and given to patient, whom verbalized understanding.

## 2018-06-09 NOTE — PLAN OF CARE
06/09/18 1448   Medicare Message   Important Message from Medicare regarding Discharge Appeal Rights Given to patient/caregiver;Signed/date by patient/caregiver;Explained to patient/caregiver   Date IMM was signed 06/09/18   Time IMM was signed 1824

## 2018-06-09 NOTE — PROGRESS NOTES
WRITTEN HEALTHCARE DISCHARGE INFORMATION     Things that YOU are responsible for to Manage Your Care At Home:  1. Getting your prescriptions filled.  2. Taking you medications as directed. DO NOT MISS ANY DOSES!  3. Going to your follow-up doctor appointments. This is important because it allows the doctor to monitor your progress and to determine if any changes need to be made to your treatment plan.    If you are unable to make your follow up appointments, please call the number listed and reschedule this appointment.     After discharge, if you need assistance, you can call [facility] for 24/7 assistance at [phone]    Thank you for choosing [facility] and allowing us to care for you.   From your care manager: Teresita HOYOS,[place] @ [phone]     You should receive a call from [facility] within 48-72 hours to help manage your care after discharge. Please try to make sure that you answer your phone for this important phone call.     Follow-up Information     Schedule an appointment as soon as possible for a visit with Guerrero Hensley Jr, MD.    Specialty:  Family Medicine  Why:  Outpatient Services, PCP Follow-up Appointment  Contact information:  [address]  [facility] [address]  [phone]             Radha Vincent MD. Go on 6/19/2018.    Specialty:  Gastroenterology  Why:  Outpatient Services, GI Follow-up Appointment, Please arrive to clinic for 2:00PM  Contact information:  [address] [place] [address]  [address]  [facility]  [facility] [address]  [phone]             [facility]    Specialty:  Home Health Services  Contact information:  [address] [facility]  [address]  [facility] [id number] [phone]

## 2018-06-09 NOTE — PROGRESS NOTES
TN contacted [employer] to inquire of request for rollator. [employer] unable to complete request.    Eldon at [employer] contacted to inform of DME request. TN faxed patient's facesheet and order to [phone]. Awaiting callback for receipt of documents.  Eldon will deliver equipment to patient's room at approx 4:00pm.     Eldon contacted TN stating [employer] does not take patient's insurance.     Spoke with Remee with [employer]. Orders faxed to [phone]. Brandon will deliver equipment to patient's room at 4:00pm.

## 2018-06-09 NOTE — PROGRESS NOTES
RN contacted on call provider for Dr.Washington Cazares   in regards to patient evaluation post consult placement on 6/7/18; Dr. Echols was contacted, but RN was advised to contact Dr. Valencia directly. Dr. Cazares was contacted, whereby the provider advised that he would see that patient on today; Provider was advised that primary hospitalist, Dr. Lima plans to d/c on today per Dr. Lima advisement on prior call to RN this AM. Charge RN-Elisabeth updated on POC as well

## 2018-06-09 NOTE — PROGRESS NOTES
TN informed patient that [employer] will deliver the rollator to her room before discharge. TN also informed patient that if records show that her current rollator was issued prior to 5 years, she will have to cover the cost at $150.00. Patient voiced understanding and stated that she received her previous rollator greater than 5 years ago.

## 2018-06-09 NOTE — PLAN OF CARE
Problem: Patient Care Overview  Goal: Plan of Care Review  Outcome: Ongoing (interventions implemented as appropriate)   06/09/18 0321   Coping/Psychosocial   Plan Of Care Reviewed With patient   Pt is AAOx4. Pt remains free from falls. Pt c/o pain. Pain treated with prn med per order.. Ambulates to bedside commode with standby assistance of staff x1. IV ABx continued. IV remains patent.  Safety maintained: bed in lowest position with wheels locked, personal items and call light within reach, SRx2. No distress noted, will continue to monitor.

## 2018-06-09 NOTE — PLAN OF CARE
[facility]      HOME  HEALTH ORDERS     06/09/2018    Admit to Home Health    Diagnoses:  Active Hospital Problems    Diagnosis  POA    *Cellulitis of pubic region [L03.319]  Yes     Priority: 1 - High    Severe sepsis [A41.9, R65.20]  Yes     Priority: 2     Anemia associated with acute blood loss [D62]  Yes     Priority: 3     Acute renal failure [N17.9]  Yes     Priority: 4     Hyperlipidemia [E78.5]  Yes     Chronic    GERD (gastroesophageal reflux disease) [K21.9]  Yes     Chronic    Depression [F32.9]  Yes     Chronic    Coronary artery disease involving native coronary artery of native heart without angina pectoris [I25.10]  Yes     Chronic    Chronic combined systolic and diastolic heart failure [I50.42]  Yes     Chronic    Tobacco abuse [Z72.0]  Yes     Chronic    Type 2 diabetes mellitus, controlled [E11.9]  Yes     Chronic    Essential hypertension, benign [I10]  Yes     Chronic    COPD (chronic obstructive pulmonary disease) [J44.9]  Yes     Chronic      Resolved Hospital Problems    Diagnosis Date Resolved POA   No resolved problems to display.       Patient is homebound due to:  Cellulitis of pubic region    Allergies:Review of patient's allergies indicates:  No Known Allergies    Diet: 1800 kenny ADA, cardiac diet    Acitivities: As tolerated    Nursing:   SN to complete comprehensive assessment including routine vital signs. Instruct on disease process and s/s of complications to report to MD. Review/verify medication list sent home with the patient at time of discharge  and instruct patient/caregiver as needed. Frequency may be adjusted depending on start of care date.    Notify MD if SBP > 160 or < 90; DBP > 90 or < 50; HR > 120 or < 50; Temp > 101    CONSULTS:    PT to evaluate and treat. Evaluate for home safety and equipment needs; Establish/upgrade home exercise program. Perform / instruct on therapeutic exercises, gait training, transfer training, and Range of  Motion.    OT to evaluate and treat. Evaluate home environment for safety and equipment needs. Perform/Instruct on transfers, ADL training, ROM, and therapeutic exercises.     MSW to evaluate for community resources/long-range planning.     Aide to provide assistance with personal care, ADLs, and vital signs        DIABETES CARE:     SN to monitor Diabetic management with blood glucose monitoring QAC/QHS.  Report CBG < 60 or > 350 to physician.      Medications: Review discharge medications with patient and family and provide education.       EmeryRaissa   Home Medication Instructions RANJITH:[id number]    Printed on:06/09/18 1317   Medication Information                      ADVAIR DISKUS 250-50 mcg/dose diskus inhaler               aspirin (ECOTRIN) 81 MG EC tablet  Take 1 tablet (81 mg total) by mouth once daily.             atorvastatin (LIPITOR) 20 MG tablet  Take 1 tablet (20 mg total) by mouth every evening.             carvedilol (COREG) 25 MG tablet  Take 1 tablet (25 mg total) by mouth 2 (two) times daily.             clindamycin (CLEOCIN) 150 MG capsule  Take 3 capsules (450 mg total) by mouth every 6 (six) hours. LAST DOSE 6/19/2018             diclofenac sodium (VOLTAREN) 1 % Gel  Apply 2 g topically 4 (four) times daily.             furosemide (LASIX) 20 MG tablet  Take 1 tablet (20 mg total) by mouth once daily.             gabapentin (NEURONTIN) 600 MG tablet               LEVEMIR FLEXPEN 100 unit/mL (3 mL) InPn               NOVOLOG FLEXPEN 100 unit/mL InPn               oxyCODONE (OXYCONTIN) 30 mg TR12 12 hr tablet  Take 30 mg by mouth every 12 (twelve) hours.             oxyCODONE-acetaminophen (PERCOCET)  mg per tablet  Take 1 tablet by mouth every 4 (four) hours as needed for Pain.             pantoprazole (PROTONIX) 40 MG tablet  Take 1 tablet (40 mg total) by mouth once daily.             potassium chloride (MICRO-K) 10 MEQ CpSR  Take 10 mEq by mouth once daily.               quetiapine  "(SEROQUEL) 100 MG Tab  Take by mouth every evening.             sertraline (ZOLOFT) 100 MG tablet               SURE COMFORT PEN NEEDLE 31 x 3/16 " Ndle               trazodone (DESYREL) 100 MG tablet  Take 100 mg by mouth every evening.                       _________________________________  Jeanie Pitts MD  06/09/2018      "

## 2018-06-09 NOTE — PLAN OF CARE
"TN reviewed follow up appointment information as well as "Cellulits discharge instructions" handout with patient using teach back. Patient stated she will notify the doctor if she a fever and drainage from wound site.  Patient is in agreement and verbalized an understanding. Placed discharge information in blue discharge folder.  TN also reviewed patient responsibility checklist with her using teach back. Patient was able to verbalize her responsibilities after discharge to manage her care at home bein. Going to follow up appointments   2. Picking up rx from the pharmacy when discharged  3. Taking her medications as prescribed     Patient informed that a nurse from [facility] will contact her to schedule her home visits. Patient voiced understanding.     18 1451   Final Note   Assessment Type Final Discharge Note   Discharge Disposition Home-Health   What phone number can be called within the next 1-3 days to see how you are doing after discharge? ([phone])   Hospital Follow Up  Appt(s) scheduled? Yes   Discharge plans and expectations educations in teach back method with documentation complete? Yes   Right Care Referral Info   Post Acute Recommendation Home-care      "

## 2018-06-10 PROBLEM — D62 ANEMIA ASSOCIATED WITH ACUTE BLOOD LOSS: Status: RESOLVED | Noted: 2018-06-03 | Resolved: 2018-06-10

## 2018-06-10 PROBLEM — A41.9 SEVERE SEPSIS: Status: RESOLVED | Noted: 2018-06-03 | Resolved: 2018-06-10

## 2018-06-10 PROBLEM — R65.20 SEVERE SEPSIS: Status: RESOLVED | Noted: 2018-06-03 | Resolved: 2018-06-10

## 2018-06-10 PROBLEM — N17.9 ACUTE RENAL FAILURE: Status: RESOLVED | Noted: 2018-06-03 | Resolved: 2018-06-10

## 2018-06-10 NOTE — CONSULTS
"[facility]  Obstetrics & Gynecology  Consult Note    Patient Name: Raissa Land  MRN: [id number]  Admission Date: 6/3/2018  Hospital Length of Stay: 6 days  Code Status: Prior  Primary Care Provider: Guerrero Hensley Jr, MD  Principal Problem: Cellulitis of pubic region    Consults  Subjective:     Chief Complaint: cellulitis in mons pubis    History of Present Illness: Pain started about 2 weeks ago and had been getting progressively worse. She is status post vaginal hysterectomy  over 30 years ago . She is still currently sexually active but does not ascribe this pain to anything to do with her sex life. She was  not aware that she had a fever until she came to the hospital. She reports that her pain symptoms have pretty much resolved since she has been here on antibiotics and she is ready to be discharged home so she can get some sleep.    No current facility-administered medications on file prior to encounter.      Current Outpatient Prescriptions on File Prior to Encounter   Medication Sig    ADVAIR DISKUS 250-50 mcg/dose diskus inhaler     atorvastatin (LIPITOR) 20 MG tablet Take 1 tablet (20 mg total) by mouth every evening.    gabapentin (NEURONTIN) 600 MG tablet     LEVEMIR FLEXPEN 100 unit/mL (3 mL) InPn     NOVOLOG FLEXPEN 100 unit/mL InPn     potassium chloride (MICRO-K) 10 MEQ CpSR Take 10 mEq by mouth once daily.      quetiapine (SEROQUEL) 100 MG Tab Take by mouth every evening.    SURE COMFORT PEN NEEDLE 31 x 3/16 " Ndle     trazodone (DESYREL) 100 MG tablet Take 100 mg by mouth every evening.    [DISCONTINUED] lisinopril 10 MG tablet Take 4 tablets (40 mg total) by mouth once daily.    aspirin (ECOTRIN) 81 MG EC tablet Take 1 tablet (81 mg total) by mouth once daily.    carvedilol (COREG) 25 MG tablet Take 1 tablet (25 mg total) by mouth 2 (two) times daily.    diclofenac sodium (VOLTAREN) 1 % Gel Apply 2 g topically 4 (four) times daily.    furosemide (LASIX) 20 MG tablet " Take 1 tablet (20 mg total) by mouth once daily.    pantoprazole (PROTONIX) 40 MG tablet Take 1 tablet (40 mg total) by mouth once daily.    sertraline (ZOLOFT) 100 MG tablet        Review of patient's allergies indicates:  No Known Allergies    Past Medical History:   Diagnosis Date    Anxiety     Arthritis     Asthma     Bronchiectasis with acute exacerbation     Bronchitis     Chronic pain     right knee    Coronary artery disease involving native coronary artery of native heart without angina pectoris 1/10/2018    Essential hypertension, benign 2012    Hypotension, iatrogenic     Insomnia     Knee pain, right     chronic    Mitral valve prolapse     Obstructive chronic bronchitis without exacerbation 2012    Type II or unspecified type diabetes mellitus without mention of complication, uncontrolled     Type II or unspecified type diabetes mellitus without mention of complication, uncontrolled      Obstetric History       T4      L0     SAB0   TAB0   Ectopic0   Multiple0   Live Births0       # Outcome Date GA Lbr Germán/2nd Weight Sex Delivery Anes PTL Lv   4 Term            3 Term            2 Term            1 Term                 Past Surgical History:   Procedure Laterality Date    bowel reconstruction      BREAST SURGERY      CHOLECYSTECTOMY      HYSTERECTOMY      KNEE SURGERY      R, surgeries x 7    Right Leg reconstruction surgery      Rotator Cuff Surgery      TONSILLECTOMY      WRIST SURGERY       Family History     Problem Relation (Age of Onset)    Heart disease Sister, Brother    Hypertension Mother        Social History Main Topics    Smoking status: Current Every Day Smoker     Packs/day: 0.25     Years: 45.00     Types: Cigarettes    Smokeless tobacco: Never Used    Alcohol use No    Drug use: Yes     Types: Marijuana    Sexual activity: Not Currently     Partners: Male     Birth control/ protection: None     Review of Systems  Objective:      Vital Signs (Most Recent):  Temp: 98.7 °F (37.1 °C) (06/09/18 1554)  Pulse: 70 (06/09/18 1554)  Resp: 18 (06/09/18 1132)  BP: (!) 143/65 (06/09/18 1554)  SpO2: (!) 94 % (06/09/18 1554) Vital Signs (24h Range):  Temp:  [98 °F (36.7 °C)-98.7 °F (37.1 °C)] 98.7 °F (37.1 °C)  Pulse:  [65-74] 70  Resp:  [18-20] 18  SpO2:  [92 %-97 %] 94 %  BP: (114-143)/(55-65) 143/65     Weight: 86.2 kg (190 lb)  Body mass index is 37.11 kg/m².  No LMP recorded. Patient is postmenopausal.    Physical Exam    Laboratory:  CBC:   Recent Labs  Lab 06/09/18  0408   WBC 11.25   RBC 2.78*   HGB 7.3*   HCT 22.4*   *   MCV 81*   MCH 26.3*   MCHC 32.6     CMP:   Recent Labs  Lab 06/09/18  0408   *   CALCIUM 8.8   *   K 3.7   CO2 27      BUN 12   CREATININE 1.1     No results for input(s): COLORU, CLARITYU, SPECGRAV, PHUR, PROTEINUA, GLUCOSEU, BILIRUBINCON, BLOODU, WBCU, RBCU, BACTERIA, MUCUS, NITRITE, LEUKOCYTESUR, UROBILINOGEN, HYALINECASTS in the last 48 hours.  I have personallly reviewed all pertinent lab results from the last 24 hours.  Pelvic examination did not provide any new information, she does not have any suprapubic complaints currently.    Diagnostic Results:  Labs: Reviewed  Notes reviewed    Assessment/Plan:     Active Diagnoses:    Diagnosis Date Noted POA    PRINCIPAL PROBLEM:  Cellulitis of pubic region [L03.319] 06/03/2018 Yes    Severe sepsis [A41.9, R65.20] 06/03/2018 Yes    Hyperlipidemia [E78.5] 06/03/2018 Yes     Chronic    Anemia associated with acute blood loss [D62] 06/03/2018 Yes    GERD (gastroesophageal reflux disease) [K21.9] 06/03/2018 Yes     Chronic    Depression [F32.9] 06/03/2018 Yes     Chronic    Acute renal failure [N17.9] 06/03/2018 Yes    Coronary artery disease involving native coronary artery of native heart without angina pectoris [I25.10] 01/10/2018 Yes     Chronic    Chronic combined systolic and diastolic heart failure [I50.42] 01/10/2018 Yes     Chronic     Tobacco abuse [Z72.0] 01/09/2018 Yes     Chronic    Type 2 diabetes mellitus, controlled [E11.9]  Yes     Chronic    Essential hypertension, benign [I10] 11/21/2012 Yes     Chronic    COPD (chronic obstructive pulmonary disease) [J44.9] 11/21/2012 Yes     Chronic      Problems Resolved During this Admission:    Diagnosis Date Noted Date Resolved POA       Pelvic cellulitis appears to have resolved since admission but we have given her our card and suggested she make an office appointment in about 2 weeks after she has completed her antibotics.    Thank you for your consult. I will follow-up with patient. Please contact us if you have any additional questions.    Tremaine Valencia MD  Obstetrics & Gynecology  [facility]

## 2018-06-11 ENCOUNTER — TELEPHONE (OUTPATIENT)
Dept: ADMINISTRATIVE | Facility: CLINIC | Age: 68
End: 2018-06-11

## 2018-06-11 NOTE — PATIENT INSTRUCTIONS
Patient name: BROOK BIANCHI  : 1950  MRN: [id number]  Ordering MD: KIMBERLY REYES md and patricia canchola md  Clinician name: SURINDER QUIÑONES RN <br>  Agency: [facility]: Phone [phone], Fax [phone]<br>  Reason for call: patient request to be admitted on 18  Level of urgency: high   Contact info: [phone] [email or web address] <br>  Whom to call back: clinician. And or teamlead [facility]: Phone [phone], Fax [phone]<br>

## 2018-06-11 NOTE — PT/OT/SLP DISCHARGE
Physical Therapy Discharge Summary    Name: Raissa Land  MRN: [id number]   Principal Problem: Cellulitis of pubic region     Patient Discharged from acute Physical Therapy on 18.  Please refer to prior PT noted date on 18 for functional status.     Assessment:     Goals partially met. Patient appropriate for care in another setting.    Objective:     GOALS:    Physical Therapy Goals        Problem: Physical Therapy Goal    Goal Priority Disciplines Outcome Goal Variances Interventions   Physical Therapy Goal     PT/OT, PT Unable to achieve outcome(s) by discharge     Description:  Goals to be met by: 18    Patient will increase functional independence with mobility by performin. Supine to sit with supervision  2. Sit to stand transfer with Supervision  3. Gait  x 150 feet with Supervision using Rolling Walker  4. Lower extremity exercise program x30 reps with supervision                      Reasons for Discontinuation of Therapy Services  Transfer to alternate level of care.      Plan:     Patient Discharged to: Home no PT services needed.    Annie Zapata, PT  2018

## 2018-06-11 NOTE — DISCHARGE SUMMARY
[facility] Hospital Medicine  Discharge Summary      Patient Name: Raissa Land  MRN: [id number]  Admission Date: 6/3/2018  Hospital Length of Stay: 6 days  Discharge Date and Time: 6/9/2018  4:54 PM  Attending Physician: Sherice att. providers found   Discharging Provider: Jeanie Pitts MD  Primary Care Provider: Guerrero Hensley Jr, MD      HPI:   Ms. Raissa Land is a 67 y.o. female with essential hypertension, hyperlipidemia (.6 Jan 2018), type 2 diabetes mellitus (HbA1c 7.0% Jan 2018), CAD, chronic combined systolic and diastolic heart failure (LVEF 35-40% Jan 2018), COPD, anemia of chronic disease, GERD, tobacco abuse, and depression who presented initially to [facility] ED with complaints of pelvic pain for two weeks.  She reports that it's been making it very difficult for her to walk without a walk due to the pain.  The pain is localized to the suprapubic area but feels a little deeper.  She did not have any recent trauma to the area.  She denies any fevers, chills, nausea, vomiting, abdominal pain, diarrhea, dysuria, nor any hematuria.  She otherwise has been feeling well with a normal appetite.    Hospital Course:   Ms. Land presented with sepsis secondary to suprapubic induration without evidence of abscess on physical exam or CT or any erythema. She was hypotensive and with a lot of pain. She also reported bright red blood per rectum. She does have external hemorrhoids. Blood did not recur while inpatient but Hgb was 7.9 g/dL on presentation. She was started on pip-tazo and vanc for sepsis. Cultures NGTD. GI consulted for blood per rectum and anemia. Her H&H was stable and GI recommended outpatient work up once cellulitis resolved as she does not feel she could tolerate a prep at this time. She continued to complain of pain despite appearing clinically stable. Antibiotics were changed to clindamycin 6/6/2018. CT A/P ordered 6/7 for persistent pain but showed decreased swelling and  induration. Ob/gyn also consulted as patient still complaining of significant pubic pain. Pelvic exam by Ob/Gyn was unremarkable. They felt there was nothing to add to her current course of treatment but she was given their card in the event symptoms returned. She was stable for discharge home with home health to complete a course of antibiotics with clindamycin PO.     Consults:   Consults         Status Ordering Provider     Inpatient consult to Gastroenterology  Once     Provider:  Philippe Lara MD    Completed CHRISTEN MERINO        Final Active Diagnoses:    Diagnosis Date Noted POA    PRINCIPAL PROBLEM:  Cellulitis of pubic region [L03.319] 06/03/2018 Yes    Hyperlipidemia [E78.5] 06/03/2018 Yes     Chronic    GERD (gastroesophageal reflux disease) [K21.9] 06/03/2018 Yes     Chronic    Depression [F32.9] 06/03/2018 Yes     Chronic    Coronary artery disease involving native coronary artery of native heart without angina pectoris [I25.10] 01/10/2018 Yes     Chronic    Chronic combined systolic and diastolic heart failure [I50.42] 01/10/2018 Yes     Chronic    Tobacco abuse [Z72.0] 01/09/2018 Yes     Chronic    Type 2 diabetes mellitus, controlled [E11.9]  Yes     Chronic    Essential hypertension, benign [I10] 11/21/2012 Yes     Chronic    COPD (chronic obstructive pulmonary disease) [J44.9] 11/21/2012 Yes     Chronic      Problems Resolved During this Admission:    Diagnosis Date Noted Date Resolved POA    Severe sepsis [A41.9, R65.20] 06/03/2018 06/10/2018 Yes    Anemia associated with acute blood loss [D62] 06/03/2018 06/10/2018 Yes    Acute renal failure [N17.9] 06/03/2018 06/10/2018 Yes       Discharged Condition: stable    Disposition: Home-Health Care c    Follow Up:  Follow-up Information     Schedule an appointment as soon as possible for a visit with Guerrero Hensley Jr, MD.    Specialty:  Family Medicine  Why:  Outpatient Services, PCP Follow-up Appointment  Contact  information:  [address]  [address]  [facility] [address]  [phone]             Radha Vincent MD. Go on 6/19/2018.    Specialty:  Gastroenterology  Why:  Outpatient Services, GI Follow-up Appointment, Please arrive to clinic for 2:00PM  Contact information:  [address] [place] [address]  [address]  [facility]  [place] [id number] [phone]             [facility] [place]    Specialty:  Home Health Services  Contact information:  [address]  [address]  [place] [address]  [phone]                 Patient Instructions:     WALKER FOR HOME USE   Order Specific Question Answer Comments   Type of Walker: Rollator    With wheels? Yes    Height: 5' (1.524 m)    Weight: 86.2 kg (190 lb)    Length of need (1-99 months): 99    Does patient have medical equipment at home? rollator    Does patient have medical equipment at home? shower chair    Please check all that apply: Patient's condition impairs ambulation.    Please check all that apply: Patient is unable to safely ambulate without equipment.      Diet Cardiac     Diet diabetic     Activity as tolerated     Notify your health care provider if you experience any of the following:  increased confusion or weakness     Notify your health care provider if you experience any of the following:  persistent dizziness, light-headedness, or visual disturbances     Notify your health care provider if you experience any of the following:  worsening rash     Notify your health care provider if you experience any of the following:  severe persistent headache     Notify your health care provider if you experience any of the following:  difficulty breathing or increased cough     Notify your health care provider if you experience any of the following:  redness, tenderness, or signs of infection (pain, swelling, redness, odor or green/yellow discharge around incision site)     Notify your  health care provider if you experience any of the following:  severe uncontrolled pain     Notify your health care provider if you experience any of the following:  persistent nausea and vomiting or diarrhea     Notify your health care provider if you experience any of the following:  temperature >100.4       Medications:  Reconciled Home Medications:      Medication List      START taking these medications    clindamycin 150 MG capsule  Commonly known as:  CLEOCIN  Take 3 capsules (450 mg total) by mouth every 6 (six) hours.        CONTINUE taking these medications    ADVAIR DISKUS 250-50 mcg/dose diskus inhaler  Generic drug:  fluticasone-salmeterol 250-50 mcg/dose     aspirin 81 MG EC tablet  Commonly known as:  ECOTRIN  Take 1 tablet (81 mg total) by mouth once daily.     atorvastatin 20 MG tablet  Commonly known as:  LIPITOR  Take 1 tablet (20 mg total) by mouth every evening.     carvedilol 25 MG tablet  Commonly known as:  COREG  Take 1 tablet (25 mg total) by mouth 2 (two) times daily.     diclofenac sodium 1 % Gel  Commonly known as:  VOLTAREN  Apply 2 g topically 4 (four) times daily.     furosemide 20 MG tablet  Commonly known as:  LASIX  Take 1 tablet (20 mg total) by mouth once daily.     gabapentin 600 MG tablet  Commonly known as:  NEURONTIN     LEVEMIR FLEXPEN 100 unit/mL (3 mL) Inpn pen  Generic drug:  insulin detemir U-100     NovoLOG Flexpen U-100 Insulin 100 unit/mL Inpn pen  Generic drug:  insulin aspart U-100     oxyCODONE 30 mg Tr12 12 hr tablet  Commonly known as:  OXYCONTIN  Take 30 mg by mouth every 12 (twelve) hours.     oxyCODONE-acetaminophen  mg per tablet  Commonly known as:  PERCOCET  Take 1 tablet by mouth every 4 (four) hours as needed for Pain.     pantoprazole 40 MG tablet  Commonly known as:  PROTONIX  Take 1 tablet (40 mg total) by mouth once daily.     potassium chloride 10 MEQ Cpsr  Commonly known as:  MICRO-K  Take 10 mEq by mouth once daily.     QUEtiapine 100 MG  "Tab  Commonly known as:  SEROQUEL  Take by mouth every evening.     sertraline 100 MG tablet  Commonly known as:  ZOLOFT     SURE COMFORT PEN NEEDLE 31 gauge x 3/16" Ndle  Generic drug:  pen needle, diabetic     traZODone 100 MG tablet  Commonly known as:  DESYREL  Take 100 mg by mouth every evening.        STOP taking these medications    carisoprodol 350 MG tablet  Commonly known as:  SOMA     lisinopril 10 MG tablet            Time spent on the discharge of patient: 45 minutes  Patient was seen and examined on the date of discharge and determined to be suitable for discharge.         Jeanie Pitts MD  Department of Hospital Medicine  [facility]  "

## 2018-06-12 ENCOUNTER — TELEPHONE (OUTPATIENT)
Dept: ADMINISTRATIVE | Facility: CLINIC | Age: 68
End: 2018-06-12

## 2018-06-12 NOTE — PATIENT INSTRUCTIONS
Patient name: BROOK BIANCHI  : 1950  MRN: [id number]  Ordering MD: KIMBERLY REYES md and patricia canchola md  Clinician name: SURINDER QIUÑONES RN <br>  Agency: [facility]: Phone [phone], Fax [phone]<br>  Reason for call: patient request to be admitted on 18  Level of urgency: high     Contact info: [phone] [email or web address] <br>  Whom to call back: clinician. And or teamlead [facility]: Phone [phone], Fax [phone]<br>

## 2018-06-20 ENCOUNTER — TELEPHONE (OUTPATIENT)
Dept: ADMINISTRATIVE | Facility: CLINIC | Age: 68
End: 2018-06-20

## 2018-06-27 DIAGNOSIS — R10.2 PELVIC PAIN: ICD-10-CM

## 2018-06-27 DIAGNOSIS — N94.89 OTHER SPECIFIED CONDITIONS ASSOCIATED WITH FEMALE GENITAL ORGANS AND MENSTRUAL CYCLE: Primary | ICD-10-CM

## 2018-06-27 DIAGNOSIS — R10.2 PELVIC PAIN IN FEMALE: ICD-10-CM

## 2018-07-27 ENCOUNTER — HOSPITAL ENCOUNTER (OUTPATIENT)
Dept: RADIOLOGY | Facility: HOSPITAL | Age: 68
Discharge: HOME | End: 2018-07-27
Attending: OBSTETRICS & GYNECOLOGY
Payer: MEDICARE

## 2018-07-27 DIAGNOSIS — R10.2 PELVIC PAIN IN FEMALE: ICD-10-CM

## 2018-07-27 DIAGNOSIS — N94.89 OTHER SPECIFIED CONDITIONS ASSOCIATED WITH FEMALE GENITAL ORGANS AND MENSTRUAL CYCLE: ICD-10-CM

## 2018-07-27 DIAGNOSIS — R10.2 PELVIC PAIN: ICD-10-CM

## 2018-07-27 PROCEDURE — 76856 US EXAM PELVIC COMPLETE: CPT | Mod: TC

## 2018-07-27 PROCEDURE — 76830 TRANSVAGINAL US NON-OB: CPT | Mod: 26,,, | Performed by: RADIOLOGY

## 2018-07-27 PROCEDURE — 76856 US EXAM PELVIC COMPLETE: CPT | Mod: 26,,, | Performed by: RADIOLOGY

## 2018-07-27 PROCEDURE — 76830 TRANSVAGINAL US NON-OB: CPT | Mod: TC

## 2019-02-05 VITALS
OXYGEN SATURATION: 97 % | DIASTOLIC BLOOD PRESSURE: 91 MMHG | HEART RATE: 97 BPM | HEIGHT: 60 IN | SYSTOLIC BLOOD PRESSURE: 139 MMHG | BODY MASS INDEX: 38.87 KG/M2 | WEIGHT: 198 LBS | TEMPERATURE: 99 F | RESPIRATION RATE: 18 BRPM

## 2019-02-05 PROCEDURE — 99284 EMERGENCY DEPT VISIT MOD MDM: CPT | Mod: ER

## 2019-02-06 ENCOUNTER — HOSPITAL ENCOUNTER (EMERGENCY)
Facility: HOSPITAL | Age: 69
Discharge: HOME | End: 2019-02-06
Attending: EMERGENCY MEDICINE
Payer: COMMERCIAL
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
## 2019-02-06 DIAGNOSIS — L08.9 CAT BITE OF RIGHT LOWER LEG WITH INFECTION, INITIAL ENCOUNTER: ICD-10-CM

## 2019-02-06 DIAGNOSIS — S91.351A: ICD-10-CM

## 2019-02-06 DIAGNOSIS — S81.851A CAT BITE OF RIGHT LOWER LEG WITH INFECTION, INITIAL ENCOUNTER: ICD-10-CM
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
## 2019-02-06 DIAGNOSIS — L08.9: ICD-10-CM

## 2019-02-06 DIAGNOSIS — T07.XXXA MULTIPLE ABRASIONS: Primary | ICD-10-CM

## 2019-02-06 PROCEDURE — 90471 IMMUNIZATION ADMIN: CPT | Mod: ER | Performed by: EMERGENCY MEDICINE

## 2019-02-06 PROCEDURE — 90714 TD VACC NO PRESV 7 YRS+ IM: CPT | Mod: ER | Performed by: EMERGENCY MEDICINE

## 2019-02-06 PROCEDURE — 25000003 PHARM REV CODE 250: Mod: ER | Performed by: EMERGENCY MEDICINE

## 2019-02-06 PROCEDURE — 63600175 PHARM REV CODE 636 W HCPCS: Mod: ER | Performed by: EMERGENCY MEDICINE

## 2019-02-06 RX ORDER — AMOXICILLIN AND CLAVULANATE POTASSIUM 875; 125 MG/1; MG/1
1 TABLET, FILM COATED ORAL 2 TIMES DAILY
Qty: 14 TABLET | Refills: 0 | Status: SHIPPED | OUTPATIENT
Start: 2019-02-06 | End: 2019-02-16

## 2019-02-06 RX ORDER — MUPIROCIN 20 MG/G
OINTMENT TOPICAL 2 TIMES DAILY
Qty: 22 G | Refills: 0 | Status: SHIPPED | OUTPATIENT
Start: 2019-02-06 | End: 2019-02-16

## 2019-02-06 RX ADMIN — BACITRACIN, NEOMYCIN, POLYMYXIN B 1 EACH: 400; 3.5; 5 OINTMENT TOPICAL at 02:02

## 2019-02-06 RX ADMIN — CLOSTRIDIUM TETANI TOXOID ANTIGEN (FORMALDEHYDE INACTIVATED) AND CORYNEBACTERIUM DIPHTHERIAE TOXOID ANTIGEN (FORMALDEHYDE INACTIVATED) 0.5 ML: 5; 2 INJECTION, SUSPENSION INTRAMUSCULAR at 02:02

## 2019-02-06 NOTE — ED PROVIDER NOTES
Encounter Date: 2/5/2019    SCRIBE #1 NOTE: I, Ar Gray, am scribing for, and in the presence of,  Dr. Sinclair. I have scribed the following portions of the note - Other sections scribed: HPI, ROS, PE.       History     Chief Complaint   Patient presents with    cat bite to foot and leg with pain     pt reports was bite by neighberlina cat last pm to fight foot and right leg, denies drainage, unknown shot status of cat, redness and warmth noted , denies fever at home     The history is provided by the patient.   Animal Bite    The incident occurred yesterday. The incident occurred in the street. She came to the ER via by private vehicle. There is an injury to the right lower leg and right foot. There have been no prior injuries to these areas. Her tetanus status is out of date.     Review of patient's allergies indicates:  No Known Allergies  Past Medical History:   Diagnosis Date    Anxiety     Arthritis     Asthma     Bronchiectasis with acute exacerbation     Bronchitis     Chronic pain     right knee    Coronary artery disease involving native coronary artery of native heart without angina pectoris 1/10/2018    Essential hypertension, benign 11/21/2012    Hypotension, iatrogenic     Insomnia     Knee pain, right     chronic    Mitral valve prolapse     Obstructive chronic bronchitis without exacerbation 11/21/2012    Type II or unspecified type diabetes mellitus without mention of complication, uncontrolled     Type II or unspecified type diabetes mellitus without mention of complication, uncontrolled      Past Surgical History:   Procedure Laterality Date    bowel reconstruction      BREAST SURGERY      CHOLECYSTECTOMY      ESOPHAGOGASTRODUODENOSCOPY (EGD) Left 1/10/2018    Performed by Sammi Thornton [facility] at [facility]    HYSTERECTOMY      KNEE SURGERY      R, surgeries x 7    Right Leg reconstruction surgery      Rotator Cuff Surgery      TONSILLECTOMY      WRIST SURGERY        Family History   Problem Relation Age of Onset    Heart disease Sister     Heart disease Brother     Hypertension Mother      Social History     Tobacco Use    Smoking status: Current Every Day Smoker     Packs/day: 0.25     Years: 45.00     Pack years: 11.25     Types: Cigarettes    Smokeless tobacco: Never Used   Substance Use Topics    Alcohol use: No    Drug use: Yes     Types: Marijuana     Review of Systems   Constitutional: Negative for chills and fever.   Respiratory: Negative for shortness of breath.    Skin: Positive for wound (3x Abrasions lower right leg).   All other systems reviewed and are negative.      Physical Exam     Initial Vitals [02/05/19 2309]   BP Pulse Resp Temp SpO2   (!) 139/91 97 18 98.9 °F (37.2 °C) 97 %      MAP       --         Physical Exam    Nursing note and vitals reviewed.  Constitutional: She appears well-developed and well-nourished. She is Obese .   HENT:   Head: Normocephalic and atraumatic.   Eyes: Conjunctivae are normal.   Neck: Normal range of motion and phonation normal. Neck supple.   Cardiovascular: Normal rate and intact distal pulses.   Pulmonary/Chest: No stridor. No respiratory distress.   Musculoskeletal: Normal range of motion.   Neurological: She is alert and oriented to person, place, and time.   Skin: Skin is warm and dry. Capillary refill takes less than 2 seconds. Abrasion noted. No rash noted.        Psychiatric: She has a normal mood and affect. Her behavior is normal.         ED Course   Procedures  Labs Reviewed - No data to display       Imaging Results    None          Medical Decision Making:   History:   Old Medical Records: I decided to obtain old medical records.            Scribe Attestation:   Scribe #1: I performed the above scribed service and the documentation accurately describes the services I performed. I attest to the accuracy of the note.      Labs Reviewed  No visits with results within 1 Day(s) from this visit.   Latest known  visit with results is:   Admission on 06/03/2018, Discharged on 06/09/2018   No results displayed because visit has over 200 results.           Imaging Reviewed    Imaging Results    None         Medications given in ED    Medications   tetanus and diphther. tox (PF)(TD) (0.5 mLs Intramuscular Given 2/6/19 0228)   neomycin-bacitracnZn-polymyxnB packet 1 each (1 each Topical (Top) Given 2/6/19 0229)       This document was produced by a scribe under my direction and in my presence. I agree with the content of the note and have made any necessary edits.     Elzbieta Sinclair MD         Note was created using voice recognition software. Note may have occasional typographical errors that may not have been identified and edited despite good libia initial review prior to signing.           Discharge Medications     Discharge Medication List as of 2/6/2019  2:31 AM      START taking these medications    Details   amoxicillin-clavulanate 875-125mg (AUGMENTIN) 875-125 mg per tablet Take 1 tablet by mouth 2 (two) times daily. for 10 days, Starting Wed 2/6/2019, Until Sat 2/16/2019, Normal      mupirocin (BACTROBAN) 2 % ointment Apply topically 2 (two) times daily. for 10 days, Starting Wed 2/6/2019, Until Sat 2/16/2019, Normal         CONTINUE these medications which have NOT CHANGED    Details   ADVAIR DISKUS 250-50 mcg/dose diskus inhaler Starting 5/2/2013, Until Discontinued, Historical Med      aspirin (ECOTRIN) 81 MG EC tablet Take 1 tablet (81 mg total) by mouth once daily., Starting Thu 1/11/2018, Until Fri 1/11/2019, OTC      atorvastatin (LIPITOR) 20 MG tablet Take 1 tablet (20 mg total) by mouth every evening., Starting Thu 1/11/2018, Until Fri 1/11/2019, Normal      carvedilol (COREG) 25 MG tablet Take 1 tablet (25 mg total) by mouth 2 (two) times daily., Starting Thu 1/11/2018, Until Fri 1/11/2019, Normal      diclofenac sodium (VOLTAREN) 1 % Gel Apply 2 g topically 4 (four) times daily., Starting 2/26/2014, Until  "Discontinued, Normal      furosemide (LASIX) 20 MG tablet Take 1 tablet (20 mg total) by mouth once daily., Starting Fri 1/12/2018, Until Sat 1/12/2019, Normal      gabapentin (NEURONTIN) 600 MG tablet Starting 4/15/2013, Until Discontinued, Historical Med      LEVEMIR FLEXPEN 100 unit/mL (3 mL) InPn Starting 4/13/2013, Until Discontinued, Historical Med      NOVOLOG FLEXPEN 100 unit/mL InPn Starting 5/26/2013, Until Discontinued, Historical Med      oxyCODONE (OXYCONTIN) 30 mg TR12 12 hr tablet Take 30 mg by mouth every 12 (twelve) hours., Historical Med      oxyCODONE-acetaminophen (PERCOCET)  mg per tablet Take 1 tablet by mouth every 4 (four) hours as needed for Pain., Historical Med      pantoprazole (PROTONIX) 40 MG tablet Take 1 tablet (40 mg total) by mouth once daily., Starting Thu 1/11/2018, Until Fri 1/11/2019, Normal      potassium chloride (MICRO-K) 10 MEQ CpSR Take 10 mEq by mouth once daily.  , Until Discontinued, Historical Med      quetiapine (SEROQUEL) 100 MG Tab Take by mouth every evening., Until Discontinued, Historical Med      sertraline (ZOLOFT) 100 MG tablet Starting 5/2/2013, Until Discontinued, Historical Med      SURE COMFORT PEN NEEDLE 31 x 3/16 " Ndle Starting 5/8/2013, Until Discontinued, Historical Med      trazodone (DESYREL) 100 MG tablet Take 100 mg by mouth every evening., Until Discontinued, Historical Med                   Patient discharged to home in stable condition with instructions to:   1. Please take all meds as prescribed.  2. Follow-up with your primary care doctor   3. Return precautions discussed and patient and/or family/caretaker understands to return to the emergency room for any concerns including worsening of your current symptoms, fever, chills, night sweats, worsening pain, chest pain, shortness of breath, nausea, vomiting, diarrhea, bleeding, headache, difficulty talking, visual disturbances, weakness, numbness or any other acute concerns       Clinical " Impression:     1. Multiple abrasions    2. Cat bite of right foot with infection, initial encounter    3. Cat bite of right lower leg with infection, initial encounter                                 Elzbieta Sinclair MD  02/11/19 8935

## 2019-09-27 ENCOUNTER — HOSPITAL ENCOUNTER (EMERGENCY)
Facility: HOSPITAL | Age: 69
Discharge: HOME | End: 2019-09-27
Attending: INTERNAL MEDICINE
Payer: MEDICARE

## 2019-09-27 VITALS
OXYGEN SATURATION: 96 % | DIASTOLIC BLOOD PRESSURE: 61 MMHG | HEART RATE: 89 BPM | RESPIRATION RATE: 18 BRPM | HEIGHT: 59 IN | TEMPERATURE: 98 F | BODY MASS INDEX: 41.33 KG/M2 | WEIGHT: 205 LBS | SYSTOLIC BLOOD PRESSURE: 121 MMHG

## 2019-09-27 DIAGNOSIS — M54.50 ACUTE BILATERAL LOW BACK PAIN WITHOUT SCIATICA: Primary | ICD-10-CM

## 2019-09-27 LAB
BILIRUBIN, POC UA: NEGATIVE
BLOOD, POC UA: NEGATIVE
CLARITY, POC UA: CLEAR
COLOR, POC UA: YELLOW
GLUCOSE, POC UA: NEGATIVE
KETONES, POC UA: NEGATIVE
LEUKOCYTE EST, POC UA: NEGATIVE
NITRITE, POC UA: NEGATIVE
PH UR STRIP: 6 [PH]
PROTEIN, POC UA: NEGATIVE
SPECIFIC GRAVITY, POC UA: 1.01
UROBILINOGEN, POC UA: 0.2 E.U./DL

## 2019-09-27 PROCEDURE — 81003 URINALYSIS AUTO W/O SCOPE: CPT | Mod: ER

## 2019-09-27 PROCEDURE — 99284 EMERGENCY DEPT VISIT MOD MDM: CPT | Mod: 25,ER

## 2019-09-27 PROCEDURE — 96372 THER/PROPH/DIAG INJ SC/IM: CPT | Mod: ER

## 2019-09-27 PROCEDURE — 63600175 PHARM REV CODE 636 W HCPCS: Mod: ER | Performed by: NURSE PRACTITIONER

## 2019-09-27 RX ORDER — ORPHENADRINE CITRATE 30 MG/ML
30 INJECTION INTRAMUSCULAR; INTRAVENOUS
Status: COMPLETED | OUTPATIENT
Start: 2019-09-27 | End: 2019-09-27

## 2019-09-27 RX ORDER — KETOROLAC TROMETHAMINE 30 MG/ML
10 INJECTION, SOLUTION INTRAMUSCULAR; INTRAVENOUS
Status: COMPLETED | OUTPATIENT
Start: 2019-09-27 | End: 2019-09-27

## 2019-09-27 RX ORDER — TIZANIDINE 2 MG/1
2 TABLET ORAL EVERY 6 HOURS PRN
Qty: 12 TABLET | Refills: 0 | Status: SHIPPED | OUTPATIENT
Start: 2019-09-27

## 2019-09-27 RX ORDER — SULINDAC 150 MG/1
150 TABLET ORAL 2 TIMES DAILY
Qty: 10 TABLET | Refills: 0 | Status: SHIPPED | OUTPATIENT
Start: 2019-09-27

## 2019-09-27 RX ORDER — DEXAMETHASONE SODIUM PHOSPHATE 4 MG/ML
8 INJECTION, SOLUTION INTRA-ARTICULAR; INTRALESIONAL; INTRAMUSCULAR; INTRAVENOUS; SOFT TISSUE
Status: COMPLETED | OUTPATIENT
Start: 2019-09-27 | End: 2019-09-27

## 2019-09-27 RX ADMIN — KETOROLAC TROMETHAMINE 10 MG: 30 INJECTION, SOLUTION INTRAMUSCULAR; INTRAVENOUS at 06:09

## 2019-09-27 RX ADMIN — DEXAMETHASONE SODIUM PHOSPHATE 8 MG: 4 INJECTION, SOLUTION INTRA-ARTICULAR; INTRALESIONAL; INTRAMUSCULAR; INTRAVENOUS; SOFT TISSUE at 06:09

## 2019-09-27 RX ADMIN — ORPHENADRINE CITRATE 30 MG: 60 INJECTION INTRAMUSCULAR; INTRAVENOUS at 06:09

## 2019-09-27 NOTE — DISCHARGE INSTRUCTIONS
You have been given an anti-inflammatory (clinoril (sulindac)) prescription.  While taking this medication, do not use ibuprofen, motrin, advil, aleve, or any other anti-inflammatory. You have been given a muscle relaxer (flexeril (cyclobenzapril)) prescription. While taking this medication, do not drive, operate heavy machinery or  drink alcohol.Return to the Emergency department for any worsening or failure to improve, otherwise follow up with your primary care provider. Do not take medications prescribed for home use until at least 8h after injections given in ED.

## 2019-09-27 NOTE — ED PROVIDER NOTES
Encounter Date: 9/27/2019       History     Chief Complaint   Patient presents with    Flank Pain     bilateral flank and lower back pain, onset yesterday, increasing     Chief complaint:  Bilateral flank pain    History of present illness:  Patient is a 68-year-old female who reports 2 days of bilateral flank pain/lower back pain that is constant and aching.  She reports the pain is very severe.  States Percocet 10s were ineffective for the relief of discomfort and leaning to either side causes the pain to be worse.  She reports subjective fever but denies chills, decreased appetite, dysuria, hematuria, bowel or bladder incontinence, rash, constipation, diarrhea, nausea or vomiting, abdominal pain.  She endorses numbness and tingling last night that is now resolved and urinary frequency and urgency.    The history is provided by the patient, a relative and medical records. No  was used.     Review of patient's allergies indicates:  No Known Allergies  Past Medical History:   Diagnosis Date    Anxiety     Arthritis     Asthma     Bronchiectasis with acute exacerbation     Bronchitis     Chronic pain     right knee    Coronary artery disease involving native coronary artery of native heart without angina pectoris 1/10/2018    Essential hypertension, benign 11/21/2012    Hypotension, iatrogenic     Insomnia     Knee pain, right     chronic    Mitral valve prolapse     Obstructive chronic bronchitis without exacerbation 11/21/2012    Type II or unspecified type diabetes mellitus without mention of complication, uncontrolled     Type II or unspecified type diabetes mellitus without mention of complication, uncontrolled      Past Surgical History:   Procedure Laterality Date    bowel reconstruction      BREAST SURGERY      CHOLECYSTECTOMY      HYSTERECTOMY      KNEE SURGERY      R, surgeries x 7    Right Leg reconstruction surgery      Rotator Cuff Surgery      TONSILLECTOMY       WRIST SURGERY       Family History   Problem Relation Age of Onset    Heart disease Sister     Heart disease Brother     Hypertension Mother      Social History     Tobacco Use    Smoking status: Current Every Day Smoker     Packs/day: 0.25     Years: 45.00     Pack years: 11.25     Types: Cigarettes    Smokeless tobacco: Never Used   Substance Use Topics    Alcohol use: No    Drug use: Yes     Types: Marijuana     Review of Systems   Constitutional: Positive for fever. Negative for chills and fatigue.   HENT: Negative for congestion, ear discharge, ear pain, postnasal drip, rhinorrhea, sinus pressure, sneezing, sore throat and voice change.    Eyes: Negative for discharge and itching.   Respiratory: Negative for cough, shortness of breath and wheezing.    Cardiovascular: Negative for chest pain, palpitations and leg swelling.   Gastrointestinal: Negative for abdominal pain, constipation, diarrhea, nausea and vomiting.   Endocrine: Negative for polydipsia, polyphagia and polyuria.   Genitourinary: Positive for frequency and urgency. Negative for dysuria, hematuria, vaginal bleeding, vaginal discharge and vaginal pain.   Musculoskeletal: Positive for back pain. Negative for arthralgias and myalgias.   Skin: Negative for rash and wound.   Neurological: Positive for numbness. Negative for dizziness, seizures, syncope and weakness.   Hematological: Negative for adenopathy. Does not bruise/bleed easily.   Psychiatric/Behavioral: Negative for self-injury and suicidal ideas. The patient is not nervous/anxious.        Physical Exam     Initial Vitals [09/27/19 1713]   BP Pulse Resp Temp SpO2   127/67 100 20 99.3 °F (37.4 °C) 97 %      MAP       --         Physical Exam    Nursing note and vitals reviewed.  Constitutional: She appears well-developed and well-nourished.   HENT:   Head: Normocephalic and atraumatic.   Right Ear: External ear normal.   Left Ear: External ear normal.   Nose: Nose normal.   Eyes:  Conjunctivae and EOM are normal. Pupils are equal, round, and reactive to light. Right eye exhibits no discharge. Left eye exhibits no discharge.   Neck: Normal range of motion.   Abdominal: She exhibits no distension.   Musculoskeletal: Normal range of motion.   Spine is atraumatic, without step-offs or tenderness.    Neurological: She is alert and oriented to person, place, and time. She has normal strength. No cranial nerve deficit or sensory deficit. She exhibits normal muscle tone. She displays a negative Romberg sign. Coordination and gait normal. GCS eye subscore is 4. GCS verbal subscore is 5. GCS motor subscore is 6.   Equal  strength bilaterally, equal bicep flexion and tricep extension strength, leg extension and flexion strength appropriate and equal   Skin: Skin is dry. Capillary refill takes less than 2 seconds.         ED Course   Procedures  Labs Reviewed   POCT URINALYSIS W/O SCOPE          Imaging Results    None          Medical Decision Making:   Initial Assessment:   68-year-old female who reports bilateral flank pain. Pain is exacerbated by movement.  She has taken Percocet without effectiveness.  Physical examination is without abnormality.  Patient uses a walker to ambulate.  Differential Diagnosis:   Bilateral sciatica, UTI, pyelonephritis  ED Management:  Initial orders included Toradol 10 mg, Norflex 30 mg IM, urinalysis                   ED Course as of Sep 27 1908   Fri Sep 27, 2019   1825 UA is negative for infection, no nitrites, leukocytes, blood, or protein present.     POCT URINALYSIS W/O SCOPE [VC]   1844 Pt reports last a1c was 7.0.      [VC]      ED Course User Index  [VC] Cosme Ballesteros DNP     Clinical Impression:       ICD-10-CM ICD-9-CM   1. Acute bilateral low back pain without sciatica M54.5 724.2     338.19     Patient verbalized that her A1c when last taken was at 7.0.  I have authorized a dexamethasone 8 mg IM injection.  Patient understands that her blood sugar  will be elevated secondary to this medication and if it goes over 250 she should call her primary care provider for advice.  She understands she is to take her blood sugar more frequently than she usually does --3 times a day.  Patient to return for any worsening or changes in condition. I prescribed Zanaflex 2 mg p.o. q.6 hours p.r.n. and Clinoril 150 mg p.o. b.i.d..  Drowsy warning provided.  Symptomatic therapies and return precautions on AVS.   Medication choices were made after reviewing allergies, medications, history, available laboratories.     Disposition:   Disposition: Discharged  Condition: Stable                        Cosme Ballesteros, [facility]  09/27/19 1911

## 2019-09-28 RX ORDER — SULINDAC 150 MG/1
TABLET ORAL
Qty: 180 TABLET | Refills: 0 | OUTPATIENT
Start: 2019-09-28

## 2019-09-29 ENCOUNTER — HOSPITAL ENCOUNTER (EMERGENCY)
Facility: HOSPITAL | Age: 69
Discharge: HOME | End: 2019-09-29
Attending: EMERGENCY MEDICINE
Payer: MEDICARE

## 2019-09-29 VITALS
WEIGHT: 211 LBS | SYSTOLIC BLOOD PRESSURE: 142 MMHG | BODY MASS INDEX: 42.54 KG/M2 | TEMPERATURE: 98 F | HEIGHT: 59 IN | HEART RATE: 91 BPM | DIASTOLIC BLOOD PRESSURE: 69 MMHG | OXYGEN SATURATION: 98 % | RESPIRATION RATE: 17 BRPM

## 2019-09-29 DIAGNOSIS — R07.81 RIB PAIN: ICD-10-CM

## 2019-09-29 DIAGNOSIS — S29.011A MUSCLE STRAIN OF CHEST WALL, INITIAL ENCOUNTER: Primary | ICD-10-CM

## 2019-09-29 LAB — POCT GLUCOSE: 130 MG/DL (ref 70–110)

## 2019-09-29 PROCEDURE — 81003 URINALYSIS AUTO W/O SCOPE: CPT | Mod: ER

## 2019-09-29 PROCEDURE — 96372 THER/PROPH/DIAG INJ SC/IM: CPT | Mod: ER

## 2019-09-29 PROCEDURE — 99284 EMERGENCY DEPT VISIT MOD MDM: CPT | Mod: 25,ER

## 2019-09-29 PROCEDURE — 63600175 PHARM REV CODE 636 W HCPCS: Mod: ER | Performed by: PHYSICIAN ASSISTANT

## 2019-09-29 RX ORDER — HYDROMORPHONE HYDROCHLORIDE 2 MG/ML
1 INJECTION, SOLUTION INTRAMUSCULAR; INTRAVENOUS; SUBCUTANEOUS
Status: COMPLETED | OUTPATIENT
Start: 2019-09-29 | End: 2019-09-29

## 2019-09-29 RX ADMIN — HYDROMORPHONE HYDROCHLORIDE 1 MG: 2 INJECTION, SOLUTION INTRAMUSCULAR; INTRAVENOUS; SUBCUTANEOUS at 09:09

## 2019-09-30 NOTE — ED PROVIDER NOTES
Encounter Date: 9/29/2019       History     Chief Complaint   Patient presents with    Back Pain     pt reports she has been having lower back pain that radiates to both sides of her abdomen x 3 days. pt reports she was previously seen for this problem but pain has not been relieved.      68-year-old female with history anxiety, arthritis, asthma, bronchitis, bronchiectasis, chronic right knee pain, hypertension, tobacco abuse, CAD, insomnia, MVP, IDDM, presents to ED with chief complaint 4 day history of bilateral flank pain. No urinary complaints. No vaginal complaints. No trauma. No cough.  No recent illness.  Pain is constant.  Pain is alleviated with lying still, pain is worsened with movement.  No chest pain.  No shortness of breath. No dyspnea on exertion.  No pleuritic nature to pain. No rash. No abdominal pain. Pt seen in this ED 2 days ago with similar presentation. Denies relief.  No radiculopathy or paresthesia.  Symptoms are acute, constant, severity 8/10.  Aching type pain, sharp with movement.        Review of patient's allergies indicates:  No Known Allergies  Past Medical History:   Diagnosis Date    Anxiety     Arthritis     Asthma     Bronchiectasis with acute exacerbation     Bronchitis     Chronic pain     right knee    Coronary artery disease involving native coronary artery of native heart without angina pectoris 1/10/2018    Essential hypertension, benign 11/21/2012    Hypotension, iatrogenic     Insomnia     Knee pain, right     chronic    Mitral valve prolapse     Obstructive chronic bronchitis without exacerbation 11/21/2012    Type II or unspecified type diabetes mellitus without mention of complication, uncontrolled     Type II or unspecified type diabetes mellitus without mention of complication, uncontrolled      Past Surgical History:   Procedure Laterality Date    bowel reconstruction      BREAST SURGERY      CHOLECYSTECTOMY      HYSTERECTOMY      KNEE SURGERY       R, surgeries x 7    Right Leg reconstruction surgery      Rotator Cuff Surgery      TONSILLECTOMY      WRIST SURGERY       Family History   Problem Relation Age of Onset    Heart disease Sister     Heart disease Brother     Hypertension Mother      Social History     Tobacco Use    Smoking status: Current Every Day Smoker     Packs/day: 0.25     Years: 45.00     Pack years: 11.25     Types: Cigarettes    Smokeless tobacco: Never Used   Substance Use Topics    Alcohol use: No    Drug use: Yes     Types: Marijuana     Review of Systems   Constitutional: Negative for chills and fever.   HENT: Negative for sore throat.    Eyes: Negative.    Respiratory: Negative for chest tightness and shortness of breath.    Cardiovascular: Negative for chest pain.   Gastrointestinal: Negative for abdominal pain, constipation, diarrhea, nausea and vomiting.   Endocrine: Negative.    Genitourinary: Positive for flank pain. Negative for dysuria.   Musculoskeletal: Negative for back pain, neck pain and neck stiffness.   Skin: Negative for rash.   Neurological: Negative for weakness and headaches.   Hematological: Does not bruise/bleed easily.   Psychiatric/Behavioral: Negative.    All other systems reviewed and are negative.      Physical Exam     Initial Vitals [09/29/19 2005]   BP Pulse Resp Temp SpO2   (!) 142/69 91 17 98.1 °F (36.7 °C) 98 %      MAP       --         Physical Exam    Nursing note and vitals reviewed.  Constitutional: She appears well-developed and well-nourished. She is not diaphoretic. No distress.   Uncomfortable appearing, but nontoxic.    HENT:   Head: Normocephalic and atraumatic.   Eyes: Conjunctivae and EOM are normal. Pupils are equal, round, and reactive to light.   Neck: Normal range of motion. Neck supple. No tracheal deviation present.   Cardiovascular: Intact distal pulses.   Pulmonary/Chest: No stridor.   Faint expir wheeze throughout lungs. No hypoxia on room air. No tachypnea. No accessory   Muscle use. TTP bilateral posterolateral lower ribs.    Abdominal: There is no tenderness.   Abdomen overall soft, normal bowel sounds ×4.  No significant tenderness to palpation of any quadrant.  No rebound or guarding.  No palpable mass or distention.  Mild bilateral flank ttp.  Negative Edge sign.  No pain over McBurney's point.   Lymphadenopathy:     She has no cervical adenopathy.   Neurological: She is alert and oriented to person, place, and time. GCS score is 15. GCS eye subscore is 4. GCS verbal subscore is 5. GCS motor subscore is 6.   Skin: Skin is warm and dry. Capillary refill takes less than 2 seconds.   Psychiatric: She has a normal mood and affect. Her behavior is normal. Judgment and thought content normal.         ED Course   Procedures  Labs Reviewed   POCT GLUCOSE - Abnormal; Notable for the following components:       Result Value    POCT Glucose 130 (*)     All other components within normal limits   POCT URINALYSIS W/O SCOPE   POCT GLUCOSE MONITORING CONTINUOUS          Imaging Results          X-Ray Chest PA And Lateral (Final result)  Result time 09/29/19 20:46:04    Final result by Bryce Hansen MD (09/29/19 20:46:04)                 Impression:      No detrimental change or radiographic acute intrathoracic process seen.      Electronically signed by: Bryce Hansen MD  Date:    09/29/2019  Time:    20:46             Narrative:    EXAMINATION:  XR CHEST PA AND LATERAL    CLINICAL HISTORY:  Pleurodynia    TECHNIQUE:  PA and lateral views of the chest were performed.    COMPARISON:  Chest radiograph 06/03/2018    FINDINGS:  Large body habitus.  No detrimental change.  Cardiomediastinal silhouette is midline and similar to prior allowing for magnification by chest wall and slight patient rotation.  Few scattered linear opacities consistent with subsegmental scarring versus atelectasis.  The lungs are otherwise symmetrically well expanded without large consolidation, pleural effusion or  pneumothorax.  No acute osseous process seen.                              X-Rays:   Independently Interpreted Readings:   Other Readings:  Chest x-ray without consolidation, effusion, or pneumothorax.  There is cardiomegaly.  No significant change from previous.  No obvious bony abnormality.    Medical Decision Making:   Differential Diagnosis:   Costochondritis, fracture, contusion, sprain/strain, arthritis, pyelonephritis  ED Management:  Wheeze likely baseline given resp diagnoses, tobacco abuse. No CP, SOB, or GRACE. No pleuritic pain. Xray negative for consolidation, pneumo, effusion. No bony abnormality or trauma. TTP bilateral posterolateral ribs. Pain reproducible with palpation, with certain movements.    I think this is likely muscular. I've asked her to continue with previously prescribed medications, PCP f/u. Pain control in ED. Return precautions discussed.                       Clinical Impression:       ICD-10-CM ICD-9-CM   1. Muscle strain of chest wall, initial encounter S29.011A 848.8   2. Rib pain R07.81 786.50         Disposition:   Disposition: Discharged  Condition: Stable                        Scott Mg PA-C  09/29/19 2129

## 2019-09-30 NOTE — DISCHARGE INSTRUCTIONS
Continue current pain medicine regimen.  Follow up with your primary.    Please return to this ED if you begin with shortness of breath or chest pain, if pain worsens despite treatment, if you begin with nausea vomiting, if you begin fever, if any other problems occur.

## 2019-09-30 NOTE — ED NOTES
"Reports that her pain started on 9/26. Reports being seen here on 9/27 for her LBP that radiates to bilateral flanks. Was seen by the NP and was given 3 shots, then discharged. Pt here today for worsening of same symptoms. Pain is 10/10 and "feels like someone is kicking me in my side." Denies the following: No hematuria, no dysuria, no N/V/D, no SOB, no chest pain.   "

## 2019-11-29 ENCOUNTER — HOSPITAL ENCOUNTER (EMERGENCY)
Facility: HOSPITAL | Age: 69
Discharge: HOME | End: 2019-11-29
Attending: EMERGENCY MEDICINE
Payer: MEDICARE

## 2019-11-29 VITALS
TEMPERATURE: 98 F | OXYGEN SATURATION: 95 % | BODY MASS INDEX: 41.23 KG/M2 | WEIGHT: 210 LBS | SYSTOLIC BLOOD PRESSURE: 164 MMHG | HEIGHT: 60 IN | RESPIRATION RATE: 18 BRPM | HEART RATE: 98 BPM | DIASTOLIC BLOOD PRESSURE: 80 MMHG

## 2019-11-29 DIAGNOSIS — R52 PAIN: ICD-10-CM

## 2019-11-29 DIAGNOSIS — M79.89 SWELLING OF RIGHT HAND: Primary | ICD-10-CM

## 2019-11-29 DIAGNOSIS — M19.041 ARTHRITIS OF RIGHT HAND: ICD-10-CM

## 2019-11-29 DIAGNOSIS — M25.431 PAIN AND SWELLING OF RIGHT WRIST: ICD-10-CM

## 2019-11-29 DIAGNOSIS — M25.531 PAIN AND SWELLING OF RIGHT WRIST: ICD-10-CM

## 2019-11-29 LAB — POCT GLUCOSE: 134 MG/DL (ref 70–110)

## 2019-11-29 PROCEDURE — 96372 THER/PROPH/DIAG INJ SC/IM: CPT | Mod: ER

## 2019-11-29 PROCEDURE — 63600175 PHARM REV CODE 636 W HCPCS: Mod: ER | Performed by: EMERGENCY MEDICINE

## 2019-11-29 PROCEDURE — 99284 EMERGENCY DEPT VISIT MOD MDM: CPT | Mod: 25,ER

## 2019-11-29 RX ORDER — SULFAMETHOXAZOLE AND TRIMETHOPRIM 800; 160 MG/1; MG/1
1 TABLET ORAL 2 TIMES DAILY
Qty: 14 TABLET | Refills: 0 | Status: SHIPPED | OUTPATIENT
Start: 2019-11-29 | End: 2019-12-09

## 2019-11-29 RX ORDER — DEXAMETHASONE SODIUM PHOSPHATE 4 MG/ML
8 INJECTION, SOLUTION INTRA-ARTICULAR; INTRALESIONAL; INTRAMUSCULAR; INTRAVENOUS; SOFT TISSUE
Status: COMPLETED | OUTPATIENT
Start: 2019-11-29 | End: 2019-11-29

## 2019-11-29 RX ORDER — DICLOFENAC SODIUM 10 MG/G
2 GEL TOPICAL EVERY 6 HOURS PRN
Qty: 100 G | Refills: 0 | Status: SHIPPED | OUTPATIENT
Start: 2019-11-29

## 2019-11-29 RX ADMIN — DEXAMETHASONE SODIUM PHOSPHATE 8 MG: 4 INJECTION, SOLUTION INTRA-ARTICULAR; INTRALESIONAL; INTRAMUSCULAR; INTRAVENOUS; SOFT TISSUE at 03:11

## 2019-11-29 NOTE — ED PROVIDER NOTES
Encounter Date: 11/29/2019    SCRIBE #1 NOTE: I, Elizabeth Concepcion, am scribing for, and in the presence of,  Dr. Sinclair. I have scribed the following portions of the note - Other sections scribed: HPI, ROS, PE.       History     Chief Complaint   Patient presents with    HAND SWELLING     PT REPORTS SWELLING TO RIGHT HAND SINCE TUESDAY, DENIES ANY INJURY     Raissa Land is a 69 y.o. female with asthma, anxiety, arthritis, insulin dependent type 2 DM, HTN, and CAD who presents to the ED complaining of swelling and throbbing pain to the right hand for 3 days. Patient has no history of swelling to the right hand. Denies any injuries or trauma. Patient is right hand dominant. Does not drink EtOH. Denies fever, weakness, and insect bite. Not on blood thinners. No history of gout. No known allergies. Tetanus is UTD.    The history is provided by the patient. No  was used.     Review of patient's allergies indicates:  No Known Allergies  Past Medical History:   Diagnosis Date    Anxiety     Arthritis     Asthma     Bronchiectasis with acute exacerbation     Bronchitis     Chronic pain     right knee    Coronary artery disease involving native coronary artery of native heart without angina pectoris 1/10/2018    Essential hypertension, benign 11/21/2012    Hypotension, iatrogenic     Insomnia     Knee pain, right     chronic    Mitral valve prolapse     Obstructive chronic bronchitis without exacerbation 11/21/2012    Type II or unspecified type diabetes mellitus without mention of complication, uncontrolled     Type II or unspecified type diabetes mellitus without mention of complication, uncontrolled      Past Surgical History:   Procedure Laterality Date    bowel reconstruction      BREAST SURGERY      CHOLECYSTECTOMY      HYSTERECTOMY      KNEE SURGERY      R, surgeries x 7    Right Leg reconstruction surgery      Rotator Cuff Surgery      TONSILLECTOMY      WRIST SURGERY       Family  History   Problem Relation Age of Onset    Heart disease Sister     Heart disease Brother     Hypertension Mother      Social History     Tobacco Use    Smoking status: Current Every Day Smoker     Packs/day: 0.25     Years: 45.00     Pack years: 11.25     Types: Cigarettes    Smokeless tobacco: Never Used   Substance Use Topics    Alcohol use: No    Drug use: Yes     Types: Marijuana     Review of Systems   Constitutional: Negative for fever.   Musculoskeletal:        Positive right hand swelling.   Skin: Negative for wound.   Neurological: Negative for weakness.   All other systems reviewed and are negative.      Physical Exam     Initial Vitals [11/29/19 0104]   BP Pulse Resp Temp SpO2   (!) 169/85 97 (!) 22 98.3 °F (36.8 °C) 95 %      MAP       --         Physical Exam    Nursing note and vitals reviewed.  Constitutional: She appears well-developed and well-nourished.   HENT:   Head: Normocephalic and atraumatic.   Eyes: Conjunctivae are normal.   Neck: Normal range of motion and phonation normal. Neck supple.   Cardiovascular: Normal rate and intact distal pulses.   Pulses:       Radial pulses are 2+ on the right side.   Pulmonary/Chest: Effort normal. No stridor. No respiratory distress.   Musculoskeletal: Normal range of motion.        Right wrist: She exhibits tenderness.        Right hand: She exhibits tenderness and swelling. She exhibits normal range of motion. Normal sensation noted.   edemea to the finger and hand wihtou warmth or eryth  Trace linear eryteha n adwamrh around the wirst. Mild reduced ROM of the wirst.  ROM of the hand is normal.   Neurological: She is alert and oriented to person, place, and time.   Skin: Skin is warm and dry. Capillary refill takes less than 2 seconds. No rash noted.   Psychiatric: She has a normal mood and affect. Her behavior is normal.         ED Course   Procedures  Labs Reviewed - No data to display       Imaging Results          X-Ray Hand 3 View Right  (Final result)  Result time 11/29/19 01:18:50   Procedure changed from X-Ray Hand 2 View Right     Final result by Rivka Enamorado MD (11/29/19 01:18:50)                 Impression:      No acute bony abnormality detected.  Degenerative changes.      Electronically signed by: Rivka Enamorado  Date:    11/29/2019  Time:    01:18             Narrative:    EXAMINATION:  THREE VIEWS OF THE RIGHT HAND    CLINICAL HISTORY:  Pain, unspecifiedpain;    TECHNIQUE:  AP, lateral, and oblique views of the right hand    COMPARISON:  None.    FINDINGS:  The digits are swollen.  Three views of the right hand demonstrate no acute fracture or dislocation.  Severe degenerative changes are seen at the 1st carpometacarpal joint, including joint space narrowing, sclerosing, and osteophytes.  Additional degenerative changes are seen at the interphalangeal joints.  There is narrowing of the radiocarpal joint.  Mild chondrocalcinosis is seen at the triangular fibrocartilage.  The bones are diffusely osteopenic.                                 Medical Decision Making:   History:   Old Medical Records: I decided to obtain old medical records.  Clinical Tests:   Radiological Study: Ordered and Reviewed    Labs Reviewed  No visits with results within 1 Day(s) from this visit.   Latest known visit with results is:   Admission on 09/29/2019, Discharged on 09/29/2019   Component Date Value Ref Range Status    POCT Glucose 09/29/2019 130* 70 - 110 mg/dL Final        Imaging Reviewed    Imaging Results          X-Ray Hand 3 View Right (Final result)  Result time 11/29/19 01:18:50   Procedure changed from X-Ray Hand 2 View Right     Final result by Rivka Enamorado MD (11/29/19 01:18:50)                 Impression:      No acute bony abnormality detected.  Degenerative changes.      Electronically signed by: Rivka Enamorado  Date:    11/29/2019  Time:    01:18             Narrative:    EXAMINATION:  THREE VIEWS OF THE RIGHT HAND    CLINICAL  HISTORY:  Pain, unspecifiedpain;    TECHNIQUE:  AP, lateral, and oblique views of the right hand    COMPARISON:  None.    FINDINGS:  The digits are swollen.  Three views of the right hand demonstrate no acute fracture or dislocation.  Severe degenerative changes are seen at the 1st carpometacarpal joint, including joint space narrowing, sclerosing, and osteophytes.  Additional degenerative changes are seen at the interphalangeal joints.  There is narrowing of the radiocarpal joint.  Mild chondrocalcinosis is seen at the triangular fibrocartilage.  The bones are diffusely osteopenic.                                Medications given in ED    Medications - No data to display    This document was produced by a scribe under my direction and in my presence. I agree with the content of the note and have made any necessary edits.     Elzbieta Sinclair MD         Note was created using voice recognition software. Note may have occasional typographical errors that may not have been identified and edited despite good libia initial review prior to signing.            Scribe Attestation:   Scribe #1: I performed the above scribed service and the documentation accurately describes the services I performed. I attest to the accuracy of the note.    ***                      Clinical Impression:     1. Pain

## 2019-11-29 NOTE — ED PROVIDER NOTES
Encounter Date: 11/29/2019    SCRIBE #1 NOTE: I, Elizabeth Concepcion, am scribing for, and in the presence of,  Dr. Sinclair. I have scribed the following portions of the note - Other sections scribed: HPI, ROS, PE.       History     Chief Complaint   Patient presents with    HAND SWELLING     PT REPORTS SWELLING TO RIGHT HAND SINCE TUESDAY, DENIES ANY INJURY     Raissa Land is a 69 y.o. female with asthma, anxiety, arthritis, type 2 DM, HTN, and CAD who presents to the ED complaining of swelling to the right hand for 3 days. Denies any injuries or trauma.    The history is provided by the patient. No  was used.     Review of patient's allergies indicates:  No Known Allergies  Past Medical History:   Diagnosis Date    Anxiety     Arthritis     Asthma     Bronchiectasis with acute exacerbation     Bronchitis     Chronic pain     right knee    Coronary artery disease involving native coronary artery of native heart without angina pectoris 1/10/2018    Essential hypertension, benign 11/21/2012    Hypotension, iatrogenic     Insomnia     Knee pain, right     chronic    Mitral valve prolapse     Obstructive chronic bronchitis without exacerbation 11/21/2012    Type II or unspecified type diabetes mellitus without mention of complication, uncontrolled     Type II or unspecified type diabetes mellitus without mention of complication, uncontrolled      Past Surgical History:   Procedure Laterality Date    bowel reconstruction      BREAST SURGERY      CHOLECYSTECTOMY      HYSTERECTOMY      KNEE SURGERY      R, surgeries x 7    Right Leg reconstruction surgery      Rotator Cuff Surgery      TONSILLECTOMY      WRIST SURGERY       Family History   Problem Relation Age of Onset    Heart disease Sister     Heart disease Brother     Hypertension Mother      Social History     Tobacco Use    Smoking status: Current Every Day Smoker     Packs/day: 0.25     Years: 45.00     Pack years: 11.25      Types: Cigarettes    Smokeless tobacco: Never Used   Substance Use Topics    Alcohol use: No    Drug use: Yes     Types: Marijuana     Review of Systems   Constitutional: Negative for fever.   Musculoskeletal: Positive for joint swelling.        Positive right hand swelling.   Skin: Negative for rash and wound.   Neurological: Negative for weakness and numbness.   All other systems reviewed and are negative.      Physical Exam     Initial Vitals [11/29/19 0104]   BP Pulse Resp Temp SpO2   (!) 169/85 97 (!) 22 98.3 °F (36.8 °C) 95 %      MAP       --         Physical Exam    Nursing note and vitals reviewed.  Constitutional: She appears well-developed and well-nourished.   HENT:   Head: Normocephalic and atraumatic.   Eyes: Conjunctivae are normal.   Neck: Normal range of motion and phonation normal. Neck supple.   Cardiovascular: Normal rate and intact distal pulses.   Pulmonary/Chest: Effort normal. No stridor. No respiratory distress.   Musculoskeletal:        Right wrist: She exhibits tenderness and swelling. She exhibits normal range of motion, no crepitus and no deformity.        Right hand: She exhibits tenderness and swelling. She exhibits normal range of motion, no bony tenderness, normal capillary refill, no deformity and no laceration. Normal sensation noted. Normal strength noted. She exhibits no finger abduction, no thumb/finger opposition and no wrist extension trouble.        Hands:  Neurological: She is alert and oriented to person, place, and time.   Skin: Skin is warm, dry and intact. Capillary refill takes less than 2 seconds. No abrasion, no bruising, no rash and no abscess noted.   Psychiatric: She has a normal mood and affect. Her behavior is normal.         ED Course   Procedures  Labs Reviewed   POCT GLUCOSE - Abnormal; Notable for the following components:       Result Value    POCT Glucose 134 (*)     All other components within normal limits          Imaging Results          X-Ray Hand 3  View Right (Final result)  Result time 11/29/19 01:18:50   Procedure changed from X-Ray Hand 2 View Right     Final result by Rivka Enamorado MD (11/29/19 01:18:50)                 Impression:      No acute bony abnormality detected.  Degenerative changes.      Electronically signed by: Rivka Enamorado  Date:    11/29/2019  Time:    01:18             Narrative:    EXAMINATION:  THREE VIEWS OF THE RIGHT HAND    CLINICAL HISTORY:  Pain, unspecifiedpain;    TECHNIQUE:  AP, lateral, and oblique views of the right hand    COMPARISON:  None.    FINDINGS:  The digits are swollen.  Three views of the right hand demonstrate no acute fracture or dislocation.  Severe degenerative changes are seen at the 1st carpometacarpal joint, including joint space narrowing, sclerosing, and osteophytes.  Additional degenerative changes are seen at the interphalangeal joints.  There is narrowing of the radiocarpal joint.  Mild chondrocalcinosis is seen at the triangular fibrocartilage.  The bones are diffusely osteopenic.                                 Medical Decision Making:   History:   Old Medical Records: I decided to obtain old medical records.  Clinical Tests:   Radiological Study: Ordered and Reviewed    Labs Reviewed  Admission on 11/29/2019, Discharged on 11/29/2019   Component Date Value Ref Range Status    POCT Glucose 11/29/2019 134* 70 - 110 mg/dL Final        Imaging Reviewed    Imaging Results          X-Ray Hand 3 View Right (Final result)  Result time 11/29/19 01:18:50   Procedure changed from X-Ray Hand 2 View Right     Final result by Rivka Enamorado MD (11/29/19 01:18:50)                 Impression:      No acute bony abnormality detected.  Degenerative changes.      Electronically signed by: Rivka Enamorado  Date:    11/29/2019  Time:    01:18             Narrative:    EXAMINATION:  THREE VIEWS OF THE RIGHT HAND    CLINICAL HISTORY:  Pain, unspecifiedpain;    TECHNIQUE:  AP, lateral, and oblique views of the  right hand    COMPARISON:  None.    FINDINGS:  The digits are swollen.  Three views of the right hand demonstrate no acute fracture or dislocation.  Severe degenerative changes are seen at the 1st carpometacarpal joint, including joint space narrowing, sclerosing, and osteophytes.  Additional degenerative changes are seen at the interphalangeal joints.  There is narrowing of the radiocarpal joint.  Mild chondrocalcinosis is seen at the triangular fibrocartilage.  The bones are diffusely osteopenic.                                Medications given in ED    Medications   dexamethasone injection 8 mg (8 mg Intramuscular Given 11/29/19 0312)       This document was produced by a scribe under my direction and in my presence. I agree with the content of the note and have made any necessary edits.     Elzbieta Sinclair MD         Note was created using voice recognition software. Note may have occasional typographical errors that may not have been identified and edited despite good libia initial review prior to signing.            Scribe Attestation:   Scribe #1: I performed the above scribed service and the documentation accurately describes the services I performed. I attest to the accuracy of the note.             Discharge Medications     Discharge Medication List as of 11/29/2019  3:18 AM      START taking these medications    Details   diclofenac sodium (VOLTAREN) 1 % Gel Apply 2 g topically every 6 (six) hours as needed. pain, Starting Fri 11/29/2019, Normal      sulfamethoxazole-trimethoprim 800-160mg (BACTRIM DS) 800-160 mg Tab Take 1 tablet by mouth 2 (two) times daily. for 10 days, Starting Fri 11/29/2019, Until Mon 12/9/2019, Normal         CONTINUE these medications which have NOT CHANGED    Details   ADVAIR DISKUS 250-50 mcg/dose diskus inhaler Starting 5/2/2013, Until Discontinued, Historical Med      aspirin (ECOTRIN) 81 MG EC tablet Take 1 tablet (81 mg total) by mouth once daily., Starting u 1/11/2018,  "Until Fri 1/11/2019, OTC      atorvastatin (LIPITOR) 20 MG tablet Take 1 tablet (20 mg total) by mouth every evening., Starting Thu 1/11/2018, Until Fri 1/11/2019, Normal      carvedilol (COREG) 25 MG tablet Take 1 tablet (25 mg total) by mouth 2 (two) times daily., Starting Thu 1/11/2018, Until Fri 1/11/2019, Normal      furosemide (LASIX) 20 MG tablet Take 1 tablet (20 mg total) by mouth once daily., Starting Fri 1/12/2018, Until Sat 1/12/2019, Normal      gabapentin (NEURONTIN) 600 MG tablet Starting 4/15/2013, Until Discontinued, Historical Med      LEVEMIR FLEXPEN 100 unit/mL (3 mL) InPn Starting 4/13/2013, Until Discontinued, Historical Med      NOVOLOG FLEXPEN 100 unit/mL InPn Starting 5/26/2013, Until Discontinued, Historical Med      oxyCODONE (OXYCONTIN) 30 mg TR12 12 hr tablet Take 30 mg by mouth every 12 (twelve) hours., Historical Med      oxyCODONE-acetaminophen (PERCOCET)  mg per tablet Take 1 tablet by mouth every 4 (four) hours as needed for Pain., Historical Med      pantoprazole (PROTONIX) 40 MG tablet Take 1 tablet (40 mg total) by mouth once daily., Starting Thu 1/11/2018, Until Fri 1/11/2019, Normal      potassium chloride (MICRO-K) 10 MEQ CpSR Take 10 mEq by mouth once daily.  , Until Discontinued, Historical Med      quetiapine (SEROQUEL) 100 MG Tab Take by mouth every evening., Until Discontinued, Historical Med      sertraline (ZOLOFT) 100 MG tablet Starting 5/2/2013, Until Discontinued, Historical Med      sulindac (CLINORIL) 150 MG tablet Take 1 tablet (150 mg total) by mouth 2 (two) times daily., Starting Fri 9/27/2019, Print      SURE COMFORT PEN NEEDLE 31 x 3/16 " Ndle Starting 5/8/2013, Until Discontinued, Historical Med      tiZANidine (ZANAFLEX) 2 MG tablet Take 1 tablet (2 mg total) by mouth every 6 (six) hours as needed (muscle spasm)., Starting Fri 9/27/2019, Print      trazodone (DESYREL) 100 MG tablet Take 100 mg by mouth every evening., Until Discontinued, Historical Med "                   Patient discharged to home in stable condition with instructions to:   1. Please take all meds as prescribed.  2. Follow-up with your primary care doctor   3. Return precautions discussed and patient and/or family/caretaker understands to return to the emergency room for any concerns including worsening of your current symptoms, fever, chills, night sweats, worsening pain, chest pain, shortness of breath, nausea, vomiting, diarrhea, bleeding, headache, difficulty talking, visual disturbances, weakness, numbness or any other acute concerns                  Clinical Impression:     1. Swelling of right hand    2. Pain    3. Pain and swelling of right wrist    4. Arthritis of right hand            Disposition:   Disposition: Discharged  Condition: Stable                     Elzbieta Sinclair MD  12/03/19 [id number]

## 2021-10-02 ENCOUNTER — HOSPITAL ENCOUNTER (EMERGENCY)
Facility: HOSPITAL | Age: 71
Discharge: HOME | End: 2021-10-02
Attending: EMERGENCY MEDICINE
Payer: MEDICARE

## 2021-10-02 VITALS
BODY MASS INDEX: 39.66 KG/M2 | RESPIRATION RATE: 18 BRPM | HEART RATE: 89 BPM | HEIGHT: 60 IN | DIASTOLIC BLOOD PRESSURE: 73 MMHG | TEMPERATURE: 99 F | OXYGEN SATURATION: 99 % | WEIGHT: 202 LBS | SYSTOLIC BLOOD PRESSURE: 142 MMHG

## 2021-10-02 DIAGNOSIS — H57.12 LEFT EYE PAIN: Primary | ICD-10-CM

## 2021-10-02 DIAGNOSIS — S05.02XA INJURY OF CONJUNCTIVA AND CORNEAL ABRASION OF LEFT EYE W/O FB, INITIAL ENCOUNTER: ICD-10-CM

## 2021-10-02 PROCEDURE — 99283 EMERGENCY DEPT VISIT LOW MDM: CPT | Mod: ER

## 2021-10-02 PROCEDURE — 25000003 PHARM REV CODE 250: Mod: ER | Performed by: EMERGENCY MEDICINE

## 2021-10-02 RX ORDER — MOXIFLOXACIN 5 MG/ML
1 SOLUTION/ DROPS OPHTHALMIC
Status: COMPLETED | OUTPATIENT
Start: 2021-10-02 | End: 2021-10-02

## 2021-10-02 RX ORDER — PROPARACAINE HYDROCHLORIDE 5 MG/ML
1 SOLUTION/ DROPS OPHTHALMIC
Status: COMPLETED | OUTPATIENT
Start: 2021-10-02 | End: 2021-10-02

## 2021-10-02 RX ORDER — MOXIFLOXACIN 5 MG/ML
1 SOLUTION/ DROPS OPHTHALMIC 3 TIMES DAILY
Qty: 1.4 ML | Refills: 0 | Status: SHIPPED | OUTPATIENT
Start: 2021-10-02 | End: 2021-10-09

## 2021-10-02 RX ADMIN — MOXIFLOXACIN 1 DROP: 5 SOLUTION/ DROPS OPHTHALMIC at 10:10

## 2021-10-02 RX ADMIN — FLUORESCEIN SODIUM 1 EACH: 1 STRIP OPHTHALMIC at 09:10

## 2021-10-02 RX ADMIN — PROPARACAINE HYDROCHLORIDE 1 DROP: 5 SOLUTION/ DROPS OPHTHALMIC at 09:10

## 2021-12-01 ENCOUNTER — OFFICE VISIT (OUTPATIENT)
Dept: PODIATRY | Facility: CLINIC | Age: 71
End: 2021-12-01
Payer: MEDICARE

## 2021-12-01 VITALS — WEIGHT: 201.94 LBS | BODY MASS INDEX: 39.65 KG/M2 | HEIGHT: 60 IN

## 2021-12-01 DIAGNOSIS — E11.49 TYPE II DIABETES MELLITUS WITH NEUROLOGICAL MANIFESTATIONS: Primary | ICD-10-CM

## 2021-12-01 DIAGNOSIS — L60.0 INGROWN NAIL: ICD-10-CM

## 2021-12-01 PROCEDURE — 99203 PR OFFICE/OUTPT VISIT, NEW, LEVL III, 30-44 MIN: ICD-10-PCS | Mod: S$GLB,,, | Performed by: PODIATRIST

## 2021-12-01 PROCEDURE — 99999 PR PBB SHADOW E&M-EST. PATIENT-LVL III: CPT | Mod: PBBFAC,,, | Performed by: PODIATRIST

## 2021-12-01 PROCEDURE — 4010F ACE/ARB THERAPY RXD/TAKEN: CPT | Mod: CPTII,S$GLB,, | Performed by: PODIATRIST

## 2021-12-01 PROCEDURE — 4010F PR ACE/ARB THEARPY RXD/TAKEN: ICD-10-PCS | Mod: CPTII,S$GLB,, | Performed by: PODIATRIST

## 2021-12-01 PROCEDURE — 99203 OFFICE O/P NEW LOW 30 MIN: CPT | Mod: S$GLB,,, | Performed by: PODIATRIST

## 2021-12-01 PROCEDURE — 99999 PR PBB SHADOW E&M-EST. PATIENT-LVL III: ICD-10-PCS | Mod: PBBFAC,,, | Performed by: PODIATRIST

## 2021-12-15 ENCOUNTER — PROCEDURE VISIT (OUTPATIENT)
Dept: PODIATRY | Facility: CLINIC | Age: 71
End: 2021-12-15
Payer: MEDICARE

## 2021-12-15 VITALS — BODY MASS INDEX: 39.65 KG/M2 | WEIGHT: 201.94 LBS | HEIGHT: 60 IN

## 2021-12-15 DIAGNOSIS — L60.0 INGROWN NAIL: ICD-10-CM

## 2021-12-15 DIAGNOSIS — E11.49 TYPE II DIABETES MELLITUS WITH NEUROLOGICAL MANIFESTATIONS: Primary | ICD-10-CM

## 2021-12-15 PROCEDURE — 11750 EXCISION NAIL&NAIL MATRIX: CPT | Mod: T5,S$GLB,, | Performed by: PODIATRIST

## 2021-12-15 PROCEDURE — 11750 NAIL REMOVAL: ICD-10-PCS | Mod: T5,S$GLB,, | Performed by: PODIATRIST

## 2021-12-22 ENCOUNTER — OFFICE VISIT (OUTPATIENT)
Dept: PODIATRY | Facility: CLINIC | Age: 71
End: 2021-12-22
Payer: MEDICARE

## 2021-12-22 VITALS — BODY MASS INDEX: 39.65 KG/M2 | WEIGHT: 201.94 LBS | HEIGHT: 60 IN

## 2021-12-22 DIAGNOSIS — Z98.890 S/P NAIL SURGERY: Primary | ICD-10-CM

## 2021-12-22 PROCEDURE — 1101F PT FALLS ASSESS-DOCD LE1/YR: CPT | Mod: CPTII,S$GLB,, | Performed by: PODIATRIST

## 2021-12-22 PROCEDURE — 1101F PR PT FALLS ASSESS DOC 0-1 FALLS W/OUT INJ PAST YR: ICD-10-PCS | Mod: CPTII,S$GLB,, | Performed by: PODIATRIST

## 2021-12-22 PROCEDURE — 99999 PR PBB SHADOW E&M-EST. PATIENT-LVL III: ICD-10-PCS | Mod: PBBFAC,,, | Performed by: PODIATRIST

## 2021-12-22 PROCEDURE — 4010F ACE/ARB THERAPY RXD/TAKEN: CPT | Mod: CPTII,S$GLB,, | Performed by: PODIATRIST

## 2021-12-22 PROCEDURE — 99024 POSTOP FOLLOW-UP VISIT: CPT | Mod: S$GLB,,, | Performed by: PODIATRIST

## 2021-12-22 PROCEDURE — 4010F PR ACE/ARB THEARPY RXD/TAKEN: ICD-10-PCS | Mod: CPTII,S$GLB,, | Performed by: PODIATRIST

## 2021-12-22 PROCEDURE — 3008F PR BODY MASS INDEX (BMI) DOCUMENTED: ICD-10-PCS | Mod: CPTII,S$GLB,, | Performed by: PODIATRIST

## 2021-12-22 PROCEDURE — 3288F PR FALLS RISK ASSESSMENT DOCUMENTED: ICD-10-PCS | Mod: CPTII,S$GLB,, | Performed by: PODIATRIST

## 2021-12-22 PROCEDURE — 1159F MED LIST DOCD IN RCRD: CPT | Mod: CPTII,S$GLB,, | Performed by: PODIATRIST

## 2021-12-22 PROCEDURE — 3008F BODY MASS INDEX DOCD: CPT | Mod: CPTII,S$GLB,, | Performed by: PODIATRIST

## 2021-12-22 PROCEDURE — 3288F FALL RISK ASSESSMENT DOCD: CPT | Mod: CPTII,S$GLB,, | Performed by: PODIATRIST

## 2021-12-22 PROCEDURE — 99024 PR POST-OP FOLLOW-UP VISIT: ICD-10-PCS | Mod: S$GLB,,, | Performed by: PODIATRIST

## 2021-12-22 PROCEDURE — 1159F PR MEDICATION LIST DOCUMENTED IN MEDICAL RECORD: ICD-10-PCS | Mod: CPTII,S$GLB,, | Performed by: PODIATRIST

## 2021-12-22 PROCEDURE — 99999 PR PBB SHADOW E&M-EST. PATIENT-LVL III: CPT | Mod: PBBFAC,,, | Performed by: PODIATRIST

## 2022-01-05 ENCOUNTER — OFFICE VISIT (OUTPATIENT)
Dept: PODIATRY | Facility: CLINIC | Age: 72
End: 2022-01-05
Payer: MEDICARE

## 2022-01-05 VITALS — BODY MASS INDEX: 39.65 KG/M2 | WEIGHT: 201.94 LBS | HEIGHT: 60 IN

## 2022-01-05 DIAGNOSIS — Z98.890 S/P NAIL SURGERY: Primary | ICD-10-CM

## 2022-01-05 PROCEDURE — 3288F PR FALLS RISK ASSESSMENT DOCUMENTED: ICD-10-PCS | Mod: CPTII,S$GLB,, | Performed by: PODIATRIST

## 2022-01-05 PROCEDURE — 3008F PR BODY MASS INDEX (BMI) DOCUMENTED: ICD-10-PCS | Mod: CPTII,S$GLB,, | Performed by: PODIATRIST

## 2022-01-05 PROCEDURE — 99999 PR PBB SHADOW E&M-EST. PATIENT-LVL III: CPT | Mod: PBBFAC,,, | Performed by: PODIATRIST

## 2022-01-05 PROCEDURE — 99212 PR OFFICE/OUTPT VISIT, EST, LEVL II, 10-19 MIN: ICD-10-PCS | Mod: S$GLB,,, | Performed by: PODIATRIST

## 2022-01-05 PROCEDURE — 1126F PR PAIN SEVERITY QUANTIFIED, NO PAIN PRESENT: ICD-10-PCS | Mod: CPTII,S$GLB,, | Performed by: PODIATRIST

## 2022-01-05 PROCEDURE — 99212 OFFICE O/P EST SF 10 MIN: CPT | Mod: S$GLB,,, | Performed by: PODIATRIST

## 2022-01-05 PROCEDURE — 1159F MED LIST DOCD IN RCRD: CPT | Mod: CPTII,S$GLB,, | Performed by: PODIATRIST

## 2022-01-05 PROCEDURE — 3288F FALL RISK ASSESSMENT DOCD: CPT | Mod: CPTII,S$GLB,, | Performed by: PODIATRIST

## 2022-01-05 PROCEDURE — 1126F AMNT PAIN NOTED NONE PRSNT: CPT | Mod: CPTII,S$GLB,, | Performed by: PODIATRIST

## 2022-01-05 PROCEDURE — 1101F PT FALLS ASSESS-DOCD LE1/YR: CPT | Mod: CPTII,S$GLB,, | Performed by: PODIATRIST

## 2022-01-05 PROCEDURE — 1101F PR PT FALLS ASSESS DOC 0-1 FALLS W/OUT INJ PAST YR: ICD-10-PCS | Mod: CPTII,S$GLB,, | Performed by: PODIATRIST

## 2022-01-05 PROCEDURE — 99999 PR PBB SHADOW E&M-EST. PATIENT-LVL III: ICD-10-PCS | Mod: PBBFAC,,, | Performed by: PODIATRIST

## 2022-01-05 PROCEDURE — 1159F PR MEDICATION LIST DOCUMENTED IN MEDICAL RECORD: ICD-10-PCS | Mod: CPTII,S$GLB,, | Performed by: PODIATRIST

## 2022-01-05 PROCEDURE — 3008F BODY MASS INDEX DOCD: CPT | Mod: CPTII,S$GLB,, | Performed by: PODIATRIST

## 2022-01-05 NOTE — PROGRESS NOTES
SUBJECTIVE: Patient returns to the clinic one week status post nail procedure.  Patient has no complaints of fever chills or sweats.  Minimal pain.  Patient has been dressing as instructed.    Physical examination: General: Pt. is in no acute distress, alert and oriented x 3.    Podiatric examination: Vascular:Capillary refill time is within normal limits.  Dermatologic: Surgical site is clean without any signs of infection. minimal drainage.  No significant erythema.    IMPRESSION: 4 weeks postop nail procedure with satisfactory progress no signs of infection.    PLAN: Patient to continue local care until completely healed with no drainage and no redness.  Patient should call the clinic immediately if any signs of infection such as fever chills sweats increased redness or pain but was otherwise discharged.

## 2025-02-21 ENCOUNTER — HOSPITAL ENCOUNTER (EMERGENCY)
Facility: HOSPITAL | Age: 75
Discharge: HOME | End: 2025-02-22
Attending: INTERNAL MEDICINE
Payer: MEDICARE

## 2025-02-21 DIAGNOSIS — J44.1 COPD EXACERBATION: Primary | ICD-10-CM

## 2025-02-21 DIAGNOSIS — R05.9 COUGH: ICD-10-CM

## 2025-02-21 PROCEDURE — 99284 EMERGENCY DEPT VISIT MOD MDM: CPT | Mod: 25,ER

## 2025-02-21 PROCEDURE — 94640 AIRWAY INHALATION TREATMENT: CPT | Mod: ER

## 2025-02-21 RX ORDER — PREDNISONE 20 MG/1
60 TABLET ORAL
Status: COMPLETED | OUTPATIENT
Start: 2025-02-22 | End: 2025-02-22

## 2025-02-21 RX ORDER — IPRATROPIUM BROMIDE AND ALBUTEROL SULFATE 2.5; .5 MG/3ML; MG/3ML
9 SOLUTION RESPIRATORY (INHALATION) CONTINUOUS
Status: DISCONTINUED | OUTPATIENT
Start: 2025-02-22 | End: 2025-02-22 | Stop reason: HOSPADM

## 2025-02-21 RX ORDER — DOXYCYCLINE HYCLATE 100 MG
100 TABLET ORAL
Status: COMPLETED | OUTPATIENT
Start: 2025-02-22 | End: 2025-02-22

## 2025-02-21 RX ADMIN — IPRATROPIUM BROMIDE AND ALBUTEROL SULFATE 9 ML: .5; 3 SOLUTION RESPIRATORY (INHALATION) at 11:02

## 2025-02-22 VITALS
HEIGHT: 59 IN | RESPIRATION RATE: 20 BRPM | TEMPERATURE: 98 F | HEART RATE: 87 BPM | BODY MASS INDEX: 38.1 KG/M2 | WEIGHT: 189 LBS | OXYGEN SATURATION: 92 % | SYSTOLIC BLOOD PRESSURE: 142 MMHG | DIASTOLIC BLOOD PRESSURE: 78 MMHG

## 2025-02-22 LAB
ALBUMIN SERPL-MCNC: 3.6 G/DL (ref 3.3–5.5)
ALP SERPL-CCNC: 140 U/L (ref 42–141)
BILIRUB SERPL-MCNC: 0.5 MG/DL (ref 0.2–1.6)
BUN SERPL-MCNC: 14 MG/DL (ref 7–22)
CALCIUM SERPL-MCNC: 9.2 MG/DL (ref 8–10.3)
CHLORIDE SERPL-SCNC: 114 MMOL/L (ref 98–108)
CREAT SERPL-MCNC: 1.6 MG/DL (ref 0.6–1.2)
GLUCOSE SERPL-MCNC: 91 MG/DL (ref 73–118)
HCT, POC: NORMAL
HGB, POC: NORMAL (ref 14–18)
MCH, POC: NORMAL
MCHC, POC: NORMAL
MCV, POC: NORMAL
MPV, POC: NORMAL
POC ALT (SGPT): 14 U/L (ref 10–47)
POC AST (SGOT): 21 U/L (ref 11–38)
POC PLATELET COUNT: NORMAL
POC TCO2: 31 MMOL/L (ref 18–33)
POTASSIUM BLD-SCNC: 5.2 MMOL/L (ref 3.6–5.1)
PROTEIN, POC: 7.6 G/DL (ref 6.4–8.1)
RBC, POC: NORMAL
RDW, POC: NORMAL
SODIUM BLD-SCNC: 148 MMOL/L (ref 128–145)
WBC, POC: NORMAL

## 2025-02-22 PROCEDURE — 85025 COMPLETE CBC W/AUTO DIFF WBC: CPT | Mod: ER

## 2025-02-22 PROCEDURE — 87040 BLOOD CULTURE FOR BACTERIA: CPT | Mod: 59 | Performed by: INTERNAL MEDICINE

## 2025-02-22 PROCEDURE — 25000003 PHARM REV CODE 250: Mod: ER | Performed by: INTERNAL MEDICINE

## 2025-02-22 PROCEDURE — 63600175 PHARM REV CODE 636 W HCPCS: Mod: ER | Performed by: INTERNAL MEDICINE

## 2025-02-22 PROCEDURE — 80053 COMPREHEN METABOLIC PANEL: CPT | Mod: ER

## 2025-02-22 PROCEDURE — 25000242 PHARM REV CODE 250 ALT 637 W/ HCPCS: Mod: ER | Performed by: INTERNAL MEDICINE

## 2025-02-22 RX ORDER — DOXYCYCLINE 100 MG/1
100 CAPSULE ORAL 2 TIMES DAILY
Qty: 20 CAPSULE | Refills: 0 | Status: SHIPPED | OUTPATIENT
Start: 2025-02-22 | End: 2025-03-04

## 2025-02-22 RX ORDER — PREDNISONE 20 MG/1
40 TABLET ORAL DAILY
Qty: 10 TABLET | Refills: 0 | Status: SHIPPED | OUTPATIENT
Start: 2025-02-22 | End: 2025-02-27

## 2025-02-22 RX ORDER — ALBUTEROL SULFATE 90 UG/1
2 INHALANT RESPIRATORY (INHALATION) EVERY 6 HOURS PRN
Qty: 18 G | Refills: 0 | Status: SHIPPED | OUTPATIENT
Start: 2025-02-22

## 2025-02-22 RX ADMIN — DOXYCYCLINE HYCLATE 100 MG: 100 TABLET, COATED ORAL at 12:02

## 2025-02-22 RX ADMIN — PREDNISONE 60 MG: 20 TABLET ORAL at 12:02

## 2025-02-22 NOTE — ED PROVIDER NOTES
Encounter Date: 2/21/2025       History     Chief Complaint   Patient presents with    Cough     Pt states she was diagnosed with the flu a month ago and has had a persistent cough ever since     74-year-old female with a past medical history significant for asthma, bronchiectasis, chronic pain, coronary artery disease, hypertension and diabetes mellitus type 2 presents to emergency department complaining of persistent cough since being diagnosed with the flu a month ago .  She states she has been an everyday cigarette smoker up until approximately 1 week ago.  Course of ED stay:   Patient received DuoNeb treatment, prednisone and doxycycline in the emergency department.  She states she feels much better after medications.  O2 sats have been greater than 88% on room air throughout ED stay.  Wheezing has decreased markedly and patient was counseled on the need to discontinue cigarette smoking.  CBC and CMP are reassuring.  Chest x-ray showed no evidence of obvious pneumonia, but did reveal left retrocardiac haziness, which could be atelectasis or consolidation.  Prescriptions for doxycycline/albuterol/prednisone burst were given and patient was advised to follow-up with her primary care physician within the next week for re-evaluation/return to the emergency department if condition worsens.    The history is provided by the patient. No  was used.     Review of patient's allergies indicates:  No Known Allergies  Past Medical History:   Diagnosis Date    Anxiety     Arthritis     Asthma     Bronchiectasis with acute exacerbation     Bronchitis     Chronic pain     right knee    Coronary artery disease involving native coronary artery of native heart without angina pectoris 1/10/2018    Essential hypertension, benign 11/21/2012    Hypotension, iatrogenic     Insomnia     Knee pain, right     chronic    Mitral valve prolapse     Obstructive chronic bronchitis without exacerbation 11/21/2012    Type II  or unspecified type diabetes mellitus without mention of complication, uncontrolled     Type II or unspecified type diabetes mellitus without mention of complication, uncontrolled      Past Surgical History:   Procedure Laterality Date    bowel reconstruction      BREAST SURGERY      CHOLECYSTECTOMY      HYSTERECTOMY      KNEE SURGERY      R, surgeries x 7    Right Leg reconstruction surgery      Rotator Cuff Surgery      TONSILLECTOMY      WRIST SURGERY       Family History   Problem Relation Name Age of Onset    Heart disease Sister      Heart disease Brother      Hypertension Mother       Social History[1]  Review of Systems    Physical Exam     Initial Vitals [02/21/25 2254]   BP Pulse Resp Temp SpO2   (!) 174/111 107 20 99.1 °F (37.3 °C) 95 %      MAP       --         Physical Exam    ED Course   Procedures  Labs Reviewed   POCT CMP - Abnormal       Result Value    Albumin, POC 3.6      Alkaline Phosphatase,       ALT (SGPT), POC 14      AST (SGOT), POC 21      POC BUN 14      Calcium, POC 9.2      POC Chloride 114 (*)     POC Creatinine 1.6 (*)     POC Glucose 91      POC Potassium 5.2 (*)     POC Sodium 148 (*)     Bilirubin, POC 0.5      POC TCO2 31      Protein, POC 7.6     CULTURE, BLOOD   CULTURE, BLOOD   POCT CBC    Hematocrit        Hemoglobin        RBC        WBC        MCV        MCH, POC        MCHC        RDW-CV        Platelet Count, POC        MPV       POCT CMP          Imaging Results              X-Ray Chest AP Portable (Final result)  Result time 02/22/25 00:16:40      Final result by Garry Ballesteros MD (02/22/25 00:16:40)                   Impression:      As above.      Electronically signed by: Garry Ballesteros MD  Date:    02/22/2025  Time:    00:16               Narrative:    EXAMINATION:  XR CHEST AP PORTABLE    CLINICAL HISTORY:  Cough, unspecified    TECHNIQUE:  Single frontal view of the chest was performed.    COMPARISON:  September 2019.    FINDINGS:  Cardiac silhouette is  enlarged but stable in size.  Lungs are expanded.  Left lung base and costophrenic angle are partially obscured.  Difficult to exclude potential retrocardiac left basilar consolidation/atelectasis or small pleural effusion.  Right lung is clear and left mid to upper lung zone are otherwise relatively clear.  No significant right-sided pleural effusion.  No pneumothorax.  No acute osseous abnormality identified.                                       Medications   albuterol-ipratropium 2.5 mg-0.5 mg/3 mL nebulizer solution 9 mL (9 mLs Nebulization New Bag 2/21/25 3533)   predniSONE tablet 60 mg (60 mg Oral Given 2/22/25 0044)   doxycycline tablet 100 mg (100 mg Oral Given 2/22/25 0044)     Medical Decision Making  Amount and/or Complexity of Data Reviewed  Labs: ordered.  Radiology: ordered.    Risk  Prescription drug management.                                      Clinical Impression:  Final diagnoses:  [R05.9] Cough  [J44.1] COPD exacerbation (Primary)          ED Disposition Condition    Discharge Stable          ED Prescriptions       Medication Sig Dispense Start Date End Date Auth. Provider    predniSONE (DELTASONE) 20 MG tablet Take 2 tablets (40 mg total) by mouth once daily. for 5 days 10 tablet 2/22/2025 2/27/2025 Wili Lucas MD    doxycycline (VIBRAMYCIN) 100 MG Cap Take 1 capsule (100 mg total) by mouth 2 (two) times daily. for 10 days 20 capsule 2/22/2025 3/4/2025 Wili Lucas MD    albuterol (VENTOLIN HFA) 90 mcg/actuation inhaler Inhale 2 puffs into the lungs every 6 (six) hours as needed for Wheezing. Rescue 18 g 2/22/2025 -- Wili Lucas MD          Follow-up Information       Follow up With Specialties Details Why Contact Info    Guerrero Dc Jr., MD Hospitalist, Family Medicine Schedule an appointment as soon as possible for a visit in 1 week For reevaluation [id number] [facility] [place] [id number] [phone]                   [1]   Social  History  Tobacco Use    Smoking status: Some Days     Current packs/day: 0.25     Average packs/day: 0.3 packs/day for 45.0 years (11.3 ttl pk-yrs)     Types: Cigarettes    Smokeless tobacco: Never   Substance Use Topics    Alcohol use: No    Drug use: Yes     Types: Marijuana        Wili Lucas MD  02/22/25 0511

## 2025-02-26 LAB
BACTERIA BLD CULT: NORMAL
BACTERIA BLD CULT: NORMAL

## 2025-07-26 ENCOUNTER — HOSPITAL ENCOUNTER (EMERGENCY)
Facility: HOSPITAL | Age: 75
Discharge: HOME | End: 2025-07-26
Attending: STUDENT IN AN ORGANIZED HEALTH CARE EDUCATION/TRAINING PROGRAM
Payer: MEDICARE

## 2025-07-26 VITALS
TEMPERATURE: 98 F | OXYGEN SATURATION: 96 % | DIASTOLIC BLOOD PRESSURE: 71 MMHG | RESPIRATION RATE: 18 BRPM | HEART RATE: 75 BPM | BODY MASS INDEX: 34.27 KG/M2 | SYSTOLIC BLOOD PRESSURE: 138 MMHG | HEIGHT: 59 IN | WEIGHT: 170 LBS

## 2025-07-26 DIAGNOSIS — R20.0 NUMBNESS: Primary | ICD-10-CM

## 2025-07-26 DIAGNOSIS — N17.9 AKI (ACUTE KIDNEY INJURY): ICD-10-CM

## 2025-07-26 DIAGNOSIS — R42 DIZZINESS: ICD-10-CM

## 2025-07-26 LAB
ALBUMIN SERPL-MCNC: 3.6 G/DL (ref 3.3–5.5)
ALBUMIN SERPL-MCNC: 3.8 G/DL (ref 3.3–5.5)
ALP SERPL-CCNC: 222 U/L (ref 42–141)
ALP SERPL-CCNC: 236 U/L (ref 42–141)
BILIRUB SERPL-MCNC: 0.4 MG/DL (ref 0.2–1.6)
BILIRUB SERPL-MCNC: 0.4 MG/DL (ref 0.2–1.6)
BILIRUBIN, POC UA: NEGATIVE
BLOOD, POC UA: NEGATIVE
BUN SERPL-MCNC: 23 MG/DL (ref 7–22)
BUN SERPL-MCNC: 24 MG/DL (ref 7–22)
CALCIUM SERPL-MCNC: 10.1 MG/DL (ref 8–10.3)
CALCIUM SERPL-MCNC: 9.7 MG/DL (ref 8–10.3)
CHLORIDE SERPL-SCNC: 107 MMOL/L (ref 98–108)
CHLORIDE SERPL-SCNC: 109 MMOL/L (ref 98–108)
CLARITY, UA: CLEAR
COLOR, UA: YELLOW
CREAT SERPL-MCNC: 2.1 MG/DL (ref 0.6–1.2)
CREAT SERPL-MCNC: 2.2 MG/DL (ref 0.6–1.2)
GLUCOSE SERPL-MCNC: 121 MG/DL (ref 73–118)
GLUCOSE SERPL-MCNC: 139 MG/DL (ref 73–118)
GLUCOSE, POC UA: NEGATIVE
HCT, POC: NORMAL
HGB, POC: NORMAL (ref 14–18)
KETONES, POC UA: NEGATIVE
LEUKOCYTE EST, POC UA: NEGATIVE
MAGNESIUM SERPL-MCNC: 1.8 MG/DL (ref 1.6–2.6)
MCH, POC: NORMAL
MCHC, POC: NORMAL
MCV, POC: NORMAL
MPV, POC: NORMAL
NITRITE, POC UA: NEGATIVE
OHS QRS DURATION: 96 MS
OHS QTC CALCULATION: 481 MS
PH UR STRIP: 5.5 [PH] (ref 5–8)
POC ALT (SGPT): 48 U/L (ref 10–47)
POC ALT (SGPT): 52 U/L (ref 10–47)
POC AST (SGOT): 89 U/L (ref 11–38)
POC AST (SGOT): 97 U/L (ref 11–38)
POC PLATELET COUNT: NORMAL
POC PTINR: 1.3 (ref 0.9–1.2)
POC PTWBT: 13.2 SEC (ref 9.7–14.3)
POC TCO2: 27 MMOL/L (ref 18–33)
POC TCO2: 27 MMOL/L (ref 18–33)
POTASSIUM BLD-SCNC: 5 MMOL/L (ref 3.6–5.1)
POTASSIUM BLD-SCNC: 5.2 MMOL/L (ref 3.6–5.1)
PROTEIN, POC UA: NEGATIVE
PROTEIN, POC: 6.7 G/DL (ref 6.4–8.1)
PROTEIN, POC: 7.2 G/DL (ref 6.4–8.1)
RBC, POC: NORMAL
RDW, POC: NORMAL
SAMPLE: ABNORMAL
SODIUM BLD-SCNC: 140 MMOL/L (ref 128–145)
SODIUM BLD-SCNC: 146 MMOL/L (ref 128–145)
SPECIFIC GRAVITY, POC UA: 1.01 (ref 1–1.03)
UROBILINOGEN, POC UA: 0.2 E.U./DL
WBC, POC: NORMAL

## 2025-07-26 PROCEDURE — 93010 ELECTROCARDIOGRAM REPORT: CPT | Mod: ,,, | Performed by: INTERNAL MEDICINE

## 2025-07-26 PROCEDURE — 96365 THER/PROPH/DIAG IV INF INIT: CPT | Mod: ER

## 2025-07-26 PROCEDURE — 63600175 PHARM REV CODE 636 W HCPCS: Mod: ER | Performed by: STUDENT IN AN ORGANIZED HEALTH CARE EDUCATION/TRAINING PROGRAM

## 2025-07-26 PROCEDURE — 96366 THER/PROPH/DIAG IV INF ADDON: CPT | Mod: ER

## 2025-07-26 PROCEDURE — 80053 COMPREHEN METABOLIC PANEL: CPT | Mod: ER

## 2025-07-26 PROCEDURE — 85025 COMPLETE CBC W/AUTO DIFF WBC: CPT | Mod: ER

## 2025-07-26 PROCEDURE — 83735 ASSAY OF MAGNESIUM: CPT | Performed by: STUDENT IN AN ORGANIZED HEALTH CARE EDUCATION/TRAINING PROGRAM

## 2025-07-26 PROCEDURE — 25000003 PHARM REV CODE 250: Mod: ER | Performed by: STUDENT IN AN ORGANIZED HEALTH CARE EDUCATION/TRAINING PROGRAM

## 2025-07-26 PROCEDURE — 99285 EMERGENCY DEPT VISIT HI MDM: CPT | Mod: 25,ER

## 2025-07-26 PROCEDURE — 93005 ELECTROCARDIOGRAM TRACING: CPT | Mod: ER

## 2025-07-26 RX ORDER — MECLIZINE HYDROCHLORIDE 25 MG/1
50 TABLET ORAL
Status: COMPLETED | OUTPATIENT
Start: 2025-07-26 | End: 2025-07-26

## 2025-07-26 RX ORDER — MECLIZINE HYDROCHLORIDE 25 MG/1
25 TABLET ORAL 3 TIMES DAILY PRN
Qty: 20 TABLET | Refills: 0 | Status: SHIPPED | OUTPATIENT
Start: 2025-07-26

## 2025-07-26 RX ORDER — MAGNESIUM SULFATE HEPTAHYDRATE 40 MG/ML
2 INJECTION, SOLUTION INTRAVENOUS ONCE
Status: COMPLETED | OUTPATIENT
Start: 2025-07-26 | End: 2025-07-26

## 2025-07-26 RX ADMIN — MAGNESIUM SULFATE HEPTAHYDRATE 2 G: 40 INJECTION, SOLUTION INTRAVENOUS at 01:07

## 2025-07-26 RX ADMIN — MECLIZINE HYDROCHLORIDE 50 MG: 25 TABLET ORAL at 01:07

## 2025-07-26 RX ADMIN — SODIUM CHLORIDE, POTASSIUM CHLORIDE, SODIUM LACTATE AND CALCIUM CHLORIDE 500 ML: 600; 310; 30; 20 INJECTION, SOLUTION INTRAVENOUS at 01:07

## 2025-07-26 NOTE — DISCHARGE INSTRUCTIONS
Take meclizine as needed for reoccurrence of your dizziness.  Follow up with the Neurology for further outpatient evaluation of the numbness in your head.  I would hold off on your diuretics at this time and follow up with your regular physician for repeat lab work this coming Friday as planned.  Return for worsening of her dizziness, numbness or weakness in 1 part of her body, facial asymmetry, speech difficulty or other concerning symptoms.  Thank you.

## 2025-07-26 NOTE — ED NOTES
Attempted to get EKG multiple times however v4 lead would not work on EKG machine. Placed pt on cardiac monitor and obtain EKG from monitor

## 2025-07-26 NOTE — ED PROVIDER NOTES
Encounter Date: 7/26/2025       History     Chief Complaint   Patient presents with    Headache     PT STATES HER RIGHT SIDE OF HEAD IS NUMB AND IS ALWAYS DIZZY X 3 WEEKS     HPI    74-year-old female with a history of hypertension, hyperlipidemia, diabetes, coronary artery disease, CHF presents to the emergency department for 3 weeks of constant dizziness feeling like the room is spinning.  She describes it is not feeling faint.  She notes some associated numbness to the right side of her scalp as well as itchiness in his area and some discomfort only when lying on the right side of her head.  She denies a headache, numbness or weakness in 1 part of her body, speech difficulty, facial asymmetry, seizure activity or significant changes in her vision.  She notes she has never had dizziness like this before.  She notes it does improve when she lies flat but gets worse when she sits up.  She is on Mounjaro.  No history of a stroke.  She denies any ringing in her ears, changes in her hearing or recent illnesses.    Review of patient's allergies indicates:  No Known Allergies  Past Medical History:   Diagnosis Date    Anxiety     Arthritis     Asthma     Bronchiectasis with acute exacerbation     Bronchitis     Chronic pain     right knee    Coronary artery disease involving native coronary artery of native heart without angina pectoris 1/10/2018    Essential hypertension, benign 11/21/2012    Hypotension, iatrogenic     Insomnia     Knee pain, right     chronic    Mitral valve prolapse     Obstructive chronic bronchitis without exacerbation 11/21/2012    Type II or unspecified type diabetes mellitus without mention of complication, uncontrolled     Type II or unspecified type diabetes mellitus without mention of complication, uncontrolled      Past Surgical History:   Procedure Laterality Date    bowel reconstruction      BREAST SURGERY      CHOLECYSTECTOMY      HYSTERECTOMY      KNEE SURGERY      R, surgeries x 7     Right Leg reconstruction surgery      Rotator Cuff Surgery      TONSILLECTOMY      WRIST SURGERY       Family History   Problem Relation Name Age of Onset    Heart disease Sister      Heart disease Brother      Hypertension Mother       Social History[1]  Review of Systems   Constitutional:  Negative for fever.   HENT:  Negative for sore throat.    Respiratory:  Negative for shortness of breath.    Cardiovascular:  Negative for chest pain.   Gastrointestinal:  Negative for abdominal pain, nausea and vomiting.   Genitourinary:  Negative for dysuria.   Musculoskeletal:  Negative for back pain.   Skin:  Negative for rash.   Neurological:  Positive for numbness (right side of her head). Negative for facial asymmetry, speech difficulty and weakness.   Hematological:  Does not bruise/bleed easily.       Physical Exam     Initial Vitals [07/26/25 0049]   BP Pulse Resp Temp SpO2   136/80 90 20 98.1 °F (36.7 °C) 95 %      MAP       --         Physical Exam    Constitutional: Vital signs are normal. She appears well-developed and well-nourished.  Non-toxic appearance. She does not have a sickly appearance. She does not appear ill.   HENT:   Head: Normocephalic and atraumatic. Mouth/Throat: Mucous membranes are normal.   Eyes: EOM are normal.   Extraocular movements are intact, pupils are equal round reactive to light, no nystagmus   Neck: Neck supple.   Cardiovascular:  Normal rate and regular rhythm.           Pulmonary/Chest: No respiratory distress.   Abdominal: Abdomen is soft. Bowel sounds are normal. There is no abdominal tenderness. There is no rebound and no guarding.   Musculoskeletal:      Cervical back: Neck supple.     Neurological: She is alert. She has normal strength. No cranial nerve deficit or sensory deficit. Coordination and gait normal.   She does have some dullness to sensation in the right parietal scalp relative to the left  She appears to have a normal gait, when she stood up she seemed to mildly  unsteady but then was able to maintain her balance without difficulty and ambulate about the room   Skin: Skin is warm and dry. No rash noted.   Psychiatric: She has a normal mood and affect.         ED Course   Procedures  Labs Reviewed   ISTAT PROCEDURE - Abnormal       Result Value    POC PTWBT 13.2      POC PTINR 1.3 (*)     Sample unknown     POCT CMP - Abnormal    Albumin, POC 3.8      Alkaline Phosphatase,  (*)     ALT (SGPT), POC 48 (*)     AST (SGOT), POC 97 (*)     POC BUN 24 (*)     Calcium, POC 10.1      POC Chloride 109 (*)     POC Creatinine 2.2 (*)     POC Glucose 139 (*)     POC Potassium 5.2 (*)     POC Sodium 146 (*)     Bilirubin, POC 0.4      POC TCO2 27      Protein, POC 7.2     POCT CMP - Abnormal    Albumin, POC 3.6      Alkaline Phosphatase,  (*)     ALT (SGPT), POC 52 (*)     AST (SGOT), POC 89 (*)     POC BUN 23 (*)     Calcium, POC 9.7      POC Chloride 107      POC Creatinine 2.1 (*)     POC Glucose 121 (*)     POC Potassium 5.0      POC Sodium 140      Bilirubin, POC 0.4      POC TCO2 27      Protein, POC 6.7     MAGNESIUM - Normal    Magnesium  1.8     POCT CBC    Hematocrit        Hemoglobin        RBC        WBC        MCV        MCH, POC        MCHC        RDW-CV        Platelet Count, POC        MPV       POCT CMP   POCT PROTIME-INR   POCT URINALYSIS W/O SCOPE   POCT CMP   POCT URINALYSIS W/O SCOPE    Glucose, UA Negative      Bilirubin, UA Negative      Ketones, UA Negative      Spec Grav UA 1.015      Blood, UA Negative      PH, UA 5.5      Protein, UA Negative      Urobilinogen, UA 0.2      Nitrite, UA Negative      Leukocytes, UA Negative      Color, UA POC Yellow      Clarity, UA, POC Clear            Imaging Results              CT Head Without Contrast (Final result)  Result time 07/26/25 02:39:49      Final result by Gopi Robertson MD (07/26/25 02:39:49)                   Impression:    IMPRESSION:  1.  No acute intracranial abnormality.  2.  Minimal chronic  microvascular ischemic changes.    -Electronically Signed By: Gopi Robertson MD   -Electronically Signed On:  7/26/2025 2:39 AM      Report Ends               Narrative:    EXAM: CT HEAD WITHOUT CONTRAST    HISTORY: Dizziness, persistent/recurrent, cardiac or vascular cause suspected    TECHNIQUE: Noncontrast CT the brain was performed in the axial plane from the foramen magnum to the vertex.  All CT scans are performed using dose optimization techniques as appropriate to a performed exam including automated exposure control and/or standardized protocols for targeted exams where dose is matched to indication/reason for exam/patient size.    COMPARISON: None.    FINDINGS:     Brain is normal in volume for age.    Gray-white differentiation is maintained with no CT evidence of acute infarct.     No intracranial hemorrhage, extra-axial fluid collection, vasogenic edema, mass effect or midline shift.     Subtle hypodensity in the periventricular white matter compatible with minimal chronic microvascular ischemic changes. No significant calcified plaque in the intracranial vasculature.    No hydrocephalus.     The visualized paranasal sinuses and mastoid air cells are clear. Bones are unremarkable. Bilateral lens replacement, orbits otherwise are unremarkable.                                       Medications   lactated ringers bolus 500 mL (0 mLs Intravenous Stopped 7/26/25 0241)   meclizine tablet 50 mg (50 mg Oral Given 7/26/25 0138)   magnesium sulfate 2g in water 50mL IVPB (premix) (0 g Intravenous Stopped 7/26/25 0400)     Medical Decision Making  Amount and/or Complexity of Data Reviewed  Labs: ordered.  Radiology: ordered.    Risk  Prescription drug management.    Vitals are unremarkable   She is well-appearing and in no acute distress  She is watching TV without complaint however she is endorsing significant dizziness  Differential includes but not limited to TIA, stroke, intracranial mass, vestibular migraine,  occipital neuralgia, cervical myelopathy, peripheral vertigo, dehydration, electrolyte abnormality, cardiac cause of her dizziness  NIH 0   EKG shows sinus rhythm with a rate of 85, NC interval 178, QRS D of 100, ; no ST or T-wave abnormality concerning for ischemia  We will obtain CT head, CTA as patient has had this persistent dizziness which is concerning for potential stroke; she has significant risk factors for stroke as well   Creatinine 2.2, baseline 1.6, sodium of 146; sodium has been high in the past as well  On additional history patient notes she did take a dose of a diuretic 3 days ago due to some bilateral feet swelling; notes she did urinate excessively afterwards; she is also on Mounjaro she does not eaten much typically  We will give 500 of fluid; IVC collapses somewhat on inspiration, do think she can handle some fluid  We will also treat with 2 g of magnesium in case this is a complex migraine  On reassessment dizziness resolved entirely 30 minutes following a dose of meclizine  Repeat chemistry following fluids shows a creatinine of 2.1, sodium 140; do not feel she warrants admission for this mild SHAINA  Mild elevations in the alk-phos, AST/ALT significantly changed; this is a nonspecific  She continues to feel well in his requesting to be discharged   I referred her to Neurology for further evaluation of this right scalp numbness  Advised to hold her diuretic, she has follow up with the regular physician in 6 days; advised her to keep this follow up for repeat lab work to re-evaluate her renal function  Discharged with meclizine for dizziness  Given return precautions for speech difficulty, facial asymmetry, focal numbness or weakness or other concerning symptoms   Patient verbalized understanding agreement with the plan   Patient is stable for discharge    I discussed with the patient/family the diagnosis, treatment plan, indications for return to the emergency department, and for expected  follow-up. The patient/family verbalized an understanding. The patient/family is asked if there are any questions or concerns. We discuss the case, until all issues are addressed to the patient/familys satisfaction. Patient/family understands and is agreeable to the plan.   Daniel Harris    DISCLAIMER: This note was prepared with [employer] voice recognition transcription software.                                             Clinical Impression:  Final diagnoses:  [R42] Dizziness  [R20.0] Numbness (Primary)  [N17.9] SHAINA (acute kidney injury)          ED Disposition Condition    Discharge Stable          ED Prescriptions       Medication Sig Dispense Start Date End Date Auth. Provider    meclizine (ANTIVERT) 25 mg tablet Take 1 tablet (25 mg total) by mouth 3 (three) times daily as needed for Dizziness. 20 tablet 7/26/2025 -- Daniel Harris MD          Follow-up Information    None                [1]   Social History  Tobacco Use    Smoking status: Some Days     Current packs/day: 0.25     Average packs/day: 0.3 packs/day for 45.0 years (11.3 ttl pk-yrs)     Types: Cigarettes    Smokeless tobacco: Never   Substance Use Topics    Alcohol use: No    Drug use: Yes     Types: Marijuana        Daniel Harris MD  07/26/25 0439

## 2025-07-27 LAB
OHS QRS DURATION: 100 MS
OHS QRS DURATION: 72 MS
OHS QTC CALCULATION: 422 MS
OHS QTC CALCULATION: 471 MS